# Patient Record
Sex: FEMALE | Race: WHITE | NOT HISPANIC OR LATINO | Employment: FULL TIME | URBAN - METROPOLITAN AREA
[De-identification: names, ages, dates, MRNs, and addresses within clinical notes are randomized per-mention and may not be internally consistent; named-entity substitution may affect disease eponyms.]

---

## 2017-01-23 DIAGNOSIS — R73.9 HYPERGLYCEMIA: Primary | ICD-10-CM

## 2017-01-28 ENCOUNTER — RESULTS ENCOUNTER (OUTPATIENT)
Dept: FAMILY MEDICINE CLINIC | Facility: CLINIC | Age: 47
End: 2017-01-28

## 2017-01-28 DIAGNOSIS — R73.9 HYPERGLYCEMIA: ICD-10-CM

## 2017-01-29 LAB
BUN SERPL-MCNC: 17 MG/DL (ref 6–24)
BUN/CREAT SERPL: 22 (ref 9–23)
CALCIUM SERPL-MCNC: 9.7 MG/DL (ref 8.7–10.2)
CHLORIDE SERPL-SCNC: 103 MMOL/L (ref 96–106)
CO2 SERPL-SCNC: 21 MMOL/L (ref 18–29)
CREAT SERPL-MCNC: 0.76 MG/DL (ref 0.57–1)
GLUCOSE SERPL-MCNC: 114 MG/DL (ref 65–99)
POTASSIUM SERPL-SCNC: 4.7 MMOL/L (ref 3.5–5.2)
SODIUM SERPL-SCNC: 143 MMOL/L (ref 134–144)

## 2017-02-06 ENCOUNTER — OFFICE VISIT (OUTPATIENT)
Dept: FAMILY MEDICINE CLINIC | Facility: CLINIC | Age: 47
End: 2017-02-06

## 2017-02-06 VITALS
SYSTOLIC BLOOD PRESSURE: 112 MMHG | HEART RATE: 74 BPM | HEIGHT: 63 IN | TEMPERATURE: 98.3 F | DIASTOLIC BLOOD PRESSURE: 78 MMHG | OXYGEN SATURATION: 98 % | BODY MASS INDEX: 28.08 KG/M2 | WEIGHT: 158.5 LBS

## 2017-02-06 DIAGNOSIS — J30.9 ATOPIC RHINITIS: ICD-10-CM

## 2017-02-06 DIAGNOSIS — E03.9 HYPOTHYROIDISM, UNSPECIFIED TYPE: ICD-10-CM

## 2017-02-06 DIAGNOSIS — F41.8 MIXED ANXIETY DEPRESSIVE DISORDER: Primary | ICD-10-CM

## 2017-02-06 DIAGNOSIS — R73.9 HYPERGLYCEMIA: ICD-10-CM

## 2017-02-06 PROCEDURE — 99214 OFFICE O/P EST MOD 30 MIN: CPT | Performed by: FAMILY MEDICINE

## 2017-02-06 RX ORDER — AZELASTINE HCL 205.5 UG/1
1 SPRAY NASAL 2 TIMES DAILY
Qty: 1 EACH | Refills: 5 | Status: SHIPPED | OUTPATIENT
Start: 2017-02-06 | End: 2017-10-17 | Stop reason: SDUPTHER

## 2017-02-06 RX ORDER — FLUTICASONE PROPIONATE 50 MCG
2 SPRAY, SUSPENSION (ML) NASAL DAILY
Qty: 1 EACH | Refills: 5 | Status: SHIPPED | OUTPATIENT
Start: 2017-02-06 | End: 2017-10-17 | Stop reason: SDUPTHER

## 2017-02-06 NOTE — PROGRESS NOTES
Subjective   Anu Adam is a 47 y.o. female.   Chief Complaint   Patient presents with   • Depression   • Sinus Problem   • Allergic Rhinitis       History of Present Illness     #1 depression with anxiety-patient is on Zoloft 50 mg a day. She takes it everyday. She reports no side effects. No suicidal ideations, no aggressive behaviors. Her mood is normal. She is not anxious.  PHQ 9 at 3, GAY 7 and 0.    #2 hyperglycemia-fasting blood sugar at 114.  Patient gained 6 pounds from last office visit.  She is starting a better diet today.  She is joining a weight loss program and she will have her foot prepared for her.  She also is planning to start regular exercise.    #3 allergic rhinitis-on Flonase and Astepro.  Patient has no side effects.  No dry nose, no nosebleeds.  She also takes Zyrtec as needed.  Over last week she has clear nasal drainage and congestion.  No other symptoms associated.  No fever, no chills, no sore throat.  She used Mucinex DM and it helped.  Overall she is getting better.    The following portions of the patient's history were reviewed and updated as appropriate: allergies, current medications, past family history, past medical history, past social history, past surgical history and problem list.    Review of Systems   Constitutional: Negative for chills and fatigue.   HENT: Positive for congestion, postnasal drip and rhinorrhea. Negative for sore throat.    Respiratory: Negative.  Negative for cough.    Cardiovascular: Negative.    Psychiatric/Behavioral: Negative for suicidal ideas. The patient is not nervous/anxious.          Objective   Wt Readings from Last 3 Encounters:   02/06/17 158 lb 8 oz (71.9 kg)   07/11/16 152 lb (68.9 kg)   02/03/16 153 lb (69.4 kg)      Vitals:    02/06/17 0800   BP: 112/78   Pulse: 74   Temp: 98.3 °F (36.8 °C)   SpO2: 98%     Temp Readings from Last 3 Encounters:   02/06/17 98.3 °F (36.8 °C)   07/11/16 98.4 °F (36.9 °C)   02/03/16 98 °F (36.7 °C)     BP  Readings from Last 3 Encounters:   02/06/17 112/78   07/11/16 100/60   02/03/16 112/70     Pulse Readings from Last 3 Encounters:   02/06/17 74   07/11/16 68   02/03/16 79       Physical Exam   Constitutional: She is oriented to person, place, and time. She appears well-developed and well-nourished.   HENT:   Head: Normocephalic and atraumatic.   Right Ear: Tympanic membrane, external ear and ear canal normal.   Left Ear: Tympanic membrane, external ear and ear canal normal.   Nose: Nose normal.   Mouth/Throat: Oropharynx is clear and moist and mucous membranes are normal.   Neck: Neck supple. Carotid bruit is not present. No thyromegaly present.   Cardiovascular: Normal rate, regular rhythm and normal heart sounds.    Pulmonary/Chest: Effort normal and breath sounds normal.   Neurological: She is alert and oriented to person, place, and time.   Skin: Skin is warm, dry and intact.   Psychiatric: She has a normal mood and affect. Her behavior is normal.       Assessment/Plan   Anu was seen today for depression, sinus problem, allergic rhinitis and hyperglycemia.    Diagnoses and all orders for this visit:    Mixed anxiety depressive disorder  -     CBC (No Diff); Future  -     Comprehensive Metabolic Panel; Future  -     Lipid Panel With LDL / HDL Ratio; Future  -     Vitamin D 25 Hydroxy; Future  -     Thyroid Cascade Profile; Future  -     Hemoglobin A1c; Future    Hyperglycemia  -     CBC (No Diff); Future  -     Comprehensive Metabolic Panel; Future  -     Lipid Panel With LDL / HDL Ratio; Future  -     Vitamin D 25 Hydroxy; Future  -     Thyroid Cascade Profile; Future  -     Hemoglobin A1c; Future    Atopic rhinitis  -     CBC (No Diff); Future  -     Comprehensive Metabolic Panel; Future  -     Lipid Panel With LDL / HDL Ratio; Future  -     Vitamin D 25 Hydroxy; Future  -     Thyroid Cascade Profile; Future  -     Hemoglobin A1c; Future    Hypothyroidism, unspecified type  -     CBC (No Diff); Future  -      Comprehensive Metabolic Panel; Future  -     Lipid Panel With LDL / HDL Ratio; Future  -     Vitamin D 25 Hydroxy; Future  -     Thyroid Cascade Profile; Future  -     Hemoglobin A1c; Future    Other orders  -     sertraline (ZOLOFT) 50 MG tablet; Take 1 tablet by mouth Daily.  -     fluticasone (FLONASE) 50 MCG/ACT nasal spray; 2 sprays into each nostril Daily.  -     azelastine (ASTEPRO) 0.15 % solution nasal spray; 1 spray into each nostril 2 (Two) Times a Day.        #1 depression with anxiety-controlled.  Continue current treatment.  Follow-up in 6 months.    #2 allergic rhinitis-continue current treatment.  Follow-up in 6 months.    #3 hyperglycemia-increased risk for developing diabetes.  Weight loss can decrease the risk.  Patient will work on improving her diet and she will start regular exercise.  We'll continue to monitor.

## 2017-04-14 ENCOUNTER — OFFICE VISIT (OUTPATIENT)
Dept: OBSTETRICS AND GYNECOLOGY | Age: 47
End: 2017-04-14

## 2017-04-14 VITALS
HEIGHT: 63 IN | SYSTOLIC BLOOD PRESSURE: 120 MMHG | BODY MASS INDEX: 26.05 KG/M2 | DIASTOLIC BLOOD PRESSURE: 66 MMHG | WEIGHT: 147 LBS

## 2017-04-14 DIAGNOSIS — Z01.419 ENCOUNTER FOR GYNECOLOGICAL EXAMINATION WITHOUT ABNORMAL FINDING: Primary | ICD-10-CM

## 2017-04-14 PROCEDURE — 99386 PREV VISIT NEW AGE 40-64: CPT | Performed by: OBSTETRICS & GYNECOLOGY

## 2017-04-14 NOTE — PROGRESS NOTES
Routine Annual Visit    2017    Patient: Anu Adam          MR#:[id number]      Chief Complaint   Patient presents with   • Annual Exam     PT HERE TO [facility], NEEDS ANNUAL EXAM. HAD MAMMOGRAM TODAY. HAS HX OF SOME ABNORMAL PAPS BUT WAS SO LONG AGO DOESNT REALLY REMEMBER IF SHE HAD A COLPO OR NOT. HAS ALWAYS CAME BACK AS NORMAL. HAD PAP LAST YEAR THAT WAS NORMAL WITH [facility].        History of Present Illness    47 y.o. female  who presents for annual exam.   New pt to me  mammo today  Pap last year normal, will call for results  Stopped minivelle because patch was annoying  Only taking progesterone  Some HF and NS but not bad  4 years menopausal  Mother  age 84 (had colon cancer when young but not cause of death)  CSC 3 years ago [facility]          No LMP recorded. Patient is postmenopausal.  Obstetric History:  OB History      Para Term  AB TAB SAB Ectopic Multiple Living    1 1 1       1         Menstrual History:     No LMP recorded. Patient is postmenopausal.       Sexual History:       ________________________________________  Patient Active Problem List   Diagnosis   • Atopic rhinitis   • Mixed anxiety depressive disorder   • Hypercholesterolemia   • Hyperglycemia   • Hypothyroidism       Past Medical History:   Diagnosis Date   • Depression    • Hormone replacement therapy    • Hypothyroidism        Past Surgical History:   Procedure Laterality Date   •  SECTION     • CHOLECYSTECTOMY     • TONSILLECTOMY         History   Smoking Status   • Never Smoker   Smokeless Tobacco   • Not on file       has a current medication list which includes the following prescription(s): levothyroxine, progesterone, sertraline, azelastine, cetirizine hcl, estradiol, and fluticasone.  ________________________________________    Current contraception: post menopausal status  History of abnormal Pap smear: yes - remote  Family history of Breast cancer: no  Family history of uterine  "or ovarian cancer: no  Family History of colon cancer/colon polyps: yes - mother colon when young  History of abnormal mammogram: no      The following portions of the patient's history were reviewed and updated as appropriate: allergies, current medications, past family history, past medical history, past social history, past surgical history and problem list.    Review of Systems    Pertinent items are noted in HPI.     Objective   Physical Exam    /66  Ht 63\" (160 cm)  Wt 147 lb (66.7 kg)  BMI 26.04 kg/m2   BP Readings from Last 3 Encounters:   04/14/17 120/66   02/06/17 112/78   07/11/16 100/60      Wt Readings from Last 3 Encounters:   04/14/17 147 lb (66.7 kg)   02/06/17 158 lb 8 oz (71.9 kg)   07/11/16 152 lb (68.9 kg)      BMI: Estimated body mass index is 26.04 kg/(m^2) as calculated from the following:    Height as of this encounter: 63\" (160 cm).    Weight as of this encounter: 147 lb (66.7 kg).      General:   alert, appears stated age and cooperative   Abdomen: soft, non-tender, without masses or organomegaly   Breast: inspection negative, no nipple discharge or bleeding, no masses or nodularity palpable   Vulva: normal   Vagina: normal mucosa   Cervix: no cervical motion tenderness and no lesions   Uterus: normal size, mobile or non-tender   Adnexa: normal adnexa and no mass, fullness, tenderness     Assessment:    1. Normal annual exam   Assessment     ICD-10-CM ICD-9-CM   1. Encounter for gynecological examination without abnormal finding Z01.419 V72.31     Plan:    Plan     [x]  Mammogram request made  []  PAP done  []  Labs:   []  GC/Chl/TV  []  DEXA scan   []  Referral for colonoscopy:       Anu was seen today for annual exam.    Diagnoses and all orders for this visit:    Encounter for gynecological examination without abnormal finding    [facility] pap [facility]  rec stop progesterone and see how she feels- if wants to restart can do so or consider estragel to add  "

## 2017-04-24 ENCOUNTER — TELEPHONE (OUTPATIENT)
Dept: OBSTETRICS AND GYNECOLOGY | Age: 47
End: 2017-04-24

## 2017-04-24 RX ORDER — ESTRADIOL AND NORETHINDRONE ACETATE 1; .5 MG/1; MG/1
1 TABLET ORAL DAILY
Qty: 30 TABLET | Refills: 1 | Status: SHIPPED | OUTPATIENT
Start: 2017-04-24 | End: 2017-10-17 | Stop reason: SDUPTHER

## 2017-04-24 RX ORDER — ESTRADIOL AND NORETHINDRONE ACETATE 1; .5 MG/1; MG/1
1 TABLET ORAL DAILY
Qty: 90 TABLET | Refills: 3 | Status: SHIPPED | OUTPATIENT
Start: 2017-04-24 | End: 2018-04-16 | Stop reason: SDUPTHER

## 2017-04-24 NOTE — TELEPHONE ENCOUNTER
Pt states stopped taking progesterone 100 mg and estradiol patch 0.05mg after last appt. Pt states is now having hot flashes, night sweats, and irritable moods. Wanting to try something to help w/ this. Use [facility] in chart, if pt likes meds will need to send to [facility].    Pt # [phone]

## 2017-04-24 NOTE — TELEPHONE ENCOUNTER
SPOKE TO PT, SHE DOES NOT WANT TO GO BACK ON PROGESTERONE OR MINIVELLE PATCH (DOESNT LIKE THE PATCH, NEVER STICKS). WOULD LIKE SOMETHING DIFFERENT. JUST LET ME KNOW WHAT RX AND ILL SEND IN. THANKS

## 2017-08-01 ENCOUNTER — OFFICE VISIT (OUTPATIENT)
Dept: FAMILY MEDICINE CLINIC | Facility: CLINIC | Age: 47
End: 2017-08-01

## 2017-08-01 VITALS
TEMPERATURE: 98 F | BODY MASS INDEX: 27.46 KG/M2 | HEART RATE: 71 BPM | WEIGHT: 155 LBS | OXYGEN SATURATION: 99 % | SYSTOLIC BLOOD PRESSURE: 120 MMHG | HEIGHT: 63 IN | DIASTOLIC BLOOD PRESSURE: 60 MMHG

## 2017-08-01 DIAGNOSIS — H93.8X3 CONGESTION OF BOTH EARS: Primary | ICD-10-CM

## 2017-08-01 DIAGNOSIS — T63.461A WASP STING, ACCIDENTAL OR UNINTENTIONAL, INITIAL ENCOUNTER: ICD-10-CM

## 2017-08-01 PROCEDURE — 99213 OFFICE O/P EST LOW 20 MIN: CPT | Performed by: FAMILY MEDICINE

## 2017-08-01 NOTE — PROGRESS NOTES
Subjective   Anu Adam is a 47 y.o. female.   Chief Complaint   Patient presents with   • ear pressure   • Insect Bite       History of Present Illness     #1 Ears congestion-bilateral, but left more than right.  Started a few days ago.  It is associated with pain which is achy, on and off initially and then constant.  There is postnasal drainage, but no runny nose, no fever, no chills.  Patient has mild cough which is dry.  She uses Zyrtec and nasal sprays.  She started using Mucinex D and it didn't help much.  No other symptoms associated.    #2 Wasp bite-3 days ago.  Localized on left upper inner thigh. She has redness and swelling, today it started itching.  She didn't have any problems with breathing    The following portions of the patient's history were reviewed and updated as appropriate: allergies, current medications, past medical history, past social history and problem list.    Review of Systems   Constitutional: Negative for chills and fever.   HENT: Positive for congestion, ear pain and postnasal drip. Negative for ear discharge and rhinorrhea.    Respiratory: Positive for cough. Negative for wheezing.    Cardiovascular: Negative.          Objective   Wt Readings from Last 3 Encounters:   08/01/17 155 lb (70.3 kg)   04/14/17 147 lb (66.7 kg)   02/06/17 158 lb 8 oz (71.9 kg)      Vitals:    08/01/17 1527   BP: 120/60   Pulse: 71   Temp: 98 °F (36.7 °C)   SpO2: 99%     Temp Readings from Last 3 Encounters:   08/01/17 98 °F (36.7 °C)   02/06/17 98.3 °F (36.8 °C)   07/11/16 98.4 °F (36.9 °C)     BP Readings from Last 3 Encounters:   08/01/17 120/60   04/14/17 120/66   02/06/17 112/78     Pulse Readings from Last 3 Encounters:   08/01/17 71   02/06/17 74   07/11/16 68       Physical Exam   Constitutional: She is oriented to person, place, and time. She appears well-developed and well-nourished.   HENT:   Head: Normocephalic and atraumatic.   Right Ear: External ear and ear canal normal. Tympanic membrane  is not injected, not perforated, not erythematous, not retracted and not bulging. A middle ear effusion is present.   Left Ear: External ear and ear canal normal. Tympanic membrane is not injected, not perforated, not erythematous, not retracted and not bulging. A middle ear effusion is present.   Nose: Mucosal edema present. No rhinorrhea.   Mouth/Throat: Oropharynx is clear and moist and mucous membranes are normal.   Neck: Neck supple.   Cardiovascular: Normal rate, regular rhythm and normal heart sounds.    Pulmonary/Chest: Effort normal and breath sounds normal.   Lymphadenopathy:     She has no cervical adenopathy.   Neurological: She is alert and oriented to person, place, and time.   Skin: Skin is warm, dry and intact.   Left, internal, upper thigh -redness and swelling ~5cm. No drainage.   Psychiatric: She has a normal mood and affect. Her behavior is normal.       Assessment/Plan   Anu was seen today for ear pressure and insect bite.    Diagnoses and all orders for this visit:    Congestion of both ears    Wasp sting, accidental or unintentional, initial encounter        #1 lateral view congestion-clear effusion.  No signs of infection.  Patient will use decongestants.  She will continue allergy medications.  Return to clinic if symptoms are not better in a few days, or sooner if worsening.      #2 wasp bite-healing.  Hydrocortisone cream for itching.

## 2017-08-06 ENCOUNTER — RESULTS ENCOUNTER (OUTPATIENT)
Dept: FAMILY MEDICINE CLINIC | Facility: CLINIC | Age: 47
End: 2017-08-06

## 2017-08-06 DIAGNOSIS — E03.9 HYPOTHYROIDISM, UNSPECIFIED TYPE: ICD-10-CM

## 2017-08-06 DIAGNOSIS — F41.8 MIXED ANXIETY DEPRESSIVE DISORDER: ICD-10-CM

## 2017-08-06 DIAGNOSIS — J30.9 ATOPIC RHINITIS: ICD-10-CM

## 2017-08-06 DIAGNOSIS — R73.9 HYPERGLYCEMIA: ICD-10-CM

## 2017-08-14 LAB
25(OH)D3+25(OH)D2 SERPL-MCNC: 32.9 NG/ML (ref 30–100)
ALBUMIN SERPL-MCNC: 4.4 G/DL (ref 3.5–5.5)
ALBUMIN/GLOB SERPL: 2.1 {RATIO} (ref 1.2–2.2)
ALP SERPL-CCNC: 86 IU/L (ref 39–117)
ALT SERPL-CCNC: 12 IU/L (ref 0–32)
AST SERPL-CCNC: 16 IU/L (ref 0–40)
BILIRUB SERPL-MCNC: 0.7 MG/DL (ref 0–1.2)
BUN SERPL-MCNC: 17 MG/DL (ref 6–24)
BUN/CREAT SERPL: 21 (ref 9–23)
CALCIUM SERPL-MCNC: 9.3 MG/DL (ref 8.7–10.2)
CHLORIDE SERPL-SCNC: 102 MMOL/L (ref 96–106)
CHOLEST SERPL-MCNC: 216 MG/DL (ref 100–199)
CO2 SERPL-SCNC: 24 MMOL/L (ref 18–29)
CREAT SERPL-MCNC: 0.8 MG/DL (ref 0.57–1)
ERYTHROCYTE [DISTWIDTH] IN BLOOD BY AUTOMATED COUNT: 12.8 % (ref 12.3–15.4)
GLOBULIN SER CALC-MCNC: 2.1 G/DL (ref 1.5–4.5)
GLUCOSE SERPL-MCNC: 102 MG/DL (ref 65–99)
HBA1C MFR BLD: 5.6 % (ref 4.8–5.6)
HCT VFR BLD AUTO: 42.5 % (ref 34–46.6)
HDLC SERPL-MCNC: 38 MG/DL
HGB BLD-MCNC: 14.2 G/DL (ref 11.1–15.9)
INTERPRETATION: ABNORMAL
LDLC SERPL CALC-MCNC: 145 MG/DL (ref 0–99)
LDLC/HDLC SERPL: 3.8 RATIO UNITS (ref 0–3.2)
MCH RBC QN AUTO: 30.9 PG (ref 26.6–33)
MCHC RBC AUTO-ENTMCNC: 33.4 G/DL (ref 31.5–35.7)
MCV RBC AUTO: 93 FL (ref 79–97)
PLATELET # BLD AUTO: 317 X10E3/UL (ref 150–379)
POTASSIUM SERPL-SCNC: 4.6 MMOL/L (ref 3.5–5.2)
PROT SERPL-MCNC: 6.5 G/DL (ref 6–8.5)
RBC # BLD AUTO: 4.59 X10E6/UL (ref 3.77–5.28)
SODIUM SERPL-SCNC: 141 MMOL/L (ref 134–144)
T4 FREE SERPL-MCNC: 1.36 NG/DL (ref 0.82–1.77)
THYROPEROXIDASE AB SERPL-ACNC: 222 IU/ML (ref 0–34)
TRIGL SERPL-MCNC: 163 MG/DL (ref 0–149)
TSH SERPL DL<=0.005 MIU/L-ACNC: 6.98 UIU/ML (ref 0.45–4.5)
VLDLC SERPL CALC-MCNC: 33 MG/DL (ref 5–40)
WBC # BLD AUTO: 6.7 X10E3/UL (ref 3.4–10.8)

## 2017-09-20 RX ORDER — LEVOTHYROXINE SODIUM 112 UG/1
TABLET ORAL
Qty: 30 TABLET | Refills: 0 | Status: SHIPPED | OUTPATIENT
Start: 2017-09-20 | End: 2017-10-17 | Stop reason: SDUPTHER

## 2017-10-17 ENCOUNTER — OFFICE VISIT (OUTPATIENT)
Dept: INTERNAL MEDICINE | Facility: CLINIC | Age: 47
End: 2017-10-17

## 2017-10-17 VITALS
SYSTOLIC BLOOD PRESSURE: 120 MMHG | BODY MASS INDEX: 27.46 KG/M2 | WEIGHT: 155 LBS | DIASTOLIC BLOOD PRESSURE: 80 MMHG | HEIGHT: 63 IN | OXYGEN SATURATION: 99 % | HEART RATE: 87 BPM | TEMPERATURE: 98.2 F

## 2017-10-17 DIAGNOSIS — F41.8 MIXED ANXIETY DEPRESSIVE DISORDER: Primary | ICD-10-CM

## 2017-10-17 DIAGNOSIS — J30.89 CHRONIC NON-SEASONAL ALLERGIC RHINITIS, UNSPECIFIED TRIGGER: ICD-10-CM

## 2017-10-17 DIAGNOSIS — R73.9 HYPERGLYCEMIA: ICD-10-CM

## 2017-10-17 DIAGNOSIS — E03.9 HYPOTHYROIDISM, UNSPECIFIED TYPE: ICD-10-CM

## 2017-10-17 PROCEDURE — 99214 OFFICE O/P EST MOD 30 MIN: CPT | Performed by: FAMILY MEDICINE

## 2017-10-17 RX ORDER — LEVOTHYROXINE SODIUM 112 UG/1
TABLET ORAL
Qty: 92 TABLET | Refills: 3 | Status: SHIPPED | OUTPATIENT
Start: 2017-10-17 | End: 2018-01-19 | Stop reason: DRUGHIGH

## 2017-10-17 RX ORDER — AZELASTINE HCL 205.5 UG/1
1 SPRAY NASAL 2 TIMES DAILY
Qty: 1 EACH | Refills: 5 | Status: SHIPPED | OUTPATIENT
Start: 2017-10-17 | End: 2019-02-25 | Stop reason: SDUPTHER

## 2017-10-17 RX ORDER — FLUTICASONE PROPIONATE 50 MCG
2 SPRAY, SUSPENSION (ML) NASAL DAILY
Qty: 1 BOTTLE | Refills: 5 | Status: SHIPPED | OUTPATIENT
Start: 2017-10-17 | End: 2019-02-25 | Stop reason: SDUPTHER

## 2017-10-17 NOTE — PROGRESS NOTES
Subjective   Anu Adam is a 47 y.o. female.   Chief Complaint   Patient presents with   • Hyperglycemia   • Allergic Rhinitis   • Depression   • Hypothyroidism       History of Present Illness     #1 depression with anxiety-patient is on Zoloft 50 mg a day. She takes it everyday. She reports no side effects. No suicidal ideations, no aggressive behaviors. Her mood is normal. She is not anxious, she worries from time to time.  Her son is 17.   PHQ 9 at 3, GAY 7 and 2.     #2 hyperglycemia-fasting blood sugar at 102.  A1c at 5.6.   Patient gained 8 pounds from last office visit.  She joined [employer] 5 weeks ago.  She loses weight as long as she stick to their plan.  She is trying to get better with stopping/decreasing amount of soda.  She walks 3-4 times a week for 30 minutes.     #3 allergic rhinitis-on Flonase and Astepro.  Patient has no side effects.  No dry nose, no nosebleeds.  She also uses Zyrtec as needed.  Symptoms are controlled.    #4 hypothyroidism- patient is on levothyroxine 112 µg a day. She takes it on empty stomach and does not eat for at least an hour after taking the medication. TSH 6.98. Free T4- 1.36.    The following portions of the patient's history were reviewed and updated as appropriate: allergies, current medications, past family history, past medical history, past social history, past surgical history and problem list.    Review of Systems   Constitutional: Negative.    HENT: Negative.    Respiratory: Negative.    Cardiovascular: Negative.    Psychiatric/Behavioral: Negative.          Objective   Wt Readings from Last 3 Encounters:   10/17/17 155 lb (70.3 kg)   08/01/17 155 lb (70.3 kg)   04/14/17 147 lb (66.7 kg)      Vitals:    10/17/17 0747   BP: 120/80   Pulse: 87   Temp: 98.2 °F (36.8 °C)   SpO2: 99%     Temp Readings from Last 3 Encounters:   10/17/17 98.2 °F (36.8 °C)   08/01/17 98 °F (36.7 °C)   02/06/17 98.3 °F (36.8 °C)     BP Readings from Last 3 Encounters:   10/17/17  120/80   08/01/17 120/60   04/14/17 120/66     Pulse Readings from Last 3 Encounters:   10/17/17 87   08/01/17 71   02/06/17 74       Physical Exam   Constitutional: She is oriented to person, place, and time. She appears well-developed and well-nourished.   HENT:   Head: Normocephalic and atraumatic.   Neck: Neck supple. Carotid bruit is not present. No thyromegaly present.   Cardiovascular: Normal rate, regular rhythm and normal heart sounds.    Pulmonary/Chest: Effort normal and breath sounds normal.   Neurological: She is alert and oriented to person, place, and time.   Skin: Skin is warm, dry and intact.   Psychiatric: She has a normal mood and affect. Her behavior is normal.       Assessment/Plan   Anu was seen today for hyperglycemia, allergic rhinitis, depression and hypothyroidism.    Diagnoses and all orders for this visit:    Mixed anxiety depressive disorder    Hyperglycemia    Hypothyroidism, unspecified type  -     Thyroid Cascade Profile; Future    Chronic non-seasonal allergic rhinitis, unspecified trigger    Other orders  -     sertraline (ZOLOFT) 50 MG tablet; Take 1 tablet by mouth Daily.  -     fluticasone (FLONASE) 50 MCG/ACT nasal spray; 2 sprays into each nostril Daily.  -     azelastine (ASTEPRO) 0.15 % solution nasal spray; 1 spray into each nostril 2 (Two) Times a Day.  -     levothyroxine (SYNTHROID, LEVOTHROID) 112 MCG tablet; 1 tb Mon-Sat, 1.5tb on Sun.        #1 depression with anxiety-controlled.  Continue current treatment.  Follow-up in 6 months.      #2 impaired fasting glucose-continue to work on weight loss.  Follow-up in 6 months.    #3 hypothyroidism-uncontrolled.  Increase levothyroxine dose.  Patient will take 1 tablet 6 days a week, 1.5 tablet on Sunday.  Recheck in 3 months.  Follow-up in 12 months.     #4 allergic rhinitis-controlled.  Continue same follow-up in 6-12 months.

## 2017-11-29 RX ORDER — LEVOTHYROXINE SODIUM 112 UG/1
TABLET ORAL
Qty: 30 TABLET | Refills: 0 | Status: SHIPPED | OUTPATIENT
Start: 2017-11-29 | End: 2018-01-19 | Stop reason: DRUGHIGH

## 2018-01-02 DIAGNOSIS — E03.9 HYPOTHYROIDISM, UNSPECIFIED TYPE: ICD-10-CM

## 2018-01-02 RX ORDER — LEVOTHYROXINE SODIUM 112 UG/1
TABLET ORAL
Qty: 30 TABLET | Refills: 0 | Status: SHIPPED | OUTPATIENT
Start: 2018-01-02 | End: 2018-01-19 | Stop reason: DRUGHIGH

## 2018-01-15 LAB
INTERPRETATION: ABNORMAL
T4 FREE SERPL-MCNC: 1.55 NG/DL (ref 0.82–1.77)
THYROPEROXIDASE AB SERPL-ACNC: 231 IU/ML (ref 0–34)
TSH SERPL DL<=0.005 MIU/L-ACNC: 5.49 UIU/ML (ref 0.45–4.5)

## 2018-01-17 DIAGNOSIS — E03.9 ACQUIRED HYPOTHYROIDISM: Primary | ICD-10-CM

## 2018-01-19 RX ORDER — LEVOTHYROXINE SODIUM 112 UG/1
TABLET ORAL
Qty: 112 TABLET | Refills: 0 | Status: SHIPPED | OUTPATIENT
Start: 2018-01-19 | End: 2018-05-09 | Stop reason: SDUPTHER

## 2018-01-19 RX ORDER — LEVOTHYROXINE SODIUM 112 UG/1
TABLET ORAL
Qty: 30 TABLET | Refills: 0 | Status: SHIPPED | OUTPATIENT
Start: 2018-01-19 | End: 2018-02-02 | Stop reason: SDUPTHER

## 2018-02-02 ENCOUNTER — OFFICE VISIT (OUTPATIENT)
Dept: RETAIL CLINIC | Facility: CLINIC | Age: 48
End: 2018-02-02

## 2018-02-02 VITALS
TEMPERATURE: 98.2 F | DIASTOLIC BLOOD PRESSURE: 87 MMHG | RESPIRATION RATE: 18 BRPM | SYSTOLIC BLOOD PRESSURE: 117 MMHG | HEART RATE: 99 BPM | OXYGEN SATURATION: 97 %

## 2018-02-02 DIAGNOSIS — J01.00 ACUTE NON-RECURRENT MAXILLARY SINUSITIS: Primary | ICD-10-CM

## 2018-02-02 PROCEDURE — 99213 OFFICE O/P EST LOW 20 MIN: CPT | Performed by: NURSE PRACTITIONER

## 2018-02-02 RX ORDER — AMOXICILLIN 875 MG/1
875 TABLET, COATED ORAL 2 TIMES DAILY
Qty: 20 TABLET | Refills: 0 | Status: SHIPPED | OUTPATIENT
Start: 2018-02-02 | End: 2018-02-12

## 2018-02-02 NOTE — PROGRESS NOTES
Subjective   Anu Adam is a 48 y.o. female.     HPI Comments: Patient received influenza vaccination this season.     Sinusitis   This is a new problem. Episode onset: 10 days ago. The problem has been gradually worsening since onset. There has been no fever. Associated symptoms include congestion, ear pain (pressure), headaches and sinus pressure. Pertinent negatives include no chills, coughing, diaphoresis, hoarse voice, neck pain, shortness of breath, sneezing, sore throat or swollen glands. Treatments tried: mucinex-d. The treatment provided mild relief.       The following portions of the patient's history were reviewed and updated as appropriate: allergies, current medications, past family history, past medical history, past social history, past surgical history and problem list.    Review of Systems   Constitutional: Negative for appetite change, chills, diaphoresis, fatigue and fever.   HENT: Positive for congestion, ear pain (pressure), postnasal drip, sinus pain and sinus pressure. Negative for ear discharge, facial swelling, hearing loss, hoarse voice, mouth sores, nosebleeds, rhinorrhea, sneezing, sore throat, trouble swallowing and voice change.    Eyes: Negative for pain, discharge, redness and itching.   Respiratory: Negative for cough, chest tightness, shortness of breath, wheezing and stridor.    Cardiovascular: Negative for chest pain and palpitations.   Gastrointestinal: Negative for abdominal pain, constipation, diarrhea, nausea and vomiting.   Genitourinary: Negative for decreased urine volume.   Musculoskeletal: Positive for myalgias. Negative for neck pain and neck stiffness.   Skin: Negative for color change.   Allergic/Immunologic: Positive for environmental allergies. Negative for immunocompromised state.   Neurological: Positive for headaches. Negative for dizziness, vertigo, syncope and weakness.       Objective   Physical Exam   Constitutional: She is oriented to person, place, and  time. She appears well-developed and well-nourished. She is cooperative.  Non-toxic appearance. She does not appear ill. No distress.   HENT:   Right Ear: Hearing, external ear and ear canal normal. Tympanic membrane is not perforated, not erythematous, not retracted and not bulging. A middle ear effusion is present.   Left Ear: Hearing, tympanic membrane, external ear and ear canal normal.   Nose: Mucosal edema and septal deviation present. No rhinorrhea. Right sinus exhibits maxillary sinus tenderness and frontal sinus tenderness. Left sinus exhibits maxillary sinus tenderness and frontal sinus tenderness.   Mouth/Throat: Uvula is midline and mucous membranes are normal. No oral lesions. No uvula swelling. Posterior oropharyngeal erythema present. No oropharyngeal exudate or posterior oropharyngeal edema. Tonsils are 0 on the right. Tonsils are 0 on the left.   Eyes: Conjunctivae and lids are normal.   Cardiovascular: Normal rate, regular rhythm, S1 normal and S2 normal.    Pulmonary/Chest: Effort normal and breath sounds normal.   Abdominal: Bowel sounds are normal. There is no tenderness.   Lymphadenopathy:     She has no cervical adenopathy.   Neurological: She is alert and oriented to person, place, and time.   Skin: Skin is warm and dry. She is not diaphoretic. No pallor.   Vitals reviewed.      Assessment/Plan   Anu was seen today for sinusitis.    Diagnoses and all orders for this visit:    Acute non-recurrent maxillary sinusitis  -     amoxicillin (AMOXIL) 875 MG tablet; Take 1 tablet by mouth 2 (Two) Times a Day for 10 days.          -     May continue to use mucinex-d OTC for congestion-increase H2O intake        -     Follow up with PCP or urgent treatment for persistent or worsening symptoms

## 2018-02-02 NOTE — PATIENT INSTRUCTIONS

## 2018-04-16 ENCOUNTER — OFFICE VISIT (OUTPATIENT)
Dept: OBSTETRICS AND GYNECOLOGY | Age: 48
End: 2018-04-16

## 2018-04-16 VITALS
DIASTOLIC BLOOD PRESSURE: 88 MMHG | SYSTOLIC BLOOD PRESSURE: 140 MMHG | BODY MASS INDEX: 27.11 KG/M2 | WEIGHT: 153 LBS | HEIGHT: 63 IN

## 2018-04-16 DIAGNOSIS — Z01.419 ENCOUNTER FOR GYNECOLOGICAL EXAMINATION WITHOUT ABNORMAL FINDING: Primary | ICD-10-CM

## 2018-04-16 DIAGNOSIS — R53.83 FATIGUE, UNSPECIFIED TYPE: ICD-10-CM

## 2018-04-16 PROCEDURE — 99396 PREV VISIT EST AGE 40-64: CPT | Performed by: OBSTETRICS & GYNECOLOGY

## 2018-04-16 RX ORDER — ESTRADIOL AND NORETHINDRONE ACETATE 1; .5 MG/1; MG/1
1 TABLET ORAL DAILY
Qty: 90 TABLET | Refills: 3 | Status: SHIPPED | OUTPATIENT
Start: 2018-04-16 | End: 2019-03-06 | Stop reason: SDUPTHER

## 2018-04-16 NOTE — PROGRESS NOTES
"Routine Annual Visit    2018    Patient: Anu Adam          MR#:[id number]      Chief Complaint   Patient presents with   • Annual Exam     PT HERE FOR ROUTINE AE AND MG. SHE IS ALWAYS \"TIRED\" JUST WANTS TO SLEEP. LAST PAP PER THE PT WAS 2016 (NEG) AT [facility].       History of Present Illness    48 y.o. female  who presents for annual exam.   Tired all the time, just recent, ?depressed- son is dawn at [employer]  Will go to [facility] where his dad works- flight school?  Needs thyroid cascade recheck   Vit D and CBC normal last year and no bleeding  CSC   5 years menopausal, on activella doing well  Non smoker  mammo today  UTD pap           Patient's last menstrual period was 2013.  Obstetric History:  OB History      Para Term  AB Living    1 1 1   1    SAB TAB Ectopic Molar Multiple Live Births         1         Menstrual History:     Patient's last menstrual period was 2013.       Sexual History:       ________________________________________  Patient Active Problem List   Diagnosis   • Mixed anxiety depressive disorder   • Hypercholesterolemia   • Hyperglycemia   • Hypothyroidism   • Chronic non-seasonal allergic rhinitis       Past Medical History:   Diagnosis Date   • Anxiety    • Depression    • Hormone replacement therapy    • Hypothyroidism        Past Surgical History:   Procedure Laterality Date   •  SECTION     • CHOLECYSTECTOMY     • TONSILLECTOMY     • TUBAL ABDOMINAL LIGATION         History   Smoking Status   • Never Smoker   Smokeless Tobacco   • Never Used       has a current medication list which includes the following prescription(s): azelastine, cetirizine hcl, estradiol-norethindrone, fluticasone, levothyroxine, and sertraline.  ________________________________________    Current contraception: none  History of abnormal Pap smear: no  Family history of Breast cancer: no  Family history of uterine or ovarian cancer: no  Family History " "of colon cancer/colon polyps: yes - mother, aunt and cousin, UTD [facility]  History of abnormal mammogram: no      The following portions of the patient's history were reviewed and updated as appropriate: allergies, current medications, past family history, past medical history, past social history, past surgical history and problem list.    Review of Systems    Pertinent items are noted in HPI.     Objective   Physical Exam    /88   Ht 160 cm (63\")   Wt 69.4 kg (153 lb)   LMP 11/16/2013   BMI 27.10 kg/m²    BP Readings from Last 3 Encounters:   04/16/18 140/88   02/02/18 117/87   10/17/17 120/80      Wt Readings from Last 3 Encounters:   04/16/18 69.4 kg (153 lb)   10/17/17 70.3 kg (155 lb)   08/01/17 70.3 kg (155 lb)      BMI: Estimated body mass index is 27.1 kg/m² as calculated from the following:    Height as of this encounter: 160 cm (63\").    Weight as of this encounter: 69.4 kg (153 lb).      General:   alert, appears stated age and cooperative   Abdomen: soft, non-tender, without masses or organomegaly   Breast: inspection negative, no nipple discharge or bleeding, no masses or nodularity palpable   Vulva: normal   Vagina: normal mucosa   Cervix: no cervical motion tenderness and no lesions   Uterus: normal size, mobile or non-tender   Adnexa: no mass, fullness, tenderness     Assessment:    1. Normal annual exam   Assessment     ICD-10-CM ICD-9-CM   1. Encounter for gynecological examination without abnormal finding Z01.419 V72.31   2. Fatigue, unspecified type R53.83 780.79     Plan:    Plan     [x]  Mammogram request made  []  PAP done  [x]  Labs: thyroid cascade  []  GC/Chl/TV  []  DEXA scan   []  Referral for colonoscopy:       Anu was seen today for annual exam.    Diagnoses and all orders for this visit:    Encounter for gynecological examination without abnormal finding  -     Thyroid Cascade Profile    Fatigue, unspecified type  -     Thyroid Cascade Profile    Other orders  -     " estradiol-norethindrone (ACTIVELLA) 1-0.5 MG per tablet; Take 1 tablet by mouth Daily.            Counseling:  --Nutrition: Stressed importance of moderation and caloric balance, stressed fresh fruit and vegetables  --Exercise: Stressed the importance of regular exercise. 3-5 times weekly   - Discussed screening mammogram recommendations.   --Discussed benefits of screening colonoscopy- age 50 unless FH  --Discussed pap smear screening recommendations

## 2018-04-17 ENCOUNTER — RESULTS ENCOUNTER (OUTPATIENT)
Dept: INTERNAL MEDICINE | Facility: CLINIC | Age: 48
End: 2018-04-17

## 2018-04-17 DIAGNOSIS — E03.9 ACQUIRED HYPOTHYROIDISM: ICD-10-CM

## 2018-04-17 LAB — TSH SERPL DL<=0.005 MIU/L-ACNC: 0.67 UIU/ML (ref 0.45–4.5)

## 2018-04-26 RX ORDER — LEVOTHYROXINE SODIUM 112 UG/1
TABLET ORAL
Qty: 112 TABLET | Refills: 0 | OUTPATIENT
Start: 2018-04-26

## 2018-05-03 RX ORDER — ESTRADIOL AND NORETHINDRONE ACETATE 1; .5 MG/1; MG/1
TABLET ORAL
Qty: 84 TABLET | Refills: 3 | Status: SHIPPED | OUTPATIENT
Start: 2018-05-03 | End: 2018-05-09 | Stop reason: SDUPTHER

## 2018-05-09 ENCOUNTER — OFFICE VISIT (OUTPATIENT)
Dept: INTERNAL MEDICINE | Facility: CLINIC | Age: 48
End: 2018-05-09

## 2018-05-09 VITALS
TEMPERATURE: 98.2 F | DIASTOLIC BLOOD PRESSURE: 88 MMHG | BODY MASS INDEX: 27.11 KG/M2 | WEIGHT: 153 LBS | OXYGEN SATURATION: 98 % | HEIGHT: 63 IN | HEART RATE: 98 BPM | SYSTOLIC BLOOD PRESSURE: 138 MMHG

## 2018-05-09 DIAGNOSIS — R73.9 HYPERGLYCEMIA: ICD-10-CM

## 2018-05-09 DIAGNOSIS — F41.8 MIXED ANXIETY DEPRESSIVE DISORDER: Primary | ICD-10-CM

## 2018-05-09 DIAGNOSIS — B02.9 HERPES ZOSTER WITHOUT COMPLICATION: ICD-10-CM

## 2018-05-09 DIAGNOSIS — E03.9 HYPOTHYROIDISM, UNSPECIFIED TYPE: ICD-10-CM

## 2018-05-09 PROCEDURE — 99214 OFFICE O/P EST MOD 30 MIN: CPT | Performed by: FAMILY MEDICINE

## 2018-05-09 RX ORDER — SERTRALINE HYDROCHLORIDE 100 MG/1
50 TABLET, FILM COATED ORAL DAILY
Qty: 90 TABLET | Refills: 1 | Status: SHIPPED | OUTPATIENT
Start: 2018-05-09 | End: 2018-07-06 | Stop reason: SDUPTHER

## 2018-05-09 RX ORDER — VALACYCLOVIR HYDROCHLORIDE 1 G/1
1000 TABLET, FILM COATED ORAL 3 TIMES DAILY
Qty: 21 TABLET | Refills: 0 | Status: SHIPPED | OUTPATIENT
Start: 2018-05-09 | End: 2019-02-25

## 2018-05-09 RX ORDER — LEVOTHYROXINE SODIUM 112 UG/1
TABLET ORAL
Qty: 112 TABLET | Refills: 3 | Status: SHIPPED | OUTPATIENT
Start: 2018-05-09 | End: 2019-02-25 | Stop reason: SDUPTHER

## 2018-05-09 NOTE — PROGRESS NOTES
Subjective   Anu Adam is a 48 y.o. female.   Chief Complaint   Patient presents with   • Rash   • Hypothyroidism   • Depression   • Hyperglycemia       History of Present Illness     #1 depression with anxiety-patient is on Zoloft 50 mg a day. She takes it everyday. She reports no side effects. No suicidal ideations, no aggressive behaviors. She has a lot of stress over last few months. Stress at work.  She worries about her 17 year old son. PHQ 9 at 7, GAY 7 and 12.     #2 hyperglycemia-she lost about 20 pounds with [employer], but gained it almost completely back.  She is planning to join them again.       #3 hypothyroidism- patient is on levothyroxine 112 µg a day.  She takes 1 tablet 5 days a week, 1.5 tablet twice a week.  She takes it on empty stomach and does not eat for at least an hour after taking the medication. TSH 0.647.    #4 Rash- on left buttock-patient felt tingling and itching about a week ago and then noticed rash with blisters.  No other symptoms associated.  Nothing makes it better or worse.    The following portions of the patient's history were reviewed and updated as appropriate: allergies, current medications, past family history, past medical history, past social history, past surgical history and problem list.    Review of Systems   Constitutional: Negative.    Respiratory: Negative.    Cardiovascular: Negative.    Skin: Positive for rash.   Psychiatric/Behavioral: Negative for suicidal ideas. The patient is nervous/anxious.          Objective   Wt Readings from Last 3 Encounters:   05/09/18 69.4 kg (153 lb)   04/16/18 69.4 kg (153 lb)   10/17/17 70.3 kg (155 lb)      Vitals:    05/09/18 1153   BP: 138/88   Pulse: 98   Temp: 98.2 °F (36.8 °C)   SpO2: 98%     Temp Readings from Last 3 Encounters:   05/09/18 98.2 °F (36.8 °C)   02/02/18 98.2 °F (36.8 °C)   10/17/17 98.2 °F (36.8 °C)     BP Readings from Last 3 Encounters:   05/09/18 138/88   04/16/18 140/88   02/02/18 117/87      Pulse Readings from Last 3 Encounters:   05/09/18 98   02/02/18 99   10/17/17 87       Physical Exam   Constitutional: She is oriented to person, place, and time. She appears well-developed and well-nourished.   HENT:   Head: Normocephalic and atraumatic.   Neck: Neck supple. Carotid bruit is not present. No thyromegaly present.   Cardiovascular: Normal rate, regular rhythm and normal heart sounds.    Pulmonary/Chest: Effort normal and breath sounds normal.   Neurological: She is alert and oriented to person, place, and time.   Skin: Skin is warm, dry and intact.   Group of small blisters on red base on rt buttock. No other skin lesions.   Psychiatric: She has a normal mood and affect. Her behavior is normal.       Assessment/Plan   Anu was seen today for rash, hypothyroidism, depression and hyperglycemia.    Diagnoses and all orders for this visit:    Mixed anxiety depressive disorder    Hypothyroidism, unspecified type    Hyperglycemia  -     Hemoglobin A1c    Herpes zoster without complication    Other orders  -     sertraline (ZOLOFT) 100 MG tablet; Take 0.5 tablets by mouth Daily.  -     levothyroxine (SYNTHROID) 112 MCG tablet; One tablet daily except Sunday and Wednesday 1.5  -     valACYclovir (VALTREX) 1000 MG tablet; Take 1 tablet by mouth 3 (Three) Times a Day.        #1 Depression with anxiety-uncontrolled.  We are increasing Zoloft to 100 mg a day.  Follow-up in 6 weeks, or sooner if problems.      #2 impaired fasting glucose-check A1c.  Weight loss can help with decreasing the risk of developing diabetes.  Patient will joined [employer] again.       #3 hypothyroidism-controlled.  Continue same.  Follow-up in 12 months.      #4 shingles-prevention discussed.  Valtrex 1 g 3 times a day for one week.  F/up as needed.

## 2018-05-10 LAB
HBA1C MFR BLD: 5.75 % (ref 4.8–5.6)
T4 FREE SERPL-MCNC: 2.05 NG/DL (ref 0.82–1.77)
TSH SERPL DL<=0.005 MIU/L-ACNC: 0.2 UIU/ML (ref 0.45–4.5)

## 2018-05-14 DIAGNOSIS — E03.9 ACQUIRED HYPOTHYROIDISM: Primary | ICD-10-CM

## 2018-07-06 ENCOUNTER — OFFICE VISIT (OUTPATIENT)
Dept: INTERNAL MEDICINE | Facility: CLINIC | Age: 48
End: 2018-07-06

## 2018-07-06 VITALS
WEIGHT: 157.13 LBS | TEMPERATURE: 98.3 F | OXYGEN SATURATION: 98 % | HEIGHT: 63 IN | SYSTOLIC BLOOD PRESSURE: 108 MMHG | HEART RATE: 62 BPM | DIASTOLIC BLOOD PRESSURE: 78 MMHG | BODY MASS INDEX: 27.84 KG/M2

## 2018-07-06 DIAGNOSIS — Z23 NEED FOR TDAP VACCINATION: ICD-10-CM

## 2018-07-06 DIAGNOSIS — F41.8 MIXED ANXIETY DEPRESSIVE DISORDER: Primary | ICD-10-CM

## 2018-07-06 PROCEDURE — 90471 IMMUNIZATION ADMIN: CPT | Performed by: FAMILY MEDICINE

## 2018-07-06 PROCEDURE — 90715 TDAP VACCINE 7 YRS/> IM: CPT | Performed by: FAMILY MEDICINE

## 2018-07-06 PROCEDURE — 99213 OFFICE O/P EST LOW 20 MIN: CPT | Performed by: FAMILY MEDICINE

## 2018-07-06 RX ORDER — SERTRALINE HYDROCHLORIDE 100 MG/1
100 TABLET, FILM COATED ORAL DAILY
Qty: 90 TABLET | Refills: 1 | Status: SHIPPED | OUTPATIENT
Start: 2018-07-06 | End: 2018-12-17 | Stop reason: SDUPTHER

## 2018-07-06 NOTE — PROGRESS NOTES
Subjective   Anu Adam is a 48 y.o. female.   Chief Complaint   Patient presents with   • Anxiety     Pt here to follow up on anxiety. Pts doing well on zoloft 100mg once daily.        History of Present Illness     #1 depression with anxiety-patient is on Zoloft 100 mg a day. We increased dose at last office visit.  She takes it everyday. She reports no side effects. No suicidal ideations, no aggressive behaviors.  She feels much better.  Anxiety is under control.  PHQ 9 at 3 from 7, GAY 7 at 0 from 12.    #2 AR- ears congestion, nasal congestion for a few days, patient takes Zyrtec every day.  It used to help, but is not effective at this point.    The following portions of the patient's history were reviewed and updated as appropriate: allergies, current medications, past medical history, past social history and problem list.    Review of Systems   Constitutional: Negative.    HENT: Positive for congestion.    Respiratory: Negative.    Cardiovascular: Negative.    Psychiatric/Behavioral: Negative.          Objective   Wt Readings from Last 3 Encounters:   07/06/18 71.3 kg (157 lb 2 oz)   05/09/18 69.4 kg (153 lb)   04/16/18 69.4 kg (153 lb)      Vitals:    07/06/18 1133   BP: 108/78   Pulse: 62   Temp: 98.3 °F (36.8 °C)   SpO2: 98%     Temp Readings from Last 3 Encounters:   07/06/18 98.3 °F (36.8 °C)   05/09/18 98.2 °F (36.8 °C)   02/02/18 98.2 °F (36.8 °C)     BP Readings from Last 3 Encounters:   07/06/18 108/78   05/09/18 138/88   04/16/18 140/88     Pulse Readings from Last 3 Encounters:   07/06/18 62   05/09/18 98   02/02/18 99       Physical Exam   Constitutional: She is oriented to person, place, and time. She appears well-developed and well-nourished.   HENT:   Head: Normocephalic and atraumatic.   Right Ear: Tympanic membrane, external ear and ear canal normal.   Left Ear: Tympanic membrane, external ear and ear canal normal.   Nose: Nose normal.   Neck: Neck supple. Carotid bruit is not present. No  thyromegaly present.   Cardiovascular: Normal rate, regular rhythm and normal heart sounds.    Pulmonary/Chest: Effort normal and breath sounds normal.   Neurological: She is alert and oriented to person, place, and time.   Skin: Skin is warm, dry and intact.   Psychiatric: She has a normal mood and affect. Her behavior is normal.       Assessment/Plan   Anu was seen today for anxiety.    Diagnoses and all orders for this visit:    Mixed anxiety depressive disorder    Other orders  -     sertraline (ZOLOFT) 100 MG tablet; Take 1 tablet by mouth Daily.        #1 depression /anxiety-controlled.  Continue current treatment.  Follow-up in 6 months.    #2 allergic rhinitis-uncontrolled.  Patient will continue Zyrtec daily.  She will add Flonase daily.    Patient is getting tetanus vaccine today.

## 2018-08-14 ENCOUNTER — RESULTS ENCOUNTER (OUTPATIENT)
Dept: INTERNAL MEDICINE | Facility: CLINIC | Age: 48
End: 2018-08-14

## 2018-08-14 DIAGNOSIS — E03.9 ACQUIRED HYPOTHYROIDISM: ICD-10-CM

## 2018-08-21 ENCOUNTER — OFFICE VISIT (OUTPATIENT)
Dept: INTERNAL MEDICINE | Facility: CLINIC | Age: 48
End: 2018-08-21

## 2018-08-21 VITALS
WEIGHT: 157.19 LBS | HEIGHT: 63 IN | SYSTOLIC BLOOD PRESSURE: 108 MMHG | TEMPERATURE: 98.2 F | HEART RATE: 75 BPM | BODY MASS INDEX: 27.85 KG/M2 | DIASTOLIC BLOOD PRESSURE: 76 MMHG | OXYGEN SATURATION: 98 %

## 2018-08-21 DIAGNOSIS — J01.00 ACUTE NON-RECURRENT MAXILLARY SINUSITIS: Primary | ICD-10-CM

## 2018-08-21 PROCEDURE — 99213 OFFICE O/P EST LOW 20 MIN: CPT | Performed by: NURSE PRACTITIONER

## 2018-08-21 RX ORDER — BENZONATATE 100 MG/1
100 CAPSULE ORAL 3 TIMES DAILY PRN
Qty: 30 CAPSULE | Refills: 0 | Status: SHIPPED | OUTPATIENT
Start: 2018-08-21 | End: 2019-01-14

## 2018-08-21 RX ORDER — AMOXICILLIN 875 MG/1
875 TABLET, COATED ORAL 2 TIMES DAILY
Qty: 20 TABLET | Refills: 0 | Status: SHIPPED | OUTPATIENT
Start: 2018-08-21 | End: 2019-01-14

## 2018-08-21 NOTE — PROGRESS NOTES
Subjective   Anu Adam is a 48 y.o. female. Patient is here today for   Chief Complaint   Patient presents with   • URI     Pt complains of having chest congestion, nasal congestion with bilateral ear pain x1 week. Pt has been taking mucinex & zyrtec.    .    History of Present Illness   C/o sinus pressure x 1 week associated with headache, sore throat, post-nasal drainage. She started with chest congestion and cough this weekend.   She has tried Zyrtec, mucinex, nose spray with minimal relief.   Denies fever, but has had chills.   Denies sick contacts.     The following portions of the patient's history were reviewed and updated as appropriate: allergies, current medications, past family history, past medical history, past social history, past surgical history and problem list.    Review of Systems   Constitutional: Positive for chills.   HENT: Positive for congestion, ear pain, postnasal drip, sinus pressure and sore throat.    Respiratory: Positive for cough. Negative for shortness of breath and wheezing.    Cardiovascular: Negative.        Objective   Vitals:    08/21/18 1503   BP: 108/76   Pulse: 75   Temp: 98.2 °F (36.8 °C)   SpO2: 98%     Physical Exam   Constitutional: Vital signs are normal. She appears well-developed and well-nourished.   HENT:   Right Ear: Ear canal normal. A middle ear effusion is present.   Left Ear: Ear canal normal. A middle ear effusion is present.   Nose: Right sinus exhibits maxillary sinus tenderness. Left sinus exhibits maxillary sinus tenderness.   Mouth/Throat: Mucous membranes are normal. Posterior oropharyngeal erythema present.   Cardiovascular: Normal rate, regular rhythm and normal heart sounds.    Pulmonary/Chest: Effort normal and breath sounds normal.   Lymphadenopathy:     She has no cervical adenopathy.   Skin: Skin is warm, dry and intact.   Psychiatric: She has a normal mood and affect. Her speech is normal and behavior is normal. Thought content normal.    Nursing note and vitals reviewed.      Assessment/Plan   Anu was seen today for uri.    Diagnoses and all orders for this visit:    Acute non-recurrent maxillary sinusitis  -     amoxicillin (AMOXIL) 875 MG tablet; Take 1 tablet by mouth 2 (Two) Times a Day.  -     benzonatate (TESSALON PERLES) 100 MG capsule; Take 1 capsule by mouth 3 (Three) Times a Day As Needed for Cough.      Patient will continue with Mucinex.

## 2018-12-17 RX ORDER — SERTRALINE HYDROCHLORIDE 100 MG/1
TABLET, FILM COATED ORAL
Qty: 90 TABLET | Refills: 0 | Status: SHIPPED | OUTPATIENT
Start: 2018-12-17 | End: 2019-02-25 | Stop reason: SDUPTHER

## 2019-01-14 ENCOUNTER — OFFICE VISIT (OUTPATIENT)
Dept: INTERNAL MEDICINE | Facility: CLINIC | Age: 49
End: 2019-01-14

## 2019-01-14 VITALS
OXYGEN SATURATION: 98 % | DIASTOLIC BLOOD PRESSURE: 68 MMHG | WEIGHT: 152.13 LBS | TEMPERATURE: 98.6 F | HEART RATE: 62 BPM | SYSTOLIC BLOOD PRESSURE: 92 MMHG | BODY MASS INDEX: 26.95 KG/M2 | HEIGHT: 63 IN

## 2019-01-14 DIAGNOSIS — J01.00 ACUTE NON-RECURRENT MAXILLARY SINUSITIS: Primary | ICD-10-CM

## 2019-01-14 DIAGNOSIS — M54.9 ACUTE BACK PAIN, UNSPECIFIED BACK LOCATION, UNSPECIFIED BACK PAIN LATERALITY: ICD-10-CM

## 2019-01-14 LAB
BILIRUB BLD-MCNC: NEGATIVE MG/DL
CLARITY, POC: CLEAR
COLOR UR: YELLOW
GLUCOSE UR STRIP-MCNC: NEGATIVE MG/DL
KETONES UR QL: NEGATIVE
LEUKOCYTE EST, POC: NEGATIVE
NITRITE UR-MCNC: NEGATIVE MG/ML
PH UR: 5.5 [PH] (ref 5–8)
PROT UR STRIP-MCNC: NEGATIVE MG/DL
RBC # UR STRIP: NEGATIVE /UL
SP GR UR: 1.03 (ref 1–1.03)
UROBILINOGEN UR QL: NORMAL

## 2019-01-14 PROCEDURE — 81003 URINALYSIS AUTO W/O SCOPE: CPT | Performed by: NURSE PRACTITIONER

## 2019-01-14 PROCEDURE — 99213 OFFICE O/P EST LOW 20 MIN: CPT | Performed by: NURSE PRACTITIONER

## 2019-01-14 RX ORDER — AMOXICILLIN 875 MG/1
875 TABLET, COATED ORAL 2 TIMES DAILY
Qty: 20 TABLET | Refills: 0 | Status: SHIPPED | OUTPATIENT
Start: 2019-01-14 | End: 2019-02-25

## 2019-01-14 NOTE — PROGRESS NOTES
Subjective   Anu Adam is a 48 y.o. female. Patient is here today for   Chief Complaint   Patient presents with   • URI     Pt complains of having bilateral ear pain, a dry cough, sinus congestion & bodyches x1 week.    • Flank Pain     Pt complains of having flank pain.    .    History of Present Illness   C/o nasal congestion x 1 week associated with sinus pressure, bilateral ear pain, dry cough, body aches. Denies fever. She has tried Advil Cold and Sinus and Mucinex.     C/o some lower back pain for a few days. She does have a history of kidney stones. Denies burning with urination, frequency.    The following portions of the patient's history were reviewed and updated as appropriate: allergies, current medications, past family history, past medical history, past social history, past surgical history and problem list.    Review of Systems   Constitutional: Positive for fatigue.   HENT: Positive for congestion, postnasal drip and sinus pressure. Negative for sore throat.    Respiratory: Positive for cough. Negative for shortness of breath and wheezing.    Cardiovascular: Negative.    Gastrointestinal: Positive for nausea. Negative for abdominal pain, constipation, diarrhea and vomiting.   Genitourinary: Positive for flank pain. Negative for dysuria and frequency.   Musculoskeletal: Positive for back pain.       Objective   Vitals:    01/14/19 1459   BP: 92/68   Pulse: 62   Temp: 98.6 °F (37 °C)   SpO2: 98%     Physical Exam   Constitutional: Vital signs are normal. She appears well-developed and well-nourished.   HENT:   Right Ear: Ear canal normal. Tympanic membrane is bulging. A middle ear effusion is present.   Left Ear: Ear canal normal. A middle ear effusion is present.   Nose: Right sinus exhibits maxillary sinus tenderness. Left sinus exhibits maxillary sinus tenderness.   Mouth/Throat: Oropharynx is clear and moist and mucous membranes are normal.   Cardiovascular: Normal rate, regular rhythm and  normal heart sounds.   Pulmonary/Chest: Effort normal and breath sounds normal.   Abdominal: There is no CVA tenderness.   Lymphadenopathy:     She has no cervical adenopathy.   Skin: Skin is warm, dry and intact.   Psychiatric: She has a normal mood and affect. Her speech is normal and behavior is normal. Thought content normal.   Nursing note and vitals reviewed.    Results for orders placed or performed in visit on 01/14/19   POCT urinalysis dipstick, automated   Result Value Ref Range    Color Yellow Yellow, Straw, Dark Yellow, Thelma    Clarity, UA Clear Clear    Specific Gravity  1.030 1.005 - 1.030    pH, Urine 5.5 5.0 - 8.0    Leukocytes Negative Negative    Nitrite, UA Negative Negative    Protein, POC Negative Negative mg/dL    Glucose, UA Negative Negative, 1000 mg/dL (3+) mg/dL    Ketones, UA Negative Negative    Urobilinogen, UA Normal Normal    Bilirubin Negative Negative    Blood, UA Negative Negative         Assessment/Plan   Anu was seen today for uri and flank pain.    Diagnoses and all orders for this visit:    Acute non-recurrent maxillary sinusitis  -     amoxicillin (AMOXIL) 875 MG tablet; Take 1 tablet by mouth 2 (Two) Times a Day.    Acute back pain, unspecified back location, unspecified back pain laterality  -     POCT urinalysis dipstick, automated      Instructed patient to continue with mucinex and to take her flonase daily.

## 2019-01-27 LAB — TSH SERPL DL<=0.005 MIU/L-ACNC: 0.53 UIU/ML (ref 0.45–4.5)

## 2019-02-25 ENCOUNTER — OFFICE VISIT (OUTPATIENT)
Dept: INTERNAL MEDICINE | Facility: CLINIC | Age: 49
End: 2019-02-25

## 2019-02-25 VITALS
DIASTOLIC BLOOD PRESSURE: 70 MMHG | SYSTOLIC BLOOD PRESSURE: 100 MMHG | OXYGEN SATURATION: 98 % | BODY MASS INDEX: 26.93 KG/M2 | TEMPERATURE: 98 F | WEIGHT: 152 LBS | HEIGHT: 63 IN | HEART RATE: 78 BPM

## 2019-02-25 DIAGNOSIS — E03.9 ACQUIRED HYPOTHYROIDISM: Primary | ICD-10-CM

## 2019-02-25 DIAGNOSIS — R73.9 HYPERGLYCEMIA: ICD-10-CM

## 2019-02-25 DIAGNOSIS — Z00.00 PHYSICAL EXAM, ANNUAL: ICD-10-CM

## 2019-02-25 DIAGNOSIS — J30.89 CHRONIC NON-SEASONAL ALLERGIC RHINITIS: ICD-10-CM

## 2019-02-25 DIAGNOSIS — F41.8 MIXED ANXIETY DEPRESSIVE DISORDER: ICD-10-CM

## 2019-02-25 DIAGNOSIS — J02.9 SORE THROAT: ICD-10-CM

## 2019-02-25 LAB
EXPIRATION DATE: NORMAL
INTERNAL CONTROL: NORMAL
Lab: NORMAL
S PYO AG THROAT QL: NEGATIVE

## 2019-02-25 PROCEDURE — 87880 STREP A ASSAY W/OPTIC: CPT | Performed by: FAMILY MEDICINE

## 2019-02-25 PROCEDURE — 99214 OFFICE O/P EST MOD 30 MIN: CPT | Performed by: FAMILY MEDICINE

## 2019-02-25 RX ORDER — FLUTICASONE PROPIONATE 50 MCG
2 SPRAY, SUSPENSION (ML) NASAL DAILY
Qty: 1 BOTTLE | Refills: 5 | Status: SHIPPED | OUTPATIENT
Start: 2019-02-25 | End: 2019-11-11 | Stop reason: SDUPTHER

## 2019-02-25 RX ORDER — LEVOTHYROXINE SODIUM 112 UG/1
TABLET ORAL
Qty: 112 TABLET | Refills: 3 | Status: SHIPPED | OUTPATIENT
Start: 2019-02-25 | End: 2019-11-11 | Stop reason: SDUPTHER

## 2019-02-25 RX ORDER — AZELASTINE HCL 205.5 UG/1
1 SPRAY NASAL 2 TIMES DAILY
Qty: 1 EACH | Refills: 5 | Status: SHIPPED | OUTPATIENT
Start: 2019-02-25 | End: 2019-11-11 | Stop reason: SDUPTHER

## 2019-02-25 RX ORDER — SERTRALINE HYDROCHLORIDE 100 MG/1
150 TABLET, FILM COATED ORAL DAILY
Qty: 45 TABLET | Refills: 1 | Status: SHIPPED | OUTPATIENT
Start: 2019-02-25 | End: 2019-03-17 | Stop reason: SDUPTHER

## 2019-02-25 NOTE — PROGRESS NOTES
Subjective   Anu Adam is a 49 y.o. female.   Chief Complaint   Patient presents with   • Hypothyroidism   • Depression   • Allergic Rhinitis   • Sore Throat       History of Present Illness     #1  depression with anxiety-patient is on Zoloft 100 mg a day.  She takes it everyday. She reports no side effects. No suicidal ideations, no aggressive behaviors.    She has a lot of stress recently.  Her brother moved in with her and his stay is getting longer than expected.  Her son will be going to college soon. PHQ 9 at 14, GAY 7 at 13.     #2 AR- Flonase taken daily and Astepro.  Patient has no side effects.  No nosebleeds.  Symptoms are usually controlled.  She had sore throat for 2 days.  Somebody at her work was just diagnosed with strep.  No other symptoms associated.  No fever, no chills.  No cough.    #3 hypothyroidism- patient is on levothyroxine 112 µg a day.  She takes 1 tablet 6 days a week, 1.5 tablet once a week. She takes it on empty stomach and does not eat for at least an hour after taking it. TSH 0.526.      The following portions of the patient's history were reviewed and updated as appropriate: allergies, current medications, past medical history, past social history and problem list.    Review of Systems   Constitutional: Negative for chills and fever.   HENT: Positive for congestion and sore throat.    Respiratory: Negative for cough and shortness of breath.    Cardiovascular: Negative.    Psychiatric/Behavioral: Negative for suicidal ideas. The patient is nervous/anxious.          Objective   Wt Readings from Last 3 Encounters:   02/25/19 68.9 kg (152 lb)   01/14/19 69 kg (152 lb 2 oz)   08/21/18 71.3 kg (157 lb 3 oz)      Vitals:    02/25/19 0721   BP: 100/70   Pulse: 78   Temp: 98 °F (36.7 °C)   SpO2: 98%     Temp Readings from Last 3 Encounters:   02/25/19 98 °F (36.7 °C)   01/14/19 98.6 °F (37 °C)   08/21/18 98.2 °F (36.8 °C)     BP Readings from Last 3 Encounters:   02/25/19 100/70    01/14/19 92/68   08/21/18 108/76     Pulse Readings from Last 3 Encounters:   02/25/19 78   01/14/19 62   08/21/18 75       Physical Exam   Constitutional: She is oriented to person, place, and time. She appears well-developed and well-nourished.   HENT:   Head: Normocephalic and atraumatic.   Neck: Neck supple. Carotid bruit is not present. No thyromegaly present.   Cardiovascular: Normal rate, regular rhythm and normal heart sounds.   Pulmonary/Chest: Effort normal and breath sounds normal.   Lymphadenopathy:     She has no cervical adenopathy.   Neurological: She is alert and oriented to person, place, and time.   Skin: Skin is warm, dry and intact.   Psychiatric: She has a normal mood and affect. Her behavior is normal.       Assessment/Plan   Anu was seen today for hypothyroidism, depression, allergic rhinitis and sore throat.    Diagnoses and all orders for this visit:    Acquired hypothyroidism    Mixed anxiety depressive disorder    Chronic non-seasonal allergic rhinitis    Hyperglycemia  -     Hemoglobin A1c; Future    Physical exam, annual  -     CBC (No Diff); Future  -     Vitamin D 25 Hydroxy; Future  -     Lipid Panel With LDL / HDL Ratio; Future  -     Comprehensive Metabolic Panel; Future    Sore throat  -     POCT rapid strep A    Other orders  -     sertraline (ZOLOFT) 100 MG tablet; Take 1.5 tablets by mouth Daily.  -     levothyroxine (SYNTHROID) 112 MCG tablet; One tablet daily except Sunday when you take 1.5tb  -     azelastine (ASTEPRO) 0.15 % solution nasal spray; 1 spray into the nostril(s) as directed by provider 2 (Two) Times a Day.  -     fluticasone (FLONASE) 50 MCG/ACT nasal spray; 2 sprays into the nostril(s) as directed by provider Daily.        #1 depression with anxiety-uncontrolled.  We are increasing the Zoloft to 150 mg a day.  Follow-up in 6 weeks.    2.  Hypothyroidism-continue same.  Follow-up in 12 months.    3.  Allergic rhinitis-continue Flonase and Astepro.  Follow-up  in 6-12 months.    4.  Sore throat-strep test negative.  Symptomatic treatment.  Follow-up as needed

## 2019-03-06 RX ORDER — ESTRADIOL AND NORETHINDRONE ACETATE 1; .5 MG/1; MG/1
TABLET ORAL
Qty: 84 TABLET | Refills: 0 | Status: SHIPPED | OUTPATIENT
Start: 2019-03-06 | End: 2019-04-22 | Stop reason: SDUPTHER

## 2019-03-18 RX ORDER — SERTRALINE HYDROCHLORIDE 100 MG/1
TABLET, FILM COATED ORAL
Qty: 90 TABLET | Refills: 0 | Status: SHIPPED | OUTPATIENT
Start: 2019-03-18 | End: 2019-04-09 | Stop reason: SDUPTHER

## 2019-03-25 ENCOUNTER — OFFICE VISIT (OUTPATIENT)
Dept: INTERNAL MEDICINE | Facility: CLINIC | Age: 49
End: 2019-03-25

## 2019-03-25 VITALS
SYSTOLIC BLOOD PRESSURE: 110 MMHG | TEMPERATURE: 98.6 F | HEIGHT: 63 IN | HEART RATE: 75 BPM | BODY MASS INDEX: 26.93 KG/M2 | WEIGHT: 152 LBS | OXYGEN SATURATION: 98 % | DIASTOLIC BLOOD PRESSURE: 70 MMHG

## 2019-03-25 DIAGNOSIS — R52 GENERALIZED BODY ACHES: Primary | ICD-10-CM

## 2019-03-25 DIAGNOSIS — Z00.00 PHYSICAL EXAM, ANNUAL: ICD-10-CM

## 2019-03-25 DIAGNOSIS — R73.9 HYPERGLYCEMIA: ICD-10-CM

## 2019-03-25 LAB
EXPIRATION DATE: NORMAL
FLUAV AG NPH QL: NEGATIVE
FLUBV AG NPH QL: NEGATIVE
INTERNAL CONTROL: NORMAL
Lab: NORMAL

## 2019-03-25 PROCEDURE — 99213 OFFICE O/P EST LOW 20 MIN: CPT | Performed by: NURSE PRACTITIONER

## 2019-03-25 PROCEDURE — 87804 INFLUENZA ASSAY W/OPTIC: CPT | Performed by: NURSE PRACTITIONER

## 2019-03-25 NOTE — PROGRESS NOTES
Subjective   Anu Adam is a 49 y.o. female. Patient is here today for   Chief Complaint   Patient presents with   • Generalized Body Aches   • Fatigue   • Diarrhea   .    History of Present Illness   C/o chills x 3 days associated with generalized body aches, diarrhea, headache. She did take Advil cold and sinus with some relief. She has had have some left ear pain for about a week.   She has had her flu vaccine. Denies cough, wheezing, fever. Denies sick contacts     The following portions of the patient's history were reviewed and updated as appropriate: allergies, current medications, past family history, past medical history, past social history, past surgical history and problem list.    Review of Systems   Constitutional: Positive for chills and fatigue.   HENT: Positive for congestion and ear pain.    Respiratory: Negative.    Cardiovascular: Negative.    Gastrointestinal: Positive for nausea.       Objective   Vitals:    03/25/19 1552   BP: 110/70   Pulse: 75   Temp: 98.6 °F (37 °C)   SpO2: 98%     Physical Exam   Constitutional: Vital signs are normal. She appears well-developed and well-nourished.   HENT:   Right Ear: Tympanic membrane and ear canal normal.   Left Ear: Tympanic membrane and ear canal normal.   Mouth/Throat: Oropharynx is clear and moist and mucous membranes are normal.   Cardiovascular: Normal rate, regular rhythm and normal heart sounds.   Pulmonary/Chest: Effort normal and breath sounds normal.   Lymphadenopathy:     She has no cervical adenopathy.   Skin: Skin is warm, dry and intact.   Psychiatric: She has a normal mood and affect. Her speech is normal and behavior is normal. Thought content normal.   Nursing note and vitals reviewed.    Results for orders placed or performed in visit on 03/25/19   POCT Influenza A/B   Result Value Ref Range    Rapid Influenza A Ag Negative Negative    Rapid Influenza B Ag Negative Negative    Internal Control Passed Passed    Lot Number [id number]      Expiration Date 03/21/21        Assessment/Plan   Anu was seen today for generalized body aches, fatigue and diarrhea.    Diagnoses and all orders for this visit:    Generalized body aches  -     POCT Influenza A/B        Patient likely has a virus. Continue OTC treatment as needed. Tylenol or ibuprofen for body aches.

## 2019-04-01 LAB
25(OH)D3+25(OH)D2 SERPL-MCNC: 31.4 NG/ML (ref 30–100)
ALBUMIN SERPL-MCNC: 4.9 G/DL (ref 3.5–5.5)
ALBUMIN/GLOB SERPL: 2.1 {RATIO} (ref 1.2–2.2)
ALP SERPL-CCNC: 93 IU/L (ref 39–117)
ALT SERPL-CCNC: 18 IU/L (ref 0–32)
AST SERPL-CCNC: 19 IU/L (ref 0–40)
BILIRUB SERPL-MCNC: 0.5 MG/DL (ref 0–1.2)
BUN SERPL-MCNC: 16 MG/DL (ref 6–24)
BUN/CREAT SERPL: 19 (ref 9–23)
CALCIUM SERPL-MCNC: 9.7 MG/DL (ref 8.7–10.2)
CHLORIDE SERPL-SCNC: 103 MMOL/L (ref 96–106)
CHOLEST SERPL-MCNC: 236 MG/DL (ref 100–199)
CO2 SERPL-SCNC: 24 MMOL/L (ref 20–29)
CREAT SERPL-MCNC: 0.83 MG/DL (ref 0.57–1)
ERYTHROCYTE [DISTWIDTH] IN BLOOD BY AUTOMATED COUNT: 12.5 % (ref 12.3–15.4)
GLOBULIN SER CALC-MCNC: 2.3 G/DL (ref 1.5–4.5)
GLUCOSE SERPL-MCNC: 103 MG/DL (ref 65–99)
HBA1C MFR BLD: 5.4 % (ref 4.8–5.6)
HCT VFR BLD AUTO: 45.5 % (ref 34–46.6)
HDLC SERPL-MCNC: 39 MG/DL
HGB BLD-MCNC: 15 G/DL (ref 11.1–15.9)
LDLC SERPL CALC-MCNC: 155 MG/DL (ref 0–99)
LDLC/HDLC SERPL: 4 RATIO (ref 0–3.2)
MCH RBC QN AUTO: 30.9 PG (ref 26.6–33)
MCHC RBC AUTO-ENTMCNC: 33 G/DL (ref 31.5–35.7)
MCV RBC AUTO: 94 FL (ref 79–97)
PLATELET # BLD AUTO: 336 X10E3/UL (ref 150–379)
POTASSIUM SERPL-SCNC: 4.4 MMOL/L (ref 3.5–5.2)
PROT SERPL-MCNC: 7.2 G/DL (ref 6–8.5)
RBC # BLD AUTO: 4.86 X10E6/UL (ref 3.77–5.28)
SODIUM SERPL-SCNC: 142 MMOL/L (ref 134–144)
TRIGL SERPL-MCNC: 208 MG/DL (ref 0–149)
VLDLC SERPL CALC-MCNC: 42 MG/DL (ref 5–40)
WBC # BLD AUTO: 6.9 X10E3/UL (ref 3.4–10.8)

## 2019-04-09 ENCOUNTER — OFFICE VISIT (OUTPATIENT)
Dept: INTERNAL MEDICINE | Facility: CLINIC | Age: 49
End: 2019-04-09

## 2019-04-09 VITALS
DIASTOLIC BLOOD PRESSURE: 80 MMHG | SYSTOLIC BLOOD PRESSURE: 110 MMHG | BODY MASS INDEX: 28.12 KG/M2 | TEMPERATURE: 98 F | HEART RATE: 83 BPM | OXYGEN SATURATION: 95 % | WEIGHT: 152.8 LBS | HEIGHT: 62 IN

## 2019-04-09 DIAGNOSIS — Z00.00 PHYSICAL EXAM, ANNUAL: Primary | ICD-10-CM

## 2019-04-09 DIAGNOSIS — F41.8 MIXED ANXIETY DEPRESSIVE DISORDER: ICD-10-CM

## 2019-04-09 PROCEDURE — 99396 PREV VISIT EST AGE 40-64: CPT | Performed by: FAMILY MEDICINE

## 2019-04-09 RX ORDER — SERTRALINE HYDROCHLORIDE 100 MG/1
150 TABLET, FILM COATED ORAL DAILY
Qty: 135 TABLET | Refills: 1 | Status: SHIPPED | OUTPATIENT
Start: 2019-04-09 | End: 2019-06-15 | Stop reason: SDUPTHER

## 2019-04-09 NOTE — PATIENT INSTRUCTIONS

## 2019-04-09 NOTE — PROGRESS NOTES
Subjective   Anu Adam is a 49 y.o. female.   Chief Complaint   Patient presents with   • Annual Exam   • Anxiety       History of Present Illness     Patient is here for complete physical exam without GYN evaluation and to follow-up on depression and anxiety.    1.CPE-patient has no complaints.  There is no change in medical, surgical or family history within the last year.  No change in medications except of the change of dose of sertraline and will discuss it later.  No change in over-the-counter medications.  Patient takes multivitamin and Vitamin C once a day.  She is not allergic to medications.  She does not smoke cigarettes.  She drinks alcohol only occasionally on average once a month.  She does not use drugs.  She started to walk at work when the weather is good, she is planning to get to the gym 3-4 times a week.  She started making changes in her diet.  Plan is to decrease amount of carbs and to decrease portion size.  She has dental appointments every 6 months.  Her last eye exam was a year ago.  She wears glasses.  She had GYN evaluation in April 2018 and is scheduled in 2 weeks.  She gets Pap smear, pelvic and breast exam by her GYN.  She gets mammograms done in her GYN office.  She sees Dr. Rox Arnold.  She had colonoscopy in April 2015 by Dr. Acosta.  Started screening for colon cancer at age of 35.  She gets colonoscopy every 5 years.  Her mom was diagnosed with colon cancer in her early 50s.  She had tetanus vaccine in 2018.  She had hepatitis A/B vaccine #1 in September 2018, #2 in March 2019.  She had flu vaccine this season.    2.  Depression with anxiety-at last office visit we increased Zoloft to 150 mg.  Patient noticed significant improvement.  She is more active, she is more interested in doing things.  She is not edgy.  Anxiety is much better.  She has no side effects.  No suicidal ideations, no aggressive behaviors.  PHQ 9 at 3, GAY 7 at 2.    The following portions of the  patient's history were reviewed and updated as appropriate: allergies, current medications, past family history, past medical history, past social history, past surgical history and problem list.    Review of Systems   Constitutional: Negative.    HENT: Negative.    Respiratory: Negative.    Cardiovascular: Negative.    Gastrointestinal: Negative for blood in stool.   Genitourinary: Negative.    Psychiatric/Behavioral: Negative.          Objective   Wt Readings from Last 3 Encounters:   04/09/19 69.3 kg (152 lb 12.8 oz)   03/25/19 68.9 kg (152 lb)   02/25/19 68.9 kg (152 lb)      Vitals:    04/09/19 0723   BP: 110/80   Pulse: 83   Temp: 98 °F (36.7 °C)   SpO2: 95%     Temp Readings from Last 3 Encounters:   04/09/19 98 °F (36.7 °C)   03/25/19 98.6 °F (37 °C)   02/25/19 98 °F (36.7 °C)     BP Readings from Last 3 Encounters:   04/09/19 110/80   03/25/19 110/70   02/25/19 100/70     Pulse Readings from Last 3 Encounters:   04/09/19 83   03/25/19 75   02/25/19 78       Physical Exam   Constitutional: She is oriented to person, place, and time. She appears well-developed and well-nourished. No distress.   HENT:   Head: Normocephalic and atraumatic. Hair is normal.   Right Ear: Hearing, tympanic membrane, external ear and ear canal normal. No drainage. No decreased hearing is noted.   Left Ear: Hearing, tympanic membrane, external ear and ear canal normal. No decreased hearing is noted.   Nose: No nasal deformity.   Mouth/Throat: Oropharynx is clear and moist.   Eyes: Conjunctivae, EOM and lids are normal. Pupils are equal, round, and reactive to light. Right eye exhibits no discharge. Left eye exhibits no discharge.   Neck: Normal range of motion. Neck supple. No JVD present. No tracheal deviation present. No thyromegaly present.   Cardiovascular: Normal rate, regular rhythm, normal heart sounds, intact distal pulses and normal pulses. Exam reveals no gallop and no friction rub.   No murmur heard.  Pulmonary/Chest:  Effort normal and breath sounds normal. No respiratory distress. She has no wheezes. She has no rales. She exhibits no tenderness.   Abdominal: Soft. She exhibits no distension and no mass. There is no tenderness. There is no rebound and no guarding. No hernia.   Musculoskeletal: Normal range of motion. She exhibits no edema, tenderness or deformity.   Lymphadenopathy:     She has no cervical adenopathy.   Neurological: She is alert and oriented to person, place, and time. She has normal reflexes. She displays normal reflexes. No cranial nerve deficit. She exhibits normal muscle tone. Coordination normal.   Skin: Skin is warm and dry. No rash noted. She is not diaphoretic. No erythema.   Psychiatric: She has a normal mood and affect. Her behavior is normal. Judgment and thought content normal.   Vitals reviewed.      Assessment/Plan   Anu was seen today for annual exam and anxiety.    Diagnoses and all orders for this visit:    Physical exam, annual    Mixed anxiety depressive disorder    Other orders  -     sertraline (ZOLOFT) 100 MG tablet; Take 1.5 tablets by mouth Daily.        #1 CPE-lab work results were reviewed with patient.  She will focus on her diet and exercise to improve cholesterol.  10 years ASCVD risk is calculated at 2%, so there is no indication for statin at this time.  Patient will try to get at least 30 minutes a day, 5 days a week of exercise.  She will decrease carbs and portion size.  Information of calorie count is provided to patient.  She is up-to-date with vaccinations.  She is up-to-date with cancer screening.    2.  Depression with anxiety-controlled.  Continue same.  Follow-up in 6 months.

## 2019-04-22 ENCOUNTER — APPOINTMENT (OUTPATIENT)
Dept: WOMENS IMAGING | Facility: HOSPITAL | Age: 49
End: 2019-04-22

## 2019-04-22 ENCOUNTER — OFFICE VISIT (OUTPATIENT)
Dept: OBSTETRICS AND GYNECOLOGY | Age: 49
End: 2019-04-22

## 2019-04-22 ENCOUNTER — PROCEDURE VISIT (OUTPATIENT)
Dept: OBSTETRICS AND GYNECOLOGY | Age: 49
End: 2019-04-22

## 2019-04-22 VITALS
BODY MASS INDEX: 28.89 KG/M2 | HEIGHT: 62 IN | SYSTOLIC BLOOD PRESSURE: 120 MMHG | DIASTOLIC BLOOD PRESSURE: 72 MMHG | WEIGHT: 157 LBS

## 2019-04-22 DIAGNOSIS — Z01.419 ENCOUNTER FOR GYNECOLOGICAL EXAMINATION WITHOUT ABNORMAL FINDING: Primary | ICD-10-CM

## 2019-04-22 DIAGNOSIS — Z12.4 SCREENING FOR CERVICAL CANCER: ICD-10-CM

## 2019-04-22 DIAGNOSIS — N95.1 MENOPAUSAL SYMPTOMS: ICD-10-CM

## 2019-04-22 DIAGNOSIS — Z12.31 VISIT FOR SCREENING MAMMOGRAM: Primary | ICD-10-CM

## 2019-04-22 DIAGNOSIS — Z11.51 SCREENING FOR HPV (HUMAN PAPILLOMAVIRUS): ICD-10-CM

## 2019-04-22 PROCEDURE — 99396 PREV VISIT EST AGE 40-64: CPT | Performed by: OBSTETRICS & GYNECOLOGY

## 2019-04-22 PROCEDURE — 77067 SCR MAMMO BI INCL CAD: CPT | Performed by: OBSTETRICS & GYNECOLOGY

## 2019-04-22 PROCEDURE — 77067 SCR MAMMO BI INCL CAD: CPT | Performed by: RADIOLOGY

## 2019-04-22 RX ORDER — ESTRADIOL AND NORETHINDRONE ACETATE 1; .5 MG/1; MG/1
1 TABLET ORAL DAILY
Qty: 84 TABLET | Refills: 3 | Status: SHIPPED | OUTPATIENT
Start: 2019-04-22 | End: 2019-05-20

## 2019-04-22 NOTE — PROGRESS NOTES
Routine Annual Visit    2019    Patient: Anu Adam          MR#:[id number]      Chief Complaint   Patient presents with   • Annual Exam     PT HERE FOR ROUTINE AE AND MG. SHE IS WELL WITH NO COMPLAINTS. LAST PAP PER THE PT  AT [facility].        History of Present Illness    49 y.o. female  who presents for annual exam.   Due for pap  No complaints  Worried about weight  Lost weight last year with keto type diet  Discussed exercise  Son will graduate and go to [employer], [employer] May 11  [employer]   Likes activella and wants to continue  mammo today          Patient's last menstrual period was 2013.  Obstetric History:  OB History      Para Term  AB Living    1 1 1     1    SAB TAB Ectopic Molar Multiple Live Births              1         Menstrual History:     Patient's last menstrual period was 2013.       Sexual History:       ________________________________________  Patient Active Problem List   Diagnosis   • Mixed anxiety depressive disorder   • Hypercholesterolemia   • Hyperglycemia   • Hypothyroidism   • Chronic non-seasonal allergic rhinitis       Past Medical History:   Diagnosis Date   • Anxiety    • Depression    • Hormone replacement therapy    • Hypothyroidism        Past Surgical History:   Procedure Laterality Date   •  SECTION     • CHOLECYSTECTOMY     • TONSILLECTOMY     • TUBAL ABDOMINAL LIGATION         Social History     Tobacco Use   Smoking Status Never Smoker   Smokeless Tobacco Never Used       has a current medication list which includes the following prescription(s): azelastine, estradiol-norethindrone, fluticasone, levothyroxine, and sertraline.  ________________________________________    Current contraception: post menopausal status  History of abnormal Pap smear: no  Family history of Breast cancer: no  Family history of uterine or ovarian cancer: no  Family History of colon cancer/colon polyps: yes - mother  History of abnormal  "mammogram: no      The following portions of the patient's history were reviewed and updated as appropriate: allergies, current medications, past family history, past medical history, past social history, past surgical history and problem list.    Review of Systems    Pertinent items are noted in HPI.     Objective   Physical Exam    /72   Ht 157.5 cm (62\")   Wt 71.2 kg (157 lb)   LMP 11/16/2013   BMI 28.72 kg/m²    BP Readings from Last 3 Encounters:   04/22/19 120/72   04/09/19 110/80   03/25/19 110/70      Wt Readings from Last 3 Encounters:   04/22/19 71.2 kg (157 lb)   04/09/19 69.3 kg (152 lb 12.8 oz)   03/25/19 68.9 kg (152 lb)      BMI: Estimated body mass index is 28.72 kg/m² as calculated from the following:    Height as of this encounter: 157.5 cm (62\").    Weight as of this encounter: 71.2 kg (157 lb).      General:   alert, appears stated age and cooperative   Abdomen: soft, non-tender, without masses or organomegaly   Breast: inspection negative, no nipple discharge or bleeding, no masses or nodularity palpable   Vulva: normal   Vagina: normal mucosa   Cervix: no cervical motion tenderness and no lesions   Uterus: normal size, mobile or non-tender   Adnexa: no mass, fullness, tenderness     Assessment:    1. Normal annual exam   Assessment     ICD-10-CM ICD-9-CM   1. Encounter for gynecological examination without abnormal finding Z01.419 V72.31   2. Screening for cervical cancer Z12.4 V76.2   3. Screening for HPV (human papillomavirus) Z11.51 V73.81     Plan:    Plan     [x]  Mammogram request made  [x]  PAP done  []  Labs:   []  GC/Chl/TV  []  DEXA scan   []  Referral for colonoscopy:       Anu was seen today for annual exam.    Diagnoses and all orders for this visit:    Encounter for gynecological examination without abnormal finding  -     IGP, Aptima HPV, Rfx 16 / 18,45    Screening for cervical cancer  -     IGP, Aptima HPV, Rfx 16 / 18,45    Screening for HPV (human " papillomavirus)  -     IGP, Aptima HPV, Rfx 16 / 18,45            Counseling:  --Nutrition: Stressed importance of moderation and caloric balance, stressed fresh fruit and vegetables  --Exercise: Stressed the importance of regular exercise. 3-5 times weekly   - Discussed screening mammogram recommendations.   --Discussed benefits of screening colonoscopy- age 45 unless FH  --Discussed pap smear screening recommendations

## 2019-04-25 LAB
CYTOLOGIST CVX/VAG CYTO: NORMAL
CYTOLOGY CVX/VAG DOC THIN PREP: NORMAL
DX ICD CODE: NORMAL
HIV 1 & 2 AB SER-IMP: NORMAL
HPV I/H RISK 4 DNA CVX QL PROBE+SIG AMP: NEGATIVE
OTHER STN SPEC: NORMAL
PATH REPORT.FINAL DX SPEC: NORMAL
STAT OF ADQ CVX/VAG CYTO-IMP: NORMAL

## 2019-05-17 ENCOUNTER — TELEPHONE (OUTPATIENT)
Dept: OBSTETRICS AND GYNECOLOGY | Age: 49
End: 2019-05-17

## 2019-05-17 DIAGNOSIS — R92.8 ABNORMAL MAMMOGRAM: Primary | ICD-10-CM

## 2019-05-17 NOTE — TELEPHONE ENCOUNTER
PT LEFT VOICEMAIL, IS NEEDING A GENERIC OR LESS EXPENSIVE HRT MED SENT IN. CURRENTLY ON [employer] AND HAS NEW INS AND RX IS SUPER EXPENSIVE. THANKS LAST SEEN 4/22/19.

## 2019-05-20 ENCOUNTER — TELEPHONE (OUTPATIENT)
Dept: OBSTETRICS AND GYNECOLOGY | Age: 49
End: 2019-05-20

## 2019-05-20 RX ORDER — ESTRADIOL 1 MG/1
1 TABLET ORAL DAILY
Qty: 90 TABLET | Refills: 3 | Status: SHIPPED | OUTPATIENT
Start: 2019-05-20 | End: 2020-05-27 | Stop reason: SDUPTHER

## 2019-05-20 RX ORDER — ESTRADIOL 1 MG/1
1 TABLET ORAL DAILY
Qty: 90 TABLET | Refills: 3 | Status: SHIPPED | OUTPATIENT
Start: 2019-05-20 | End: 2019-05-20 | Stop reason: SDUPTHER

## 2019-05-20 RX ORDER — MEDROXYPROGESTERONE ACETATE 2.5 MG/1
2.5 TABLET ORAL DAILY
Qty: 90 TABLET | Refills: 3 | Status: SHIPPED | OUTPATIENT
Start: 2019-05-20 | End: 2019-11-11

## 2019-05-20 NOTE — TELEPHONE ENCOUNTER
Dr Monzon pt is requesting her bc refill be sent to [facility] not the [facility] that it was sent to on 4/22/19. Please advise.

## 2019-06-17 RX ORDER — SERTRALINE HYDROCHLORIDE 100 MG/1
TABLET, FILM COATED ORAL
Qty: 90 TABLET | Refills: 0 | Status: SHIPPED | OUTPATIENT
Start: 2019-06-17 | End: 2019-09-14 | Stop reason: SDUPTHER

## 2019-09-16 RX ORDER — SERTRALINE HYDROCHLORIDE 100 MG/1
TABLET, FILM COATED ORAL
Qty: 90 TABLET | Refills: 0 | Status: SHIPPED | OUTPATIENT
Start: 2019-09-16 | End: 2019-11-11 | Stop reason: SDUPTHER

## 2019-10-04 DIAGNOSIS — E03.9 ACQUIRED HYPOTHYROIDISM: ICD-10-CM

## 2019-10-04 DIAGNOSIS — E78.00 HYPERCHOLESTEROLEMIA: Primary | ICD-10-CM

## 2019-10-04 DIAGNOSIS — R73.9 HYPERGLYCEMIA: ICD-10-CM

## 2019-10-14 LAB
BUN SERPL-MCNC: 17 MG/DL (ref 6–24)
BUN/CREAT SERPL: 23 (ref 9–23)
CALCIUM SERPL-MCNC: 9.7 MG/DL (ref 8.7–10.2)
CHLORIDE SERPL-SCNC: 102 MMOL/L (ref 96–106)
CHOLEST SERPL-MCNC: 271 MG/DL (ref 100–199)
CO2 SERPL-SCNC: 23 MMOL/L (ref 20–29)
CREAT SERPL-MCNC: 0.75 MG/DL (ref 0.57–1)
GLUCOSE SERPL-MCNC: 99 MG/DL (ref 65–99)
HBA1C MFR BLD: 5.9 % (ref 4.8–5.6)
HDLC SERPL-MCNC: 43 MG/DL
INTERPRETATION: ABNORMAL
LDLC SERPL CALC-MCNC: 177 MG/DL (ref 0–99)
LDLC/HDLC SERPL: 4.1 RATIO (ref 0–3.2)
POTASSIUM SERPL-SCNC: 4.8 MMOL/L (ref 3.5–5.2)
SODIUM SERPL-SCNC: 143 MMOL/L (ref 134–144)
T4 FREE SERPL-MCNC: 1.48 NG/DL (ref 0.82–1.77)
THYROPEROXIDASE AB SERPL-ACNC: 162 IU/ML (ref 0–34)
TRIGL SERPL-MCNC: 257 MG/DL (ref 0–149)
TSH SERPL DL<=0.005 MIU/L-ACNC: 7.26 UIU/ML (ref 0.45–4.5)
VLDLC SERPL CALC-MCNC: 51 MG/DL (ref 5–40)

## 2019-11-11 ENCOUNTER — OFFICE VISIT (OUTPATIENT)
Dept: INTERNAL MEDICINE | Facility: CLINIC | Age: 49
End: 2019-11-11

## 2019-11-11 VITALS
DIASTOLIC BLOOD PRESSURE: 80 MMHG | HEIGHT: 62 IN | BODY MASS INDEX: 30.36 KG/M2 | OXYGEN SATURATION: 99 % | HEART RATE: 95 BPM | TEMPERATURE: 98.2 F | SYSTOLIC BLOOD PRESSURE: 110 MMHG | WEIGHT: 165 LBS

## 2019-11-11 DIAGNOSIS — E78.00 HYPERCHOLESTEROLEMIA: ICD-10-CM

## 2019-11-11 DIAGNOSIS — R73.9 HYPERGLYCEMIA: ICD-10-CM

## 2019-11-11 DIAGNOSIS — E03.9 ACQUIRED HYPOTHYROIDISM: Primary | ICD-10-CM

## 2019-11-11 DIAGNOSIS — F41.8 MIXED ANXIETY DEPRESSIVE DISORDER: ICD-10-CM

## 2019-11-11 DIAGNOSIS — J30.89 CHRONIC NON-SEASONAL ALLERGIC RHINITIS: ICD-10-CM

## 2019-11-11 PROCEDURE — 99214 OFFICE O/P EST MOD 30 MIN: CPT | Performed by: FAMILY MEDICINE

## 2019-11-11 RX ORDER — LEVOTHYROXINE SODIUM 112 UG/1
TABLET ORAL
Qty: 114 TABLET | Refills: 3 | Status: SHIPPED | OUTPATIENT
Start: 2019-11-11 | End: 2020-05-12 | Stop reason: SDUPTHER

## 2019-11-11 RX ORDER — SERTRALINE HYDROCHLORIDE 100 MG/1
150 TABLET, FILM COATED ORAL DAILY
Qty: 135 TABLET | Refills: 1 | Status: SHIPPED | OUTPATIENT
Start: 2019-11-11 | End: 2020-04-30

## 2019-11-11 RX ORDER — AZELASTINE HCL 205.5 UG/1
1 SPRAY NASAL DAILY
Qty: 3 EACH | Refills: 1 | Status: SHIPPED | OUTPATIENT
Start: 2019-11-11 | End: 2020-05-12 | Stop reason: SDUPTHER

## 2019-11-11 RX ORDER — FLUTICASONE PROPIONATE 50 MCG
2 SPRAY, SUSPENSION (ML) NASAL DAILY
Qty: 3 BOTTLE | Refills: 1 | Status: SHIPPED | OUTPATIENT
Start: 2019-11-11 | End: 2020-05-12 | Stop reason: SDUPTHER

## 2019-11-11 NOTE — PROGRESS NOTES
Subjective   Anu Adam is a 49 y.o. female.   Chief Complaint   Patient presents with   • Allergic Rhinitis   • Hypothyroidism   • Depression   • Hyperglycemia       History of Present Illness     #1  depression with anxiety-patient is on Zoloft 150 mg a day.  She takes it everyday. She reports no side effects. No suicidal ideations, no aggressive behaviors.  PHQ 9 at 7, GAY 7 at 2.  Her son is a freshman in college.  She worries about him.  Other than that she is doing good.  No excessive worries.  Mood is normal most of the time.     #2 AR- on Flonase and Astepro.  Symptoms are controlled most of the time.  No side effects, but occasionally she gets dry nose.     #3 hypothyroidism- patient is on levothyroxine 112 µg a day.  She takes 1 tablet 6 days a week, 1.5 tablet once a week. She takes it on empty stomach and does not eat for at least an hour after taking it. TSH 7.2, TPA at 162, free T4 normal.    #4  Hyperglycemia-patient gained 8 pounds from last office visit.  Fasting blood sugar is 99, but A1c is up at 5.9.  BMI at 30.2.  No regular exercise.    The following portions of the patient's history were reviewed and updated as appropriate: allergies, current medications, past family history, past medical history, past social history and problem list.    Review of Systems   Constitutional: Negative.    HENT: Positive for congestion.    Respiratory: Negative.    Cardiovascular: Negative.    Psychiatric/Behavioral: Negative.  Negative for suicidal ideas.         Objective   Wt Readings from Last 3 Encounters:   11/11/19 74.8 kg (165 lb)   04/22/19 71.2 kg (157 lb)   04/09/19 69.3 kg (152 lb 12.8 oz)      Vitals:    11/11/19 0715   BP: 110/80   Pulse: 95   Temp: 98.2 °F (36.8 °C)   SpO2: 99%     Temp Readings from Last 3 Encounters:   11/11/19 98.2 °F (36.8 °C)   04/09/19 98 °F (36.7 °C)   03/25/19 98.6 °F (37 °C)     BP Readings from Last 3 Encounters:   11/11/19 110/80   04/22/19 120/72   04/09/19 110/80      Pulse Readings from Last 3 Encounters:   11/11/19 95   04/09/19 83   03/25/19 75       Physical Exam   Constitutional: She is oriented to person, place, and time. She appears well-developed and well-nourished.   HENT:   Head: Normocephalic and atraumatic.   Nose: No mucosal edema or rhinorrhea.   Neck: Neck supple. Carotid bruit is not present. No thyromegaly present.   Cardiovascular: Normal rate, regular rhythm and normal heart sounds.   Pulmonary/Chest: Effort normal and breath sounds normal.   Neurological: She is alert and oriented to person, place, and time.   Skin: Skin is warm, dry and intact.   Psychiatric: She has a normal mood and affect. Her behavior is normal.       Assessment/Plan   Anu was seen today for allergic rhinitis, hypothyroidism, depression and hyperglycemia.    Diagnoses and all orders for this visit:    Acquired hypothyroidism  -     Thyroid Cascade Profile; Future    Hyperglycemia  -     Hemoglobin A1c; Future  -     Lipid Panel With LDL / HDL Ratio; Future  -     Comprehensive Metabolic Panel; Future    Mixed anxiety depressive disorder    Chronic non-seasonal allergic rhinitis    Hypercholesterolemia  -     Hemoglobin A1c; Future  -     Lipid Panel With LDL / HDL Ratio; Future  -     Comprehensive Metabolic Panel; Future    Other orders  -     sertraline (ZOLOFT) 100 MG tablet; Take 1.5 tablets by mouth Daily.  -     levothyroxine (SYNTHROID) 112 MCG tablet; One tablet daily except Wednesday and Sunday when you take 1.5tb  -     fluticasone (FLONASE) 50 MCG/ACT nasal spray; 2 sprays into the nostril(s) as directed by provider Daily.  -     azelastine (ASTEPRO) 0.15 % solution nasal spray; 1 spray into the nostril(s) as directed by provider Daily.        #1 hypothyroidism-uncontrolled.  We are increasing dose of levothyroxine 112 mcg to 1.5  tablet on Wednesday and Sunday, other days 1 tablet a day.  Labs in 3 months before office visit.  Follow-up in 6 to 12 months.    2.   Depression anxiety-continue same.  Follow-up in 6 months.    3.  Hyperglycemia-increased risk of developing diabetes.  Weight loss can decrease it.  Information on calorie count and exercise to lose weight provided.  Follow-up in 6 months.    4.  Allergic rhinitis-continue same.  Follow-up in 6 to 12 months.  Normal saline to help with dry nose.

## 2019-11-11 NOTE — PATIENT INSTRUCTIONS
Exercising to Lose Weight  Exercise is structured, repetitive physical activity to improve fitness and health. Getting regular exercise is important for everyone. It is especially important if you are overweight. Being overweight increases your risk of heart disease, stroke, diabetes, high blood pressure, and several types of cancer. Reducing your calorie intake and exercising can help you lose weight.  Exercise is usually categorized as moderate or vigorous intensity. To lose weight, most people need to do a certain amount of moderate-intensity or vigorous-intensity exercise each week.  Moderate-intensity exercise    Moderate-intensity exercise is any activity that gets you moving enough to burn at least three times more energy (calories) than if you were sitting.  Examples of moderate exercise include:  · Walking a mile in 15 minutes.  · Doing light yard work.  · Biking at an easy pace.  Most people should get at least 150 minutes (2 hours and 30 minutes) a week of moderate-intensity exercise to maintain their body weight.  Vigorous-intensity exercise  Vigorous-intensity exercise is any activity that gets you moving enough to burn at least six times more calories than if you were sitting. When you exercise at this intensity, you should be working hard enough that you are not able to carry on a conversation.  Examples of vigorous exercise include:  · Running.  · Playing a team sport, such as football, basketball, and soccer.  · Jumping rope.  Most people should get at least 75 minutes (1 hour and 15 minutes) a week of vigorous-intensity exercise to maintain their body weight.  How can exercise affect me?  When you exercise enough to burn more calories than you eat, you lose weight. Exercise also reduces body fat and builds muscle. The more muscle you have, the more calories you burn. Exercise also:  · Improves mood.  · Reduces stress and tension.  · Improves your overall fitness, flexibility, and  endurance.  · Increases bone strength.  The amount of exercise you need to lose weight depends on:  · Your age.  · The type of exercise.  · Any health conditions you have.  · Your overall physical ability.  Talk to your health care provider about how much exercise you need and what types of activities are safe for you.  What actions can I take to lose weight?  Nutrition    · Make changes to your diet as told by your health care provider or diet and nutrition specialist (dietitian). This may include:  ? Eating fewer calories.  ? Eating more protein.  ? Eating less unhealthy fats.  ? Eating a diet that includes fresh fruits and vegetables, whole grains, low-fat dairy products, and lean protein.  ? Avoiding foods with added fat, salt, and sugar.  · Drink plenty of water while you exercise to prevent dehydration or heat stroke.  Activity  · Choose an activity that you enjoy and set realistic goals. Your health care provider can help you make an exercise plan that works for you.  · Exercise at a moderate or vigorous intensity most days of the week.  ? The intensity of exercise may vary from person to person. You can tell how intense a workout is for you by paying attention to your breathing and heartbeat. Most people will notice their breathing and heartbeat get faster with more intense exercise.  · Do resistance training twice each week, such as:  ? Push-ups.  ? Sit-ups.  ? Lifting weights.  ? Using resistance bands.  · Getting short amounts of exercise can be just as helpful as long structured periods of exercise. If you have trouble finding time to exercise, try to include exercise in your daily routine.  ? Get up, stretch, and walk around every 30 minutes throughout the day.  ? Go for a walk during your lunch break.  ? Park your car farther away from your destination.  ? If you take public transportation, get off one stop early and walk the rest of the way.  ? Make phone calls while standing up and walking  around.  ? Take the stairs instead of elevators or escalators.  · Wear comfortable clothes and shoes with good support.  · Do not exercise so much that you hurt yourself, feel dizzy, or get very short of breath.  Where to find more information  · [place] Department of Health and Human Services: www.hhs.gov  · Centers for Disease Control and Prevention (CDC): www.cdc.gov  Contact a health care provider:  · Before starting a new exercise program.  · If you have questions or concerns about your weight.  · If you have a medical problem that keeps you from exercising.  Get help right away if you have any of the following while exercising:  · Injury.  · Dizziness.  · Difficulty breathing or shortness of breath that does not go away when you stop exercising.  · Chest pain.  · Rapid heartbeat.  Summary  · Being overweight increases your risk of heart disease, stroke, diabetes, high blood pressure, and several types of cancer.  · Losing weight happens when you burn more calories than you eat.  · Reducing the amount of calories you eat in addition to getting regular moderate or vigorous exercise each week helps you lose weight.  This information is not intended to replace advice given to you by your health care provider. Make sure you discuss any questions you have with your health care provider.  Document Released: 01/20/2012 Document Revised: 12/31/2018 Document Reviewed: 12/31/2018  [email or web address] Interactive Patient Education © 2019 [email or web address]  Calorie Counting for Weight Loss  Calories are units of energy. Your body needs a certain amount of calories from food to keep you going throughout the day. When you eat more calories than your body needs, your body stores the extra calories as fat. When you eat fewer calories than your body needs, your body burns fat to get the energy it needs.  Calorie counting means keeping track of how many calories you eat and drink each day. Calorie counting can be helpful if you need to lose  weight. If you make sure to eat fewer calories than your body needs, you should lose weight. Ask your health care provider what a healthy weight is for you.  For calorie counting to work, you will need to eat the right number of calories in a day in order to lose a healthy amount of weight per week. A dietitian can help you determine how many calories you need in a day and will give you suggestions on how to reach your calorie goal.  · A healthy amount of weight to lose per week is usually 1-2 lb (0.5-0.9 kg). This usually means that your daily calorie intake should be reduced by 500-750 calories.  · Eating 1,200 - 1,500 calories per day can help most women lose weight.  · Eating 1,500 - 1,800 calories per day can help most men lose weight.  What is my plan?  My goal is to have __________ calories per day.  If I have this many calories per day, I should lose around __________ pounds per week.  What do I need to know about calorie counting?  In order to meet your daily calorie goal, you will need to:  · Find out how many calories are in each food you would like to eat. Try to do this before you eat.  · Decide how much of the food you plan to eat.  · Write down what you ate and how many calories it had. Doing this is called keeping a food log.  To successfully lose weight, it is important to balance calorie counting with a healthy lifestyle that includes regular activity. Aim for 150 minutes of moderate exercise (such as walking) or 75 minutes of vigorous exercise (such as running) each week.  Where do I find calorie information?    The number of calories in a food can be found on a Nutrition Facts label. If a food does not have a Nutrition Facts label, try to look up the calories online or ask your dietitian for help.  Remember that calories are listed per serving. If you choose to have more than one serving of a food, you will have to multiply the calories per serving by the amount of servings you plan to eat. For  "example, the label on a package of bread might say that a serving size is 1 slice and that there are 90 calories in a serving. If you eat 1 slice, you will have eaten 90 calories. If you eat 2 slices, you will have eaten 180 calories.  How do I keep a food log?  Immediately after each meal, record the following information in your food log:  · What you ate. Don't forget to include toppings, sauces, and other extras on the food.  · How much you ate. This can be measured in cups, ounces, or number of items.  · How many calories each food and drink had.  · The total number of calories in the meal.  Keep your food log near you, such as in a small notebook in your pocket, or use a mobile alison or website. Some programs will calculate calories for you and show you how many calories you have left for the day to meet your goal.  What are some calorie counting tips?    · Use your calories on foods and drinks that will fill you up and not leave you hungry:  ? Some examples of foods that fill you up are nuts and nut butters, vegetables, lean proteins, and high-fiber foods like whole grains. High-fiber foods are foods with more than 5 g fiber per serving.  ? Drinks such as sodas, specialty coffee drinks, alcohol, and juices have a lot of calories, yet do not fill you up.  · Eat nutritious foods and avoid empty calories. Empty calories are calories you get from foods or beverages that do not have many vitamins or protein, such as candy, sweets, and soda. It is better to have a nutritious high-calorie food (such as an avocado) than a food with few nutrients (such as a bag of chips).  · Know how many calories are in the foods you eat most often. This will help you calculate calorie counts faster.  · Pay attention to calories in drinks. Low-calorie drinks include water and unsweetened drinks.  · Pay attention to nutrition labels for \"low fat\" or \"fat free\" foods. These foods sometimes have the same amount of calories or more calories " than the full fat versions. They also often have added sugar, starch, or salt, to make up for flavor that was removed with the fat.  · Find a way of tracking calories that works for you. Get creative. Try different apps or programs if writing down calories does not work for you.  What are some portion control tips?  · Know how many calories are in a serving. This will help you know how many servings of a certain food you can have.  · Use a measuring cup to measure serving sizes. You could also try weighing out portions on a kitchen scale. With time, you will be able to estimate serving sizes for some foods.  · Take some time to put servings of different foods on your favorite plates, bowls, and cups so you know what a serving looks like.  · Try not to eat straight from a bag or box. Doing this can lead to overeating. Put the amount you would like to eat in a cup or on a plate to make sure you are eating the right portion.  · Use smaller plates, glasses, and bowls to prevent overeating.  · Try not to multitask (for example, watch TV or use your computer) while eating. If it is time to eat, sit down at a table and enjoy your food. This will help you to know when you are full. It will also help you to be aware of what you are eating and how much you are eating.  What are tips for following this plan?  Reading food labels  · Check the calorie count compared to the serving size. The serving size may be smaller than what you are used to eating.  · Check the source of the calories. Make sure the food you are eating is high in vitamins and protein and low in saturated and trans fats.  Shopping  · Read nutrition labels while you shop. This will help you make healthy decisions before you decide to purchase your food.  · Make a grocery list and stick to it.  Cooking  · Try to cook your favorite foods in a healthier way. For example, try baking instead of frying.  · Use low-fat dairy products.  Meal planning  · Use more fruits  "and vegetables. Half of your plate should be fruits and vegetables.  · Include lean proteins like poultry and fish.  How do I count calories when eating out?  · Ask for smaller portion sizes.  · Consider sharing an entree and sides instead of getting your own entree.  · If you get your own entree, eat only half. Ask for a box at the beginning of your meal and put the rest of your entree in it so you are not tempted to eat it.  · If calories are listed on the menu, choose the lower calorie options.  · Choose dishes that include vegetables, fruits, whole grains, low-fat dairy products, and lean protein.  · Choose items that are boiled, broiled, grilled, or steamed. Stay away from items that are buttered, battered, fried, or served with cream sauce. Items labeled \"crispy\" are usually fried, unless stated otherwise.  · Choose water, low-fat milk, unsweetened iced tea, or other drinks without added sugar. If you want an alcoholic beverage, choose a lower calorie option such as a glass of wine or light beer.  · Ask for dressings, sauces, and syrups on the side. These are usually high in calories, so you should limit the amount you eat.  · If you want a salad, choose a garden salad and ask for grilled meats. Avoid extra toppings like gray, cheese, or fried items. Ask for the dressing on the side, or ask for olive oil and vinegar or lemon to use as dressing.  · Estimate how many servings of a food you are given. For example, a serving of cooked rice is ½ cup or about the size of half a baseball. Knowing serving sizes will help you be aware of how much food you are eating at restaurants. The list below tells you how big or small some common portion sizes are based on everyday objects:  ? 1 oz--4 stacked dice.  ? 3 oz--1 deck of cards.  ? 1 tsp--1 die.  ? 1 Tbsp--½ a ping-pong ball.  ? 2 Tbsp--1 ping-pong ball.  ? ½ cup--½ baseball.  ? 1 cup--1 baseball.  Summary  · Calorie counting means keeping track of how many calories " you eat and drink each day. If you eat fewer calories than your body needs, you should lose weight.  · A healthy amount of weight to lose per week is usually 1-2 lb (0.5-0.9 kg). This usually means reducing your daily calorie intake by 500-750 calories.  · The number of calories in a food can be found on a Nutrition Facts label. If a food does not have a Nutrition Facts label, try to look up the calories online or ask your dietitian for help.  · Use your calories on foods and drinks that will fill you up, and not on foods and drinks that will leave you hungry.  · Use smaller plates, glasses, and bowls to prevent overeating.  This information is not intended to replace advice given to you by your health care provider. Make sure you discuss any questions you have with your health care provider.  Document Released: 12/18/2006 Document Revised: 09/06/2019 Document Reviewed: 11/17/2017  [employer] Patient Education © 2019 [employer]

## 2020-01-24 ENCOUNTER — TELEPHONE (OUTPATIENT)
Dept: GASTROENTEROLOGY | Facility: CLINIC | Age: 50
End: 2020-01-24

## 2020-01-24 NOTE — TELEPHONE ENCOUNTER
Returned call did [employer] health history. Colonoscopy to be done 4-. She will arrive 7 am.  Paper work mailed .

## 2020-01-24 NOTE — TELEPHONE ENCOUNTER
----- Message from Gloria Medrano RN sent at 2020  8:20 AM EST -----  Regardin yr recall cscope  Contact: [phone]  Pt is due for 5 yr recall cscope due to family hx. (last done by Dr Acosta).

## 2020-01-26 ENCOUNTER — PREP FOR SURGERY (OUTPATIENT)
Dept: OTHER | Facility: HOSPITAL | Age: 50
End: 2020-01-26

## 2020-01-26 DIAGNOSIS — Z80.0 FAMILY HISTORY OF GI MALIGNANCY: Primary | ICD-10-CM

## 2020-01-26 RX ORDER — SODIUM CHLORIDE, SODIUM LACTATE, POTASSIUM CHLORIDE, CALCIUM CHLORIDE 600; 310; 30; 20 MG/100ML; MG/100ML; MG/100ML; MG/100ML
30 INJECTION, SOLUTION INTRAVENOUS CONTINUOUS
Status: CANCELLED | OUTPATIENT
Start: 2020-04-17

## 2020-01-27 PROBLEM — Z80.0 FAMILY HISTORY OF GI MALIGNANCY: Status: ACTIVE | Noted: 2020-01-27

## 2020-01-31 DIAGNOSIS — E78.00 HYPERCHOLESTEROLEMIA: ICD-10-CM

## 2020-01-31 DIAGNOSIS — E03.9 ACQUIRED HYPOTHYROIDISM: ICD-10-CM

## 2020-01-31 DIAGNOSIS — R73.9 HYPERGLYCEMIA: ICD-10-CM

## 2020-02-12 ENCOUNTER — HOSPITAL ENCOUNTER (EMERGENCY)
Facility: HOSPITAL | Age: 50
Discharge: HOME | End: 2020-02-12
Attending: EMERGENCY MEDICINE | Admitting: EMERGENCY MEDICINE

## 2020-02-12 ENCOUNTER — APPOINTMENT (OUTPATIENT)
Dept: CT IMAGING | Facility: HOSPITAL | Age: 50
End: 2020-02-12

## 2020-02-12 VITALS
SYSTOLIC BLOOD PRESSURE: 128 MMHG | OXYGEN SATURATION: 100 % | BODY MASS INDEX: 29.44 KG/M2 | HEIGHT: 62 IN | HEART RATE: 88 BPM | WEIGHT: 160 LBS | TEMPERATURE: 97.7 F | RESPIRATION RATE: 18 BRPM | DIASTOLIC BLOOD PRESSURE: 84 MMHG

## 2020-02-12 DIAGNOSIS — R10.9 RIGHT FLANK PAIN: Primary | ICD-10-CM

## 2020-02-12 LAB
ALBUMIN SERPL-MCNC: 4.4 G/DL (ref 3.5–5.2)
ALBUMIN/GLOB SERPL: 1.8 G/DL
ALP SERPL-CCNC: 82 U/L (ref 39–117)
ALT SERPL W P-5'-P-CCNC: 39 U/L (ref 1–33)
ANION GAP SERPL CALCULATED.3IONS-SCNC: 15.1 MMOL/L (ref 5–15)
AST SERPL-CCNC: 29 U/L (ref 1–32)
BASOPHILS # BLD AUTO: 0.05 10*3/MM3 (ref 0–0.2)
BASOPHILS NFR BLD AUTO: 0.7 % (ref 0–1.5)
BILIRUB SERPL-MCNC: 0.6 MG/DL (ref 0.2–1.2)
BILIRUB UR QL STRIP: NEGATIVE
BUN BLD-MCNC: 16 MG/DL (ref 6–20)
BUN/CREAT SERPL: 20.3 (ref 7–25)
CALCIUM SPEC-SCNC: 9.4 MG/DL (ref 8.6–10.5)
CHLORIDE SERPL-SCNC: 102 MMOL/L (ref 98–107)
CLARITY UR: CLEAR
CO2 SERPL-SCNC: 21.9 MMOL/L (ref 22–29)
COLOR UR: YELLOW
CREAT BLD-MCNC: 0.79 MG/DL (ref 0.57–1)
DEPRECATED RDW RBC AUTO: 39.8 FL (ref 37–54)
EOSINOPHIL # BLD AUTO: 0.35 10*3/MM3 (ref 0–0.4)
EOSINOPHIL NFR BLD AUTO: 4.7 % (ref 0.3–6.2)
ERYTHROCYTE [DISTWIDTH] IN BLOOD BY AUTOMATED COUNT: 12.4 % (ref 12.3–15.4)
GFR SERPL CREATININE-BSD FRML MDRD: 77 ML/MIN/1.73
GLOBULIN UR ELPH-MCNC: 2.4 GM/DL
GLUCOSE BLD-MCNC: 119 MG/DL (ref 65–99)
GLUCOSE UR STRIP-MCNC: NEGATIVE MG/DL
HCT VFR BLD AUTO: 42.5 % (ref 34–46.6)
HGB BLD-MCNC: 14.3 G/DL (ref 12–15.9)
HGB UR QL STRIP.AUTO: NEGATIVE
IMM GRANULOCYTES # BLD AUTO: 0.04 10*3/MM3 (ref 0–0.05)
IMM GRANULOCYTES NFR BLD AUTO: 0.5 % (ref 0–0.5)
KETONES UR QL STRIP: NEGATIVE
LEUKOCYTE ESTERASE UR QL STRIP.AUTO: NEGATIVE
LIPASE SERPL-CCNC: 27 U/L (ref 13–60)
LYMPHOCYTES # BLD AUTO: 1.88 10*3/MM3 (ref 0.7–3.1)
LYMPHOCYTES NFR BLD AUTO: 25 % (ref 19.6–45.3)
MCH RBC QN AUTO: 30.2 PG (ref 26.6–33)
MCHC RBC AUTO-ENTMCNC: 33.6 G/DL (ref 31.5–35.7)
MCV RBC AUTO: 89.9 FL (ref 79–97)
MONOCYTES # BLD AUTO: 0.72 10*3/MM3 (ref 0.1–0.9)
MONOCYTES NFR BLD AUTO: 9.6 % (ref 5–12)
NEUTROPHILS # BLD AUTO: 4.47 10*3/MM3 (ref 1.7–7)
NEUTROPHILS NFR BLD AUTO: 59.5 % (ref 42.7–76)
NITRITE UR QL STRIP: NEGATIVE
NRBC BLD AUTO-RTO: 0 /100 WBC (ref 0–0.2)
PH UR STRIP.AUTO: 5.5 [PH] (ref 5–8)
PLATELET # BLD AUTO: 292 10*3/MM3 (ref 140–450)
PMV BLD AUTO: 9.5 FL (ref 6–12)
POTASSIUM BLD-SCNC: 3.9 MMOL/L (ref 3.5–5.2)
PROT SERPL-MCNC: 6.8 G/DL (ref 6–8.5)
PROT UR QL STRIP: NEGATIVE
RBC # BLD AUTO: 4.73 10*6/MM3 (ref 3.77–5.28)
SODIUM BLD-SCNC: 139 MMOL/L (ref 136–145)
SP GR UR STRIP: 1.02 (ref 1–1.03)
UROBILINOGEN UR QL STRIP: NORMAL
WBC NRBC COR # BLD: 7.51 10*3/MM3 (ref 3.4–10.8)

## 2020-02-12 PROCEDURE — 25010000002 KETOROLAC TROMETHAMINE PER 15 MG: Performed by: EMERGENCY MEDICINE

## 2020-02-12 PROCEDURE — 74176 CT ABD & PELVIS W/O CONTRAST: CPT

## 2020-02-12 PROCEDURE — 99283 EMERGENCY DEPT VISIT LOW MDM: CPT

## 2020-02-12 PROCEDURE — 25010000002 ONDANSETRON PER 1 MG: Performed by: EMERGENCY MEDICINE

## 2020-02-12 PROCEDURE — 25010000002 HYDROMORPHONE PER 4 MG: Performed by: EMERGENCY MEDICINE

## 2020-02-12 PROCEDURE — 85025 COMPLETE CBC W/AUTO DIFF WBC: CPT | Performed by: EMERGENCY MEDICINE

## 2020-02-12 PROCEDURE — 83690 ASSAY OF LIPASE: CPT | Performed by: EMERGENCY MEDICINE

## 2020-02-12 PROCEDURE — 81003 URINALYSIS AUTO W/O SCOPE: CPT | Performed by: EMERGENCY MEDICINE

## 2020-02-12 PROCEDURE — 96375 TX/PRO/DX INJ NEW DRUG ADDON: CPT

## 2020-02-12 PROCEDURE — 96374 THER/PROPH/DIAG INJ IV PUSH: CPT

## 2020-02-12 PROCEDURE — 80053 COMPREHEN METABOLIC PANEL: CPT | Performed by: EMERGENCY MEDICINE

## 2020-02-12 PROCEDURE — 96376 TX/PRO/DX INJ SAME DRUG ADON: CPT

## 2020-02-12 RX ORDER — SODIUM CHLORIDE 0.9 % (FLUSH) 0.9 %
10 SYRINGE (ML) INJECTION AS NEEDED
Status: DISCONTINUED | OUTPATIENT
Start: 2020-02-12 | End: 2020-02-12 | Stop reason: HOSPADM

## 2020-02-12 RX ORDER — ONDANSETRON 2 MG/ML
4 INJECTION INTRAMUSCULAR; INTRAVENOUS ONCE
Status: COMPLETED | OUTPATIENT
Start: 2020-02-12 | End: 2020-02-12

## 2020-02-12 RX ORDER — CYCLOBENZAPRINE HCL 10 MG
10 TABLET ORAL 3 TIMES DAILY PRN
Qty: 15 TABLET | Refills: 0 | Status: SHIPPED | OUTPATIENT
Start: 2020-02-12 | End: 2020-04-14

## 2020-02-12 RX ORDER — IBUPROFEN 600 MG/1
600 TABLET ORAL EVERY 6 HOURS PRN
Qty: 20 TABLET | Refills: 0 | Status: SHIPPED | OUTPATIENT
Start: 2020-02-12 | End: 2020-04-14

## 2020-02-12 RX ORDER — KETOROLAC TROMETHAMINE 15 MG/ML
15 INJECTION, SOLUTION INTRAMUSCULAR; INTRAVENOUS ONCE
Status: COMPLETED | OUTPATIENT
Start: 2020-02-12 | End: 2020-02-12

## 2020-02-12 RX ORDER — HYDROMORPHONE HYDROCHLORIDE 1 MG/ML
0.5 INJECTION, SOLUTION INTRAMUSCULAR; INTRAVENOUS; SUBCUTANEOUS ONCE
Status: COMPLETED | OUTPATIENT
Start: 2020-02-12 | End: 2020-02-12

## 2020-02-12 RX ADMIN — HYDROMORPHONE HYDROCHLORIDE 0.5 MG: 1 INJECTION, SOLUTION INTRAMUSCULAR; INTRAVENOUS; SUBCUTANEOUS at 08:20

## 2020-02-12 RX ADMIN — ONDANSETRON 4 MG: 2 INJECTION INTRAMUSCULAR; INTRAVENOUS at 08:20

## 2020-02-12 RX ADMIN — HYDROMORPHONE HYDROCHLORIDE 0.5 MG: 1 INJECTION, SOLUTION INTRAMUSCULAR; INTRAVENOUS; SUBCUTANEOUS at 09:23

## 2020-02-12 RX ADMIN — KETOROLAC TROMETHAMINE 15 MG: 15 INJECTION, SOLUTION INTRAMUSCULAR; INTRAVENOUS at 08:20

## 2020-02-12 RX ADMIN — SODIUM CHLORIDE 1000 ML: 9 INJECTION, SOLUTION INTRAVENOUS at 08:20

## 2020-02-12 NOTE — ED PROVIDER NOTES
EMERGENCY DEPARTMENT ENCOUNTER    CHIEF COMPLAINT  Chief Complaint: back pain/flank pain  History given by: pt  History limited by: none  Room Number: 10/10  PMD: Geovanna Kinney MD      HPI:  Pt is a 50 y.o. female who presents complaining of right sided back pain/flank pain that began two weeks ago, progressively worsening. Pt states the pain does not radiate into her legs. It is unchanged with movement. She denies hematuria or dysuria. Pt reports Hx of kidney stones. She states her pain feels somewhat similar but she normally has urinary Sx with them. She denies any new rash. Pt does not smoke. Hx of cholecystectomy.     PAST MEDICAL HISTORY  Active Ambulatory Problems     Diagnosis Date Noted   • Mixed anxiety depressive disorder 2016   • Hypercholesterolemia 2016   • Hyperglycemia 2016   • Hypothyroidism 2016   • Chronic non-seasonal allergic rhinitis 10/17/2017   • Family history of GI malignancy 2020     Resolved Ambulatory Problems     Diagnosis Date Noted   • Atopic rhinitis 2016     Past Medical History:   Diagnosis Date   • Anxiety    • Depression    • Hormone replacement therapy        PAST SURGICAL HISTORY  Past Surgical History:   Procedure Laterality Date   •  SECTION     • CHOLECYSTECTOMY     • TONSILLECTOMY     • TUBAL ABDOMINAL LIGATION         FAMILY HISTORY  Family History   Problem Relation Age of Onset   • Cancer Mother    • Heart disease Mother    • Heart disease Father    • Diabetes Father    • Heart disease Brother    • No Known Problems Sister    • No Known Problems Daughter    • No Known Problems Son    • No Known Problems Maternal Grandmother    • No Known Problems Paternal Grandmother    • No Known Problems Maternal Aunt    • No Known Problems Paternal Aunt    • BRCA 1/2 Neg Hx    • Breast cancer Neg Hx    • Colon cancer Neg Hx    • Endometrial cancer Neg Hx    • Ovarian cancer Neg Hx        SOCIAL HISTORY  Social History      Socioeconomic History   • Marital status: Single     Spouse name: Not on file   • Number of children: Not on file   • Years of education: Not on file   • Highest education level: Not on file   Tobacco Use   • Smoking status: Never Smoker   • Smokeless tobacco: Never Used   Substance and Sexual Activity   • Alcohol use: Yes   • Drug use: No       ALLERGIES  Patient has no known allergies.    REVIEW OF SYSTEMS  Review of Systems   Constitutional: Negative for fever.   HENT: Negative for sore throat.    Eyes: Negative.    Respiratory: Negative for cough and shortness of breath.    Cardiovascular: Negative for chest pain.   Gastrointestinal: Negative for abdominal pain, diarrhea and vomiting.   Genitourinary: Positive for flank pain (right). Negative for dysuria and hematuria.   Musculoskeletal: Positive for back pain (right sided). Negative for neck pain.   Skin: Negative for rash.   Allergic/Immunologic: Negative.    Neurological: Negative for weakness, numbness and headaches.   Hematological: Negative.    Psychiatric/Behavioral: Negative.    All other systems reviewed and are negative.      PHYSICAL EXAM  ED Triage Vitals [02/12/20 0759]   Temp Heart Rate Resp BP SpO2   97.7 °F (36.5 °C) 85 16 -- 99 %      Temp src Heart Rate Source Patient Position BP Location FiO2 (%)   Tympanic -- -- -- --       Physical Exam   Constitutional: She is oriented to person, place, and time. No distress.   HENT:   Head: Normocephalic and atraumatic.   Eyes: Pupils are equal, round, and reactive to light. EOM are normal.   Neck: Normal range of motion. Neck supple.   Cardiovascular: Normal rate, regular rhythm and normal heart sounds.   Pulmonary/Chest: Effort normal and breath sounds normal. No respiratory distress.   Abdominal: Soft. There is tenderness (mild RUQ). There is no rebound and no guarding.   Mild right flank tenderness.    Musculoskeletal: Normal range of motion. She exhibits no edema.   Neurological: She is alert and  oriented to person, place, and time. She has normal sensation and normal strength.   Skin: Skin is warm and dry. No rash noted.   Psychiatric: Mood and affect normal.   Nursing note and vitals reviewed.      LAB RESULTS  Lab Results (last 24 hours)     Procedure Component Value Units Date/Time    CBC & Differential [[id number]] Collected:  02/12/20 0816    Specimen:  Blood Updated:  02/12/20 0831    Narrative:       The following orders were created for panel order CBC & Differential.  Procedure                               Abnormality         Status                     ---------                               -----------         ------                     CBC Auto Differential[[id number]]        Normal              Final result                 Please view results for these tests on the individual orders.    CBC Auto Differential [[id number]]  (Normal) Collected:  02/12/20 0816    Specimen:  Blood Updated:  02/12/20 0831     WBC 7.51 10*3/mm3      RBC 4.73 10*6/mm3      Hemoglobin 14.3 g/dL      Hematocrit 42.5 %      MCV 89.9 fL      MCH 30.2 pg      MCHC 33.6 g/dL      RDW 12.4 %      RDW-SD 39.8 fl      MPV 9.5 fL      Platelets 292 10*3/mm3      Neutrophil % 59.5 %      Lymphocyte % 25.0 %      Monocyte % 9.6 %      Eosinophil % 4.7 %      Basophil % 0.7 %      Immature Grans % 0.5 %      Neutrophils, Absolute 4.47 10*3/mm3      Lymphocytes, Absolute 1.88 10*3/mm3      Monocytes, Absolute 0.72 10*3/mm3      Eosinophils, Absolute 0.35 10*3/mm3      Basophils, Absolute 0.05 10*3/mm3      Immature Grans, Absolute 0.04 10*3/mm3      nRBC 0.0 /100 WBC     Comprehensive Metabolic Panel [[id number]]  (Abnormal) Collected:  02/12/20 0817    Specimen:  Blood Updated:  02/12/20 0856     Glucose 119 mg/dL      BUN 16 mg/dL      Creatinine 0.79 mg/dL      Sodium 139 mmol/L      Potassium 3.9 mmol/L      Chloride 102 mmol/L      CO2 21.9 mmol/L      Calcium 9.4 mg/dL      Total Protein 6.8 g/dL      Albumin 4.40 g/dL      ALT  (SGPT) 39 U/L      AST (SGOT) 29 U/L      Alkaline Phosphatase 82 U/L      Total Bilirubin 0.6 mg/dL      eGFR Non African Amer 77 mL/min/1.73      Globulin 2.4 gm/dL      A/G Ratio 1.8 g/dL      BUN/Creatinine Ratio 20.3     Anion Gap 15.1 mmol/L     Narrative:       GFR Normal >60  Chronic Kidney Disease <60  Kidney Failure <15      Lipase [[id number]]  (Normal) Collected:  02/12/20 0817    Specimen:  Blood Updated:  02/12/20 0856     Lipase 27 U/L     Urinalysis With Microscopic If Indicated (No Culture) - Urine, Clean Catch [[id number]]  (Normal) Collected:  02/12/20 0917    Specimen:  Urine, Clean Catch Updated:  02/12/20 0930     Color, UA Yellow     Appearance, UA Clear     pH, UA 5.5     Specific Gravity, UA 1.018     Glucose, UA Negative     Ketones, UA Negative     Bilirubin, UA Negative     Blood, UA Negative     Protein, UA Negative     Leuk Esterase, UA Negative     Nitrite, UA Negative     Urobilinogen, UA 0.2 E.U./dL    Narrative:       Urine microscopic not indicated.          I ordered the above labs and reviewed the results    RADIOLOGY  CT Abdomen Pelvis Without Contrast   Preliminary Result   1.  Hepatic steatosis.   2.  Bilateral nephrolithiasis without hydronephrosis.   3.  Small hiatal hernia.   4.  Other findings as above.       The above findings were discussed with Dr. Sewell by telephone by   Milton Ramos at 9:25 AM on 02/11/2020.                       I ordered the above noted radiological studies. Interpreted by radiologist. Discussed with radiologist (). Reviewed by me in PACS.       PROCEDURES  Procedures      PROGRESS AND CONSULTS  ED Course as of Feb 12 1013 Wed Feb 12, 2020   1007 10:07 AM  Patient here for right flank pain.  Urine is clear and CT shows no evidence of stone, AAA.  Patient feeling better.  Will discharge home.  NSAIDS, muscle relaxer.  Has appointment with PMD.    [SL]      ED Course User Index  [SL] Geoffrey Sewell MD       6163 CT a/p shows fatty liver. No  ureteral stones.     1006 Rechecked pt. Pt is resting comfortably in NAD. Notified pt of unremarkable blood work, negative UA, and CT a/p results that shows a fatty liver but no ureterolithiasis. Discussed the plan to discharge the pt home. I instructed the pt to f/u with her PCP. Pt understands and agrees with the plan, all questions answered.      MEDICAL DECISION MAKING  Results were reviewed/discussed with the patient and they were also made aware of online access. Pt also made aware that some labs, such as cultures, will not be resulted during ER visit and follow up with PMD is necessary.     MDM       DIAGNOSIS  Final diagnoses:   Right flank pain       DISPOSITION  DISCHARGE    Patient discharged in stable condition.    Reviewed implications of results, diagnosis, meds, responsibility to follow up, warning signs and symptoms of possible worsening, potential complications and reasons to return to ER.    Patient/Family voiced understanding of above instructions.    Discussed plan for discharge, as there is no emergent indication for admission. Patient referred to primary care provider for BP management due to today's BP. Pt/family is agreeable and understands need for follow up and repeat testing.  Pt is aware that discharge does not mean that nothing is wrong but it indicates no emergency is present that requires admission and they must continue care with follow-up as given below or physician of their choice.     FOLLOW-UP  Geovanna Kinney MD  [address]  [address] [place] [id number] [phone]    Schedule an appointment as soon as possible for a visit            Medication List      New Prescriptions    cyclobenzaprine 10 MG tablet  Commonly known as:  FLEXERIL  Take 1 tablet by mouth 3 (Three) Times a Day As Needed for Muscle Spasms.     ibuprofen 600 MG tablet  Commonly known as:  ADVIL,MOTRIN  Take 1 tablet by mouth Every 6 (Six) Hours As Needed for Moderate Pain .              Latest  Documented Vital Signs:  As of 10:13 AM  BP- 128/84 HR- 88 Temp- 97.7 °F (36.5 °C) (Tympanic) O2 sat- 100%    --  Documentation assistance provided by aissatou Arzate for Dr. Sewell.  Information recorded by the scribe was done at my direction and has been verified and validated by me.                 Himanshu Arzate  02/12/20 1037       Geoffrey Sewell MD  02/12/20 1134

## 2020-02-20 ENCOUNTER — APPOINTMENT (OUTPATIENT)
Dept: GENERAL RADIOLOGY | Facility: HOSPITAL | Age: 50
End: 2020-02-20

## 2020-02-20 ENCOUNTER — HOSPITAL ENCOUNTER (OUTPATIENT)
Facility: HOSPITAL | Age: 50
Discharge: HOME | End: 2020-02-24
Attending: EMERGENCY MEDICINE | Admitting: ANESTHESIOLOGY

## 2020-02-20 ENCOUNTER — APPOINTMENT (OUTPATIENT)
Dept: MRI IMAGING | Facility: HOSPITAL | Age: 50
End: 2020-02-20

## 2020-02-20 DIAGNOSIS — M54.41 ACUTE RIGHT-SIDED LOW BACK PAIN WITH RIGHT-SIDED SCIATICA: Primary | ICD-10-CM

## 2020-02-20 DIAGNOSIS — M62.3 IMMOBILITY SYNDROME: ICD-10-CM

## 2020-02-20 DIAGNOSIS — R52 PAIN: ICD-10-CM

## 2020-02-20 LAB
ALBUMIN SERPL-MCNC: 4.8 G/DL (ref 3.5–5.2)
ALBUMIN/GLOB SERPL: 2.3 G/DL
ALP SERPL-CCNC: 85 U/L (ref 39–117)
ALT SERPL W P-5'-P-CCNC: 65 U/L (ref 1–33)
ANION GAP SERPL CALCULATED.3IONS-SCNC: 18.6 MMOL/L (ref 5–15)
AST SERPL-CCNC: 62 U/L (ref 1–32)
BILIRUB SERPL-MCNC: 0.7 MG/DL (ref 0.2–1.2)
BUN BLD-MCNC: 21 MG/DL (ref 6–20)
BUN/CREAT SERPL: 26.3 (ref 7–25)
CALCIUM SPEC-SCNC: 9.6 MG/DL (ref 8.6–10.5)
CHLORIDE SERPL-SCNC: 96 MMOL/L (ref 98–107)
CO2 SERPL-SCNC: 21.4 MMOL/L (ref 22–29)
CREAT BLD-MCNC: 0.8 MG/DL (ref 0.57–1)
DEPRECATED RDW RBC AUTO: 42.2 FL (ref 37–54)
ERYTHROCYTE [DISTWIDTH] IN BLOOD BY AUTOMATED COUNT: 12.4 % (ref 12.3–15.4)
GFR SERPL CREATININE-BSD FRML MDRD: 76 ML/MIN/1.73
GLOBULIN UR ELPH-MCNC: 2.1 GM/DL
GLUCOSE BLD-MCNC: 232 MG/DL (ref 65–99)
HCT VFR BLD AUTO: 42.9 % (ref 34–46.6)
HGB BLD-MCNC: 14.3 G/DL (ref 12–15.9)
MCH RBC QN AUTO: 30.8 PG (ref 26.6–33)
MCHC RBC AUTO-ENTMCNC: 33.3 G/DL (ref 31.5–35.7)
MCV RBC AUTO: 92.3 FL (ref 79–97)
PLATELET # BLD AUTO: 244 10*3/MM3 (ref 140–450)
PMV BLD AUTO: 9.6 FL (ref 6–12)
POTASSIUM BLD-SCNC: 4.1 MMOL/L (ref 3.5–5.2)
PROT SERPL-MCNC: 6.9 G/DL (ref 6–8.5)
RBC # BLD AUTO: 4.65 10*6/MM3 (ref 3.77–5.28)
SODIUM BLD-SCNC: 136 MMOL/L (ref 136–145)
WBC NRBC COR # BLD: 7.02 10*3/MM3 (ref 3.4–10.8)

## 2020-02-20 PROCEDURE — 25010000002 KETOROLAC TROMETHAMINE PER 15 MG: Performed by: EMERGENCY MEDICINE

## 2020-02-20 PROCEDURE — 25010000002 HYDROMORPHONE 1 MG/ML SOLUTION: Performed by: EMERGENCY MEDICINE

## 2020-02-20 PROCEDURE — 96374 THER/PROPH/DIAG INJ IV PUSH: CPT

## 2020-02-20 PROCEDURE — G0378 HOSPITAL OBSERVATION PER HR: HCPCS

## 2020-02-20 PROCEDURE — 25010000002 ONDANSETRON PER 1 MG: Performed by: EMERGENCY MEDICINE

## 2020-02-20 PROCEDURE — 25010000002 PROMETHAZINE PER 50 MG: Performed by: EMERGENCY MEDICINE

## 2020-02-20 PROCEDURE — 85027 COMPLETE CBC AUTOMATED: CPT | Performed by: INTERNAL MEDICINE

## 2020-02-20 PROCEDURE — 99284 EMERGENCY DEPT VISIT MOD MDM: CPT

## 2020-02-20 PROCEDURE — 25010000002 DEXAMETHASONE PER 1 MG: Performed by: INTERNAL MEDICINE

## 2020-02-20 PROCEDURE — 96375 TX/PRO/DX INJ NEW DRUG ADDON: CPT

## 2020-02-20 PROCEDURE — 96372 THER/PROPH/DIAG INJ SC/IM: CPT

## 2020-02-20 PROCEDURE — 25010000002 MORPHINE PER 10 MG: Performed by: EMERGENCY MEDICINE

## 2020-02-20 PROCEDURE — 72148 MRI LUMBAR SPINE W/O DYE: CPT

## 2020-02-20 PROCEDURE — 72110 X-RAY EXAM L-2 SPINE 4/>VWS: CPT

## 2020-02-20 PROCEDURE — 80053 COMPREHEN METABOLIC PANEL: CPT | Performed by: INTERNAL MEDICINE

## 2020-02-20 PROCEDURE — 96376 TX/PRO/DX INJ SAME DRUG ADON: CPT

## 2020-02-20 PROCEDURE — 25010000002 DEXAMETHASONE PER 1 MG: Performed by: EMERGENCY MEDICINE

## 2020-02-20 RX ORDER — PROMETHAZINE HYDROCHLORIDE 25 MG/ML
25 INJECTION, SOLUTION INTRAMUSCULAR; INTRAVENOUS ONCE
Status: COMPLETED | OUTPATIENT
Start: 2020-02-20 | End: 2020-02-20

## 2020-02-20 RX ORDER — ONDANSETRON 2 MG/ML
4 INJECTION INTRAMUSCULAR; INTRAVENOUS ONCE
Status: COMPLETED | OUTPATIENT
Start: 2020-02-20 | End: 2020-02-20

## 2020-02-20 RX ORDER — MORPHINE SULFATE 2 MG/ML
4 INJECTION, SOLUTION INTRAMUSCULAR; INTRAVENOUS ONCE
Status: COMPLETED | OUTPATIENT
Start: 2020-02-20 | End: 2020-02-20

## 2020-02-20 RX ORDER — AZELASTINE 1 MG/ML
2 SPRAY, METERED NASAL DAILY
Status: DISCONTINUED | OUTPATIENT
Start: 2020-02-20 | End: 2020-02-24 | Stop reason: HOSPADM

## 2020-02-20 RX ORDER — HYDROCODONE BITARTRATE AND ACETAMINOPHEN 5; 325 MG/1; MG/1
1 TABLET ORAL EVERY 6 HOURS PRN
Status: DISCONTINUED | OUTPATIENT
Start: 2020-02-20 | End: 2020-02-23

## 2020-02-20 RX ORDER — NAPROXEN 250 MG/1
250 TABLET ORAL 2 TIMES DAILY WITH MEALS
Status: DISCONTINUED | OUTPATIENT
Start: 2020-02-20 | End: 2020-02-22

## 2020-02-20 RX ORDER — FLUTICASONE PROPIONATE 50 MCG
2 SPRAY, SUSPENSION (ML) NASAL DAILY
Status: DISCONTINUED | OUTPATIENT
Start: 2020-02-20 | End: 2020-02-24 | Stop reason: HOSPADM

## 2020-02-20 RX ORDER — DEXAMETHASONE SODIUM PHOSPHATE 4 MG/ML
8 INJECTION, SOLUTION INTRA-ARTICULAR; INTRALESIONAL; INTRAMUSCULAR; INTRAVENOUS; SOFT TISSUE ONCE
Status: COMPLETED | OUTPATIENT
Start: 2020-02-20 | End: 2020-02-20

## 2020-02-20 RX ORDER — LEVOTHYROXINE SODIUM 112 UG/1
112 TABLET ORAL
Status: DISCONTINUED | OUTPATIENT
Start: 2020-02-21 | End: 2020-02-24 | Stop reason: HOSPADM

## 2020-02-20 RX ORDER — KETOROLAC TROMETHAMINE 30 MG/ML
60 INJECTION, SOLUTION INTRAMUSCULAR; INTRAVENOUS ONCE
Status: COMPLETED | OUTPATIENT
Start: 2020-02-20 | End: 2020-02-20

## 2020-02-20 RX ORDER — BACLOFEN 10 MG/1
10 TABLET ORAL ONCE
Status: COMPLETED | OUTPATIENT
Start: 2020-02-20 | End: 2020-02-20

## 2020-02-20 RX ORDER — PANTOPRAZOLE SODIUM 20 MG/1
20 TABLET, DELAYED RELEASE ORAL
Status: DISCONTINUED | OUTPATIENT
Start: 2020-02-21 | End: 2020-02-20

## 2020-02-20 RX ORDER — PANTOPRAZOLE SODIUM 40 MG/1
40 TABLET, DELAYED RELEASE ORAL
Status: DISCONTINUED | OUTPATIENT
Start: 2020-02-21 | End: 2020-02-24 | Stop reason: HOSPADM

## 2020-02-20 RX ORDER — LORAZEPAM 1 MG/1
1 TABLET ORAL ONCE
Status: COMPLETED | OUTPATIENT
Start: 2020-02-20 | End: 2020-02-20

## 2020-02-20 RX ORDER — ESTRADIOL 1 MG/1
1 TABLET ORAL DAILY
Status: DISCONTINUED | OUTPATIENT
Start: 2020-02-20 | End: 2020-02-24 | Stop reason: HOSPADM

## 2020-02-20 RX ORDER — DEXAMETHASONE SODIUM PHOSPHATE 4 MG/ML
4 INJECTION, SOLUTION INTRA-ARTICULAR; INTRALESIONAL; INTRAMUSCULAR; INTRAVENOUS; SOFT TISSUE EVERY 6 HOURS
Status: DISCONTINUED | OUTPATIENT
Start: 2020-02-20 | End: 2020-02-23

## 2020-02-20 RX ORDER — CYCLOBENZAPRINE HCL 10 MG
10 TABLET ORAL 3 TIMES DAILY PRN
Status: DISCONTINUED | OUTPATIENT
Start: 2020-02-20 | End: 2020-02-24 | Stop reason: HOSPADM

## 2020-02-20 RX ADMIN — NAPROXEN 250 MG: 250 TABLET ORAL at 17:59

## 2020-02-20 RX ADMIN — BACLOFEN 10 MG: 10 TABLET ORAL at 11:01

## 2020-02-20 RX ADMIN — ESTRADIOL 1 MG: 1 TABLET ORAL at 20:53

## 2020-02-20 RX ADMIN — DEXAMETHASONE SODIUM PHOSPHATE 4 MG: 4 INJECTION, SOLUTION INTRA-ARTICULAR; INTRALESIONAL; INTRAMUSCULAR; INTRAVENOUS; SOFT TISSUE at 17:54

## 2020-02-20 RX ADMIN — HYDROCODONE BITARTRATE AND ACETAMINOPHEN 1 TABLET: 5; 325 TABLET ORAL at 20:53

## 2020-02-20 RX ADMIN — LORAZEPAM 1 MG: 1 TABLET ORAL at 19:49

## 2020-02-20 RX ADMIN — DEXAMETHASONE SODIUM PHOSPHATE 4 MG: 4 INJECTION, SOLUTION INTRA-ARTICULAR; INTRALESIONAL; INTRAMUSCULAR; INTRAVENOUS; SOFT TISSUE at 23:18

## 2020-02-20 RX ADMIN — KETOROLAC TROMETHAMINE 60 MG: 30 INJECTION, SOLUTION INTRAMUSCULAR at 11:01

## 2020-02-20 RX ADMIN — CYCLOBENZAPRINE 10 MG: 10 TABLET, FILM COATED ORAL at 16:24

## 2020-02-20 RX ADMIN — HYDROMORPHONE HYDROCHLORIDE 2 MG: 1 INJECTION, SOLUTION INTRAMUSCULAR; INTRAVENOUS; SUBCUTANEOUS at 09:48

## 2020-02-20 RX ADMIN — DEXAMETHASONE SODIUM PHOSPHATE 8 MG: 4 INJECTION, SOLUTION INTRA-ARTICULAR; INTRALESIONAL; INTRAMUSCULAR; INTRAVENOUS; SOFT TISSUE at 12:43

## 2020-02-20 RX ADMIN — SERTRALINE 150 MG: 100 TABLET, FILM COATED ORAL at 20:52

## 2020-02-20 RX ADMIN — PROMETHAZINE HYDROCHLORIDE 25 MG: 25 INJECTION INTRAMUSCULAR; INTRAVENOUS at 09:48

## 2020-02-20 RX ADMIN — MORPHINE SULFATE 4 MG: 2 INJECTION, SOLUTION INTRAMUSCULAR; INTRAVENOUS at 13:27

## 2020-02-20 RX ADMIN — ONDANSETRON 4 MG: 2 INJECTION INTRAMUSCULAR; INTRAVENOUS at 13:27

## 2020-02-20 RX ADMIN — HYDROCODONE BITARTRATE AND ACETAMINOPHEN 1 TABLET: 5; 325 TABLET ORAL at 16:24

## 2020-02-21 ENCOUNTER — APPOINTMENT (OUTPATIENT)
Dept: GENERAL RADIOLOGY | Facility: HOSPITAL | Age: 50
End: 2020-02-21

## 2020-02-21 ENCOUNTER — ANESTHESIA EVENT (OUTPATIENT)
Dept: PAIN MEDICINE | Facility: HOSPITAL | Age: 50
End: 2020-02-21

## 2020-02-21 ENCOUNTER — APPOINTMENT (OUTPATIENT)
Dept: PAIN MEDICINE | Facility: HOSPITAL | Age: 50
End: 2020-02-21

## 2020-02-21 ENCOUNTER — ANESTHESIA (OUTPATIENT)
Dept: PAIN MEDICINE | Facility: HOSPITAL | Age: 50
End: 2020-02-21

## 2020-02-21 LAB
ANION GAP SERPL CALCULATED.3IONS-SCNC: 14.1 MMOL/L (ref 5–15)
BASOPHILS # BLD AUTO: 0.01 10*3/MM3 (ref 0–0.2)
BASOPHILS NFR BLD AUTO: 0.1 % (ref 0–1.5)
BUN BLD-MCNC: 23 MG/DL (ref 6–20)
BUN/CREAT SERPL: 27.4 (ref 7–25)
CALCIUM SPEC-SCNC: 9.7 MG/DL (ref 8.6–10.5)
CHLORIDE SERPL-SCNC: 99 MMOL/L (ref 98–107)
CO2 SERPL-SCNC: 22.9 MMOL/L (ref 22–29)
CREAT BLD-MCNC: 0.84 MG/DL (ref 0.57–1)
DEPRECATED RDW RBC AUTO: 43.2 FL (ref 37–54)
EOSINOPHIL # BLD AUTO: 0 10*3/MM3 (ref 0–0.4)
EOSINOPHIL NFR BLD AUTO: 0 % (ref 0.3–6.2)
ERYTHROCYTE [DISTWIDTH] IN BLOOD BY AUTOMATED COUNT: 12.3 % (ref 12.3–15.4)
GFR SERPL CREATININE-BSD FRML MDRD: 72 ML/MIN/1.73
GLUCOSE BLD-MCNC: 221 MG/DL (ref 65–99)
GLUCOSE BLDC GLUCOMTR-MCNC: 132 MG/DL (ref 70–130)
GLUCOSE BLDC GLUCOMTR-MCNC: 168 MG/DL (ref 70–130)
GLUCOSE BLDC GLUCOMTR-MCNC: 184 MG/DL (ref 70–130)
HCT VFR BLD AUTO: 41.4 % (ref 34–46.6)
HGB BLD-MCNC: 13.8 G/DL (ref 12–15.9)
IMM GRANULOCYTES # BLD AUTO: 0.08 10*3/MM3 (ref 0–0.05)
IMM GRANULOCYTES NFR BLD AUTO: 0.8 % (ref 0–0.5)
LYMPHOCYTES # BLD AUTO: 0.91 10*3/MM3 (ref 0.7–3.1)
LYMPHOCYTES NFR BLD AUTO: 8.5 % (ref 19.6–45.3)
MCH RBC QN AUTO: 31.1 PG (ref 26.6–33)
MCHC RBC AUTO-ENTMCNC: 33.3 G/DL (ref 31.5–35.7)
MCV RBC AUTO: 93.2 FL (ref 79–97)
MONOCYTES # BLD AUTO: 0.24 10*3/MM3 (ref 0.1–0.9)
MONOCYTES NFR BLD AUTO: 2.3 % (ref 5–12)
NEUTROPHILS # BLD AUTO: 9.42 10*3/MM3 (ref 1.7–7)
NEUTROPHILS NFR BLD AUTO: 88.3 % (ref 42.7–76)
NRBC BLD AUTO-RTO: 0 /100 WBC (ref 0–0.2)
PLATELET # BLD AUTO: 275 10*3/MM3 (ref 140–450)
PMV BLD AUTO: 9.5 FL (ref 6–12)
POTASSIUM BLD-SCNC: 4.3 MMOL/L (ref 3.5–5.2)
RBC # BLD AUTO: 4.44 10*6/MM3 (ref 3.77–5.28)
SODIUM BLD-SCNC: 136 MMOL/L (ref 136–145)
WBC NRBC COR # BLD: 10.66 10*3/MM3 (ref 3.4–10.8)

## 2020-02-21 PROCEDURE — 25010000002 FENTANYL CITRATE (PF) 100 MCG/2ML SOLUTION: Performed by: ANESTHESIOLOGY

## 2020-02-21 PROCEDURE — 25010000002 METHYLPREDNISOLONE PER 80 MG: Performed by: ANESTHESIOLOGY

## 2020-02-21 PROCEDURE — 63710000001 INSULIN LISPRO (HUMAN) PER 5 UNITS: Performed by: INTERNAL MEDICINE

## 2020-02-21 PROCEDURE — 25010000002 MIDAZOLAM PER 1 MG: Performed by: ANESTHESIOLOGY

## 2020-02-21 PROCEDURE — 99214 OFFICE O/P EST MOD 30 MIN: CPT | Performed by: PHYSICIAN ASSISTANT

## 2020-02-21 PROCEDURE — 25010000002 DEXAMETHASONE PER 1 MG: Performed by: INTERNAL MEDICINE

## 2020-02-21 PROCEDURE — 96376 TX/PRO/DX INJ SAME DRUG ADON: CPT

## 2020-02-21 PROCEDURE — 82962 GLUCOSE BLOOD TEST: CPT

## 2020-02-21 PROCEDURE — 80048 BASIC METABOLIC PNL TOTAL CA: CPT | Performed by: INTERNAL MEDICINE

## 2020-02-21 PROCEDURE — 77003 FLUOROGUIDE FOR SPINE INJECT: CPT

## 2020-02-21 PROCEDURE — 85025 COMPLETE CBC W/AUTO DIFF WBC: CPT | Performed by: INTERNAL MEDICINE

## 2020-02-21 PROCEDURE — G0378 HOSPITAL OBSERVATION PER HR: HCPCS

## 2020-02-21 PROCEDURE — C1755 CATHETER, INTRASPINAL: HCPCS

## 2020-02-21 RX ORDER — NICOTINE POLACRILEX 4 MG
15 LOZENGE BUCCAL
Status: DISCONTINUED | OUTPATIENT
Start: 2020-02-21 | End: 2020-02-24 | Stop reason: HOSPADM

## 2020-02-21 RX ORDER — FENTANYL CITRATE 50 UG/ML
50 INJECTION, SOLUTION INTRAMUSCULAR; INTRAVENOUS AS NEEDED
Status: DISCONTINUED | OUTPATIENT
Start: 2020-02-21 | End: 2020-02-22

## 2020-02-21 RX ORDER — MIDAZOLAM HYDROCHLORIDE 1 MG/ML
1 INJECTION INTRAMUSCULAR; INTRAVENOUS AS NEEDED
Status: DISCONTINUED | OUTPATIENT
Start: 2020-02-21 | End: 2020-02-22

## 2020-02-21 RX ORDER — METHYLPREDNISOLONE ACETATE 80 MG/ML
80 INJECTION, SUSPENSION INTRA-ARTICULAR; INTRALESIONAL; INTRAMUSCULAR; SOFT TISSUE ONCE
Status: DISCONTINUED | OUTPATIENT
Start: 2020-02-21 | End: 2020-02-22

## 2020-02-21 RX ORDER — LIDOCAINE HYDROCHLORIDE 10 MG/ML
1 INJECTION, SOLUTION INFILTRATION; PERINEURAL ONCE AS NEEDED
Status: DISCONTINUED | OUTPATIENT
Start: 2020-02-21 | End: 2020-02-22

## 2020-02-21 RX ORDER — LIDOCAINE 50 MG/G
1 PATCH TOPICAL
Status: DISCONTINUED | OUTPATIENT
Start: 2020-02-21 | End: 2020-02-23

## 2020-02-21 RX ORDER — SODIUM CHLORIDE 0.9 % (FLUSH) 0.9 %
1-10 SYRINGE (ML) INJECTION AS NEEDED
Status: DISCONTINUED | OUTPATIENT
Start: 2020-02-21 | End: 2020-02-24 | Stop reason: HOSPADM

## 2020-02-21 RX ORDER — METHYLPREDNISOLONE ACETATE 80 MG/ML
80 INJECTION, SUSPENSION INTRA-ARTICULAR; INTRALESIONAL; INTRAMUSCULAR; SOFT TISSUE ONCE
Status: COMPLETED | OUTPATIENT
Start: 2020-02-21 | End: 2020-02-21

## 2020-02-21 RX ORDER — DEXTROSE MONOHYDRATE 25 G/50ML
25 INJECTION, SOLUTION INTRAVENOUS
Status: DISCONTINUED | OUTPATIENT
Start: 2020-02-21 | End: 2020-02-24 | Stop reason: HOSPADM

## 2020-02-21 RX ADMIN — LIDOCAINE 1 PATCH: 50 PATCH CUTANEOUS at 08:47

## 2020-02-21 RX ADMIN — INSULIN LISPRO 2 UNITS: 100 INJECTION, SOLUTION INTRAVENOUS; SUBCUTANEOUS at 08:48

## 2020-02-21 RX ADMIN — CYCLOBENZAPRINE 10 MG: 10 TABLET, FILM COATED ORAL at 21:17

## 2020-02-21 RX ADMIN — SERTRALINE 150 MG: 100 TABLET, FILM COATED ORAL at 23:05

## 2020-02-21 RX ADMIN — METHYLPREDNISOLONE ACETATE 80 MG: 80 INJECTION, SUSPENSION INTRA-ARTICULAR; INTRALESIONAL; INTRAMUSCULAR; SOFT TISSUE at 12:40

## 2020-02-21 RX ADMIN — HYDROCODONE BITARTRATE AND ACETAMINOPHEN 1 TABLET: 5; 325 TABLET ORAL at 23:10

## 2020-02-21 RX ADMIN — DEXAMETHASONE SODIUM PHOSPHATE 4 MG: 4 INJECTION, SOLUTION INTRA-ARTICULAR; INTRALESIONAL; INTRAMUSCULAR; INTRAVENOUS; SOFT TISSUE at 06:58

## 2020-02-21 RX ADMIN — NAPROXEN 250 MG: 250 TABLET ORAL at 17:44

## 2020-02-21 RX ADMIN — HYDROCODONE BITARTRATE AND ACETAMINOPHEN 1 TABLET: 5; 325 TABLET ORAL at 17:44

## 2020-02-21 RX ADMIN — DEXAMETHASONE SODIUM PHOSPHATE 4 MG: 4 INJECTION, SOLUTION INTRA-ARTICULAR; INTRALESIONAL; INTRAMUSCULAR; INTRAVENOUS; SOFT TISSUE at 17:44

## 2020-02-21 RX ADMIN — PANTOPRAZOLE SODIUM 40 MG: 40 TABLET, DELAYED RELEASE ORAL at 06:58

## 2020-02-21 RX ADMIN — INSULIN LISPRO 2 UNITS: 100 INJECTION, SOLUTION INTRAVENOUS; SUBCUTANEOUS at 21:17

## 2020-02-21 RX ADMIN — ESTRADIOL 1 MG: 1 TABLET ORAL at 23:05

## 2020-02-21 RX ADMIN — HYDROCODONE BITARTRATE AND ACETAMINOPHEN 1 TABLET: 5; 325 TABLET ORAL at 08:47

## 2020-02-21 RX ADMIN — DEXAMETHASONE SODIUM PHOSPHATE 4 MG: 4 INJECTION, SOLUTION INTRA-ARTICULAR; INTRALESIONAL; INTRAMUSCULAR; INTRAVENOUS; SOFT TISSUE at 11:07

## 2020-02-21 RX ADMIN — INSULIN LISPRO 2 UNITS: 100 INJECTION, SOLUTION INTRAVENOUS; SUBCUTANEOUS at 17:44

## 2020-02-21 RX ADMIN — NAPROXEN 250 MG: 250 TABLET ORAL at 08:47

## 2020-02-21 RX ADMIN — DEXAMETHASONE SODIUM PHOSPHATE 4 MG: 4 INJECTION, SOLUTION INTRA-ARTICULAR; INTRALESIONAL; INTRAMUSCULAR; INTRAVENOUS; SOFT TISSUE at 23:05

## 2020-02-21 RX ADMIN — LEVOTHYROXINE SODIUM 112 MCG: 0.11 TABLET ORAL at 06:57

## 2020-02-21 RX ADMIN — FENTANYL CITRATE 50 MCG: 50 INJECTION, SOLUTION INTRAMUSCULAR; INTRAVENOUS at 12:37

## 2020-02-21 RX ADMIN — MIDAZOLAM 1 MG: 1 INJECTION INTRAMUSCULAR; INTRAVENOUS at 12:37

## 2020-02-21 NOTE — ANESTHESIA PROCEDURE NOTES
PAIN Epidural block    Pre-sedation assessment completed: 2/21/2020 12:23 PM    Patient reassessed immediately prior to procedure    Patient location during procedure: pain clinic  Start Time: 2/21/2020 12:32 PM  Stop Time: 2/21/2020 12:41 PM  Indication:procedure for pain  Performed By  Anesthesiologist: Deanna Ruiz MD  Preanesthetic Checklist  Completed: patient identified, site marked, surgical consent, pre-op evaluation, timeout performed, IV checked, risks and benefits discussed and monitors and equipment checked  Additional Notes  Fluoro used.    Lumbar disc herniation w/ radiculopathy.    Prep:  Pt Position:prone  Sterile Tech:cap, gloves and sterile barrier  Prep:chlorhexidine gluconate and isopropyl alcohol  Monitoring:EKG, continuous pulse oximetry and blood pressure monitoring  Procedure:Sedation: yes     Approach:right paramedian  Guidance: fluoroscopy  Location:lumbar  Level:3-4  Needle Type:Tuohy  Needle Gauge:20  Aspiration:negative  Medications:  Depomedrol:80  Preservative Free Saline:3mL    Post Assessment:  Dressing:occlusive dressing applied  Pt Tolerance:patient tolerated the procedure well with no apparent complications  Complications:no

## 2020-02-22 LAB
GLUCOSE BLDC GLUCOMTR-MCNC: 119 MG/DL (ref 70–130)
GLUCOSE BLDC GLUCOMTR-MCNC: 134 MG/DL (ref 70–130)
GLUCOSE BLDC GLUCOMTR-MCNC: 145 MG/DL (ref 70–130)
GLUCOSE BLDC GLUCOMTR-MCNC: 195 MG/DL (ref 70–130)
INR PPP: 1.01 (ref 0.9–1.1)
PROTHROMBIN TIME: 13 SECONDS (ref 11.7–14.2)

## 2020-02-22 PROCEDURE — 99213 OFFICE O/P EST LOW 20 MIN: CPT | Performed by: PHYSICIAN ASSISTANT

## 2020-02-22 PROCEDURE — 96376 TX/PRO/DX INJ SAME DRUG ADON: CPT

## 2020-02-22 PROCEDURE — G0378 HOSPITAL OBSERVATION PER HR: HCPCS

## 2020-02-22 PROCEDURE — 85610 PROTHROMBIN TIME: CPT | Performed by: PHYSICIAN ASSISTANT

## 2020-02-22 PROCEDURE — 82962 GLUCOSE BLOOD TEST: CPT

## 2020-02-22 PROCEDURE — 25010000002 DEXAMETHASONE PER 1 MG: Performed by: INTERNAL MEDICINE

## 2020-02-22 PROCEDURE — 63710000001 INSULIN LISPRO (HUMAN) PER 5 UNITS: Performed by: INTERNAL MEDICINE

## 2020-02-22 RX ADMIN — DEXAMETHASONE SODIUM PHOSPHATE 4 MG: 4 INJECTION, SOLUTION INTRA-ARTICULAR; INTRALESIONAL; INTRAMUSCULAR; INTRAVENOUS; SOFT TISSUE at 11:34

## 2020-02-22 RX ADMIN — LEVOTHYROXINE SODIUM 112 MCG: 0.11 TABLET ORAL at 06:51

## 2020-02-22 RX ADMIN — INSULIN LISPRO 2 UNITS: 100 INJECTION, SOLUTION INTRAVENOUS; SUBCUTANEOUS at 21:08

## 2020-02-22 RX ADMIN — DEXAMETHASONE SODIUM PHOSPHATE 4 MG: 4 INJECTION, SOLUTION INTRA-ARTICULAR; INTRALESIONAL; INTRAMUSCULAR; INTRAVENOUS; SOFT TISSUE at 22:48

## 2020-02-22 RX ADMIN — SERTRALINE 150 MG: 100 TABLET, FILM COATED ORAL at 21:02

## 2020-02-22 RX ADMIN — HYDROCODONE BITARTRATE AND ACETAMINOPHEN 1 TABLET: 5; 325 TABLET ORAL at 22:52

## 2020-02-22 RX ADMIN — PANTOPRAZOLE SODIUM 40 MG: 40 TABLET, DELAYED RELEASE ORAL at 06:51

## 2020-02-22 RX ADMIN — LIDOCAINE 1 PATCH: 50 PATCH CUTANEOUS at 08:26

## 2020-02-22 RX ADMIN — NAPROXEN 250 MG: 250 TABLET ORAL at 08:27

## 2020-02-22 RX ADMIN — ESTRADIOL 1 MG: 1 TABLET ORAL at 21:02

## 2020-02-22 RX ADMIN — HYDROCODONE BITARTRATE AND ACETAMINOPHEN 1 TABLET: 5; 325 TABLET ORAL at 17:05

## 2020-02-22 RX ADMIN — DEXAMETHASONE SODIUM PHOSPHATE 4 MG: 4 INJECTION, SOLUTION INTRA-ARTICULAR; INTRALESIONAL; INTRAMUSCULAR; INTRAVENOUS; SOFT TISSUE at 06:51

## 2020-02-22 RX ADMIN — DEXAMETHASONE SODIUM PHOSPHATE 4 MG: 4 INJECTION, SOLUTION INTRA-ARTICULAR; INTRALESIONAL; INTRAMUSCULAR; INTRAVENOUS; SOFT TISSUE at 17:05

## 2020-02-22 RX ADMIN — HYDROCODONE BITARTRATE AND ACETAMINOPHEN 1 TABLET: 5; 325 TABLET ORAL at 08:27

## 2020-02-23 ENCOUNTER — ANESTHESIA EVENT (OUTPATIENT)
Dept: PERIOP | Facility: HOSPITAL | Age: 50
End: 2020-02-23

## 2020-02-23 ENCOUNTER — ANESTHESIA (OUTPATIENT)
Dept: PERIOP | Facility: HOSPITAL | Age: 50
End: 2020-02-23

## 2020-02-23 ENCOUNTER — APPOINTMENT (OUTPATIENT)
Dept: GENERAL RADIOLOGY | Facility: HOSPITAL | Age: 50
End: 2020-02-23

## 2020-02-23 LAB
GLUCOSE BLDC GLUCOMTR-MCNC: 122 MG/DL (ref 70–130)
GLUCOSE BLDC GLUCOMTR-MCNC: 124 MG/DL (ref 70–130)
GLUCOSE BLDC GLUCOMTR-MCNC: 129 MG/DL (ref 70–130)
GLUCOSE BLDC GLUCOMTR-MCNC: 163 MG/DL (ref 70–130)

## 2020-02-23 PROCEDURE — G0378 HOSPITAL OBSERVATION PER HR: HCPCS

## 2020-02-23 PROCEDURE — 25010000002 SUCCINYLCHOLINE PER 20 MG: Performed by: NURSE ANESTHETIST, CERTIFIED REGISTERED

## 2020-02-23 PROCEDURE — 25010000002 ONDANSETRON PER 1 MG: Performed by: NURSE ANESTHETIST, CERTIFIED REGISTERED

## 2020-02-23 PROCEDURE — 25010000002 HYDROMORPHONE PER 4 MG: Performed by: NURSE ANESTHETIST, CERTIFIED REGISTERED

## 2020-02-23 PROCEDURE — 25010000003 CEFAZOLIN IN DEXTROSE 2-4 GM/100ML-% SOLUTION: Performed by: NEUROLOGICAL SURGERY

## 2020-02-23 PROCEDURE — 25010000002 FENTANYL CITRATE (PF) 100 MCG/2ML SOLUTION: Performed by: NURSE ANESTHETIST, CERTIFIED REGISTERED

## 2020-02-23 PROCEDURE — 25010000002 DEXAMETHASONE PER 1 MG: Performed by: INTERNAL MEDICINE

## 2020-02-23 PROCEDURE — 25010000002 MIDAZOLAM PER 1 MG: Performed by: ANESTHESIOLOGY

## 2020-02-23 PROCEDURE — 25010000002 DEXAMETHASONE PER 1 MG: Performed by: NURSE ANESTHETIST, CERTIFIED REGISTERED

## 2020-02-23 PROCEDURE — 76000 FLUOROSCOPY <1 HR PHYS/QHP: CPT

## 2020-02-23 PROCEDURE — 96376 TX/PRO/DX INJ SAME DRUG ADON: CPT

## 2020-02-23 PROCEDURE — 25010000002 NEOSTIGMINE PER 0.5 MG: Performed by: NURSE ANESTHETIST, CERTIFIED REGISTERED

## 2020-02-23 PROCEDURE — 25010000002 KETOROLAC TROMETHAMINE PER 15 MG: Performed by: NURSE ANESTHETIST, CERTIFIED REGISTERED

## 2020-02-23 PROCEDURE — 25010000002 PROPOFOL 10 MG/ML EMULSION: Performed by: NURSE ANESTHETIST, CERTIFIED REGISTERED

## 2020-02-23 PROCEDURE — 25010000002 METHYLPREDNISOLONE PER 80 MG: Performed by: NEUROLOGICAL SURGERY

## 2020-02-23 PROCEDURE — 63710000001 INSULIN LISPRO (HUMAN) PER 5 UNITS: Performed by: NEUROLOGICAL SURGERY

## 2020-02-23 PROCEDURE — 82962 GLUCOSE BLOOD TEST: CPT

## 2020-02-23 PROCEDURE — 25010000002 METHOCARBAMOL 1000 MG/10ML SOLUTION: Performed by: NEUROLOGICAL SURGERY

## 2020-02-23 PROCEDURE — 63030 LAMOT DCMPRN NRV RT 1 LMBR: CPT | Performed by: NEUROLOGICAL SURGERY

## 2020-02-23 PROCEDURE — 72100 X-RAY EXAM L-S SPINE 2/3 VWS: CPT

## 2020-02-23 PROCEDURE — 25010000002 FENTANYL CITRATE (PF) 100 MCG/2ML SOLUTION: Performed by: ANESTHESIOLOGY

## 2020-02-23 DEVICE — SSC BONE WAX
Type: IMPLANTABLE DEVICE | Site: SPINE LUMBAR | Status: FUNCTIONAL
Brand: SSC BONE WAX

## 2020-02-23 RX ORDER — DEXAMETHASONE SODIUM PHOSPHATE 10 MG/ML
INJECTION INTRAMUSCULAR; INTRAVENOUS AS NEEDED
Status: DISCONTINUED | OUTPATIENT
Start: 2020-02-23 | End: 2020-02-23 | Stop reason: SURG

## 2020-02-23 RX ORDER — HYDROMORPHONE HYDROCHLORIDE 1 MG/ML
0.5 INJECTION, SOLUTION INTRAMUSCULAR; INTRAVENOUS; SUBCUTANEOUS
Status: DISCONTINUED | OUTPATIENT
Start: 2020-02-23 | End: 2020-02-24 | Stop reason: HOSPADM

## 2020-02-23 RX ORDER — LIDOCAINE HYDROCHLORIDE 20 MG/ML
INJECTION, SOLUTION INFILTRATION; PERINEURAL AS NEEDED
Status: DISCONTINUED | OUTPATIENT
Start: 2020-02-23 | End: 2020-02-23 | Stop reason: SURG

## 2020-02-23 RX ORDER — SODIUM CHLORIDE, SODIUM LACTATE, POTASSIUM CHLORIDE, CALCIUM CHLORIDE 600; 310; 30; 20 MG/100ML; MG/100ML; MG/100ML; MG/100ML
80 INJECTION, SOLUTION INTRAVENOUS CONTINUOUS
Status: DISCONTINUED | OUTPATIENT
Start: 2020-02-23 | End: 2020-02-24 | Stop reason: HOSPADM

## 2020-02-23 RX ORDER — FENTANYL CITRATE 50 UG/ML
50 INJECTION, SOLUTION INTRAMUSCULAR; INTRAVENOUS
Status: DISCONTINUED | OUTPATIENT
Start: 2020-02-23 | End: 2020-02-23 | Stop reason: HOSPADM

## 2020-02-23 RX ORDER — ONDANSETRON 2 MG/ML
4 INJECTION INTRAMUSCULAR; INTRAVENOUS EVERY 6 HOURS PRN
Status: DISCONTINUED | OUTPATIENT
Start: 2020-02-23 | End: 2020-02-24 | Stop reason: HOSPADM

## 2020-02-23 RX ORDER — SODIUM CHLORIDE 0.9 % (FLUSH) 0.9 %
3 SYRINGE (ML) INJECTION EVERY 12 HOURS SCHEDULED
Status: DISCONTINUED | OUTPATIENT
Start: 2020-02-23 | End: 2020-02-23 | Stop reason: HOSPADM

## 2020-02-23 RX ORDER — LIDOCAINE HYDROCHLORIDE 10 MG/ML
0.5 INJECTION, SOLUTION EPIDURAL; INFILTRATION; INTRACAUDAL; PERINEURAL ONCE AS NEEDED
Status: DISCONTINUED | OUTPATIENT
Start: 2020-02-23 | End: 2020-02-23 | Stop reason: HOSPADM

## 2020-02-23 RX ORDER — PROMETHAZINE HYDROCHLORIDE 25 MG/1
25 TABLET ORAL ONCE AS NEEDED
Status: DISCONTINUED | OUTPATIENT
Start: 2020-02-23 | End: 2020-02-23 | Stop reason: HOSPADM

## 2020-02-23 RX ORDER — SODIUM CHLORIDE, SODIUM LACTATE, POTASSIUM CHLORIDE, CALCIUM CHLORIDE 600; 310; 30; 20 MG/100ML; MG/100ML; MG/100ML; MG/100ML
9 INJECTION, SOLUTION INTRAVENOUS CONTINUOUS
Status: DISCONTINUED | OUTPATIENT
Start: 2020-02-23 | End: 2020-02-23

## 2020-02-23 RX ORDER — FLUMAZENIL 0.1 MG/ML
0.2 INJECTION INTRAVENOUS AS NEEDED
Status: DISCONTINUED | OUTPATIENT
Start: 2020-02-23 | End: 2020-02-23 | Stop reason: HOSPADM

## 2020-02-23 RX ORDER — DIPHENHYDRAMINE HYDROCHLORIDE 50 MG/ML
12.5 INJECTION INTRAMUSCULAR; INTRAVENOUS
Status: DISCONTINUED | OUTPATIENT
Start: 2020-02-23 | End: 2020-02-23 | Stop reason: HOSPADM

## 2020-02-23 RX ORDER — HYDROMORPHONE HYDROCHLORIDE 1 MG/ML
0.5 INJECTION, SOLUTION INTRAMUSCULAR; INTRAVENOUS; SUBCUTANEOUS
Status: DISCONTINUED | OUTPATIENT
Start: 2020-02-23 | End: 2020-02-23 | Stop reason: HOSPADM

## 2020-02-23 RX ORDER — METHYLPREDNISOLONE ACETATE 80 MG/ML
INJECTION, SUSPENSION INTRA-ARTICULAR; INTRALESIONAL; INTRAMUSCULAR; SOFT TISSUE
Status: DISPENSED
Start: 2020-02-23 | End: 2020-02-24

## 2020-02-23 RX ORDER — HYDROCODONE BITARTRATE AND ACETAMINOPHEN 7.5; 325 MG/1; MG/1
1 TABLET ORAL ONCE AS NEEDED
Status: DISCONTINUED | OUTPATIENT
Start: 2020-02-23 | End: 2020-02-23 | Stop reason: HOSPADM

## 2020-02-23 RX ORDER — NALOXONE HCL 0.4 MG/ML
0.2 VIAL (ML) INJECTION AS NEEDED
Status: DISCONTINUED | OUTPATIENT
Start: 2020-02-23 | End: 2020-02-23 | Stop reason: HOSPADM

## 2020-02-23 RX ORDER — ONDANSETRON 4 MG/1
4 TABLET, FILM COATED ORAL EVERY 6 HOURS PRN
Status: DISCONTINUED | OUTPATIENT
Start: 2020-02-23 | End: 2020-02-24 | Stop reason: HOSPADM

## 2020-02-23 RX ORDER — CEFAZOLIN SODIUM 2 G/100ML
2 INJECTION, SOLUTION INTRAVENOUS EVERY 8 HOURS
Status: COMPLETED | OUTPATIENT
Start: 2020-02-23 | End: 2020-02-24

## 2020-02-23 RX ORDER — ACETAMINOPHEN 325 MG/1
650 TABLET ORAL EVERY 4 HOURS PRN
Status: DISCONTINUED | OUTPATIENT
Start: 2020-02-23 | End: 2020-02-24 | Stop reason: HOSPADM

## 2020-02-23 RX ORDER — DOCUSATE SODIUM 100 MG/1
100 CAPSULE, LIQUID FILLED ORAL 2 TIMES DAILY PRN
Status: DISCONTINUED | OUTPATIENT
Start: 2020-02-23 | End: 2020-02-24 | Stop reason: HOSPADM

## 2020-02-23 RX ORDER — ONDANSETRON 2 MG/ML
INJECTION INTRAMUSCULAR; INTRAVENOUS AS NEEDED
Status: DISCONTINUED | OUTPATIENT
Start: 2020-02-23 | End: 2020-02-23 | Stop reason: SURG

## 2020-02-23 RX ORDER — SENNA AND DOCUSATE SODIUM 50; 8.6 MG/1; MG/1
1 TABLET, FILM COATED ORAL NIGHTLY PRN
Status: DISCONTINUED | OUTPATIENT
Start: 2020-02-23 | End: 2020-02-24 | Stop reason: HOSPADM

## 2020-02-23 RX ORDER — CYCLOBENZAPRINE HCL 10 MG
10 TABLET ORAL 3 TIMES DAILY PRN
Status: DISCONTINUED | OUTPATIENT
Start: 2020-02-23 | End: 2020-02-23 | Stop reason: SDUPTHER

## 2020-02-23 RX ORDER — CEFAZOLIN SODIUM 2 G/100ML
2 INJECTION, SOLUTION INTRAVENOUS ONCE
Status: COMPLETED | OUTPATIENT
Start: 2020-02-23 | End: 2020-02-23

## 2020-02-23 RX ORDER — ROCURONIUM BROMIDE 10 MG/ML
INJECTION, SOLUTION INTRAVENOUS AS NEEDED
Status: DISCONTINUED | OUTPATIENT
Start: 2020-02-23 | End: 2020-02-23 | Stop reason: SURG

## 2020-02-23 RX ORDER — METHYLPREDNISOLONE ACETATE 80 MG/ML
INJECTION, SUSPENSION INTRA-ARTICULAR; INTRALESIONAL; INTRAMUSCULAR; SOFT TISSUE AS NEEDED
Status: DISCONTINUED | OUTPATIENT
Start: 2020-02-23 | End: 2020-02-23 | Stop reason: HOSPADM

## 2020-02-23 RX ORDER — ONDANSETRON 2 MG/ML
4 INJECTION INTRAMUSCULAR; INTRAVENOUS ONCE AS NEEDED
Status: COMPLETED | OUTPATIENT
Start: 2020-02-23 | End: 2020-02-23

## 2020-02-23 RX ORDER — SUCCINYLCHOLINE CHLORIDE 20 MG/ML
INJECTION INTRAMUSCULAR; INTRAVENOUS AS NEEDED
Status: DISCONTINUED | OUTPATIENT
Start: 2020-02-23 | End: 2020-02-23 | Stop reason: SURG

## 2020-02-23 RX ORDER — PROMETHAZINE HYDROCHLORIDE 25 MG/ML
6.25 INJECTION, SOLUTION INTRAMUSCULAR; INTRAVENOUS
Status: DISCONTINUED | OUTPATIENT
Start: 2020-02-23 | End: 2020-02-23 | Stop reason: HOSPADM

## 2020-02-23 RX ORDER — BUPIVACAINE HYDROCHLORIDE AND EPINEPHRINE 2.5; 5 MG/ML; UG/ML
INJECTION, SOLUTION INFILTRATION; PERINEURAL AS NEEDED
Status: DISCONTINUED | OUTPATIENT
Start: 2020-02-23 | End: 2020-02-23 | Stop reason: HOSPADM

## 2020-02-23 RX ORDER — HYDROCODONE BITARTRATE AND ACETAMINOPHEN 5; 325 MG/1; MG/1
1 TABLET ORAL EVERY 4 HOURS PRN
Status: DISCONTINUED | OUTPATIENT
Start: 2020-02-23 | End: 2020-02-24 | Stop reason: HOSPADM

## 2020-02-23 RX ORDER — HYDRALAZINE HYDROCHLORIDE 20 MG/ML
5 INJECTION INTRAMUSCULAR; INTRAVENOUS
Status: DISCONTINUED | OUTPATIENT
Start: 2020-02-23 | End: 2020-02-23 | Stop reason: HOSPADM

## 2020-02-23 RX ORDER — DIPHENHYDRAMINE HCL 25 MG
25 CAPSULE ORAL
Status: DISCONTINUED | OUTPATIENT
Start: 2020-02-23 | End: 2020-02-23 | Stop reason: HOSPADM

## 2020-02-23 RX ORDER — FAMOTIDINE 10 MG/ML
20 INJECTION, SOLUTION INTRAVENOUS ONCE
Status: COMPLETED | OUTPATIENT
Start: 2020-02-23 | End: 2020-02-23

## 2020-02-23 RX ORDER — SODIUM CHLORIDE 0.9 % (FLUSH) 0.9 %
3 SYRINGE (ML) INJECTION EVERY 12 HOURS SCHEDULED
Status: DISCONTINUED | OUTPATIENT
Start: 2020-02-23 | End: 2020-02-24 | Stop reason: HOSPADM

## 2020-02-23 RX ORDER — HYDROMORPHONE HCL 110MG/55ML
PATIENT CONTROLLED ANALGESIA SYRINGE INTRAVENOUS AS NEEDED
Status: DISCONTINUED | OUTPATIENT
Start: 2020-02-23 | End: 2020-02-23 | Stop reason: SURG

## 2020-02-23 RX ORDER — PROPOFOL 10 MG/ML
VIAL (ML) INTRAVENOUS AS NEEDED
Status: DISCONTINUED | OUTPATIENT
Start: 2020-02-23 | End: 2020-02-23 | Stop reason: SURG

## 2020-02-23 RX ORDER — SODIUM CHLORIDE 0.9 % (FLUSH) 0.9 %
3-10 SYRINGE (ML) INJECTION AS NEEDED
Status: DISCONTINUED | OUTPATIENT
Start: 2020-02-23 | End: 2020-02-23 | Stop reason: HOSPADM

## 2020-02-23 RX ORDER — OXYCODONE AND ACETAMINOPHEN 7.5; 325 MG/1; MG/1
1 TABLET ORAL ONCE AS NEEDED
Status: DISCONTINUED | OUTPATIENT
Start: 2020-02-23 | End: 2020-02-23 | Stop reason: HOSPADM

## 2020-02-23 RX ORDER — MIDAZOLAM HYDROCHLORIDE 1 MG/ML
2 INJECTION INTRAMUSCULAR; INTRAVENOUS
Status: DISCONTINUED | OUTPATIENT
Start: 2020-02-23 | End: 2020-02-23 | Stop reason: HOSPADM

## 2020-02-23 RX ORDER — MIDAZOLAM HYDROCHLORIDE 1 MG/ML
1 INJECTION INTRAMUSCULAR; INTRAVENOUS
Status: DISCONTINUED | OUTPATIENT
Start: 2020-02-23 | End: 2020-02-23 | Stop reason: HOSPADM

## 2020-02-23 RX ORDER — PROMETHAZINE HYDROCHLORIDE 25 MG/1
25 SUPPOSITORY RECTAL ONCE AS NEEDED
Status: DISCONTINUED | OUTPATIENT
Start: 2020-02-23 | End: 2020-02-23 | Stop reason: HOSPADM

## 2020-02-23 RX ORDER — PROMETHAZINE HYDROCHLORIDE 25 MG/ML
12.5 INJECTION, SOLUTION INTRAMUSCULAR; INTRAVENOUS ONCE AS NEEDED
Status: DISCONTINUED | OUTPATIENT
Start: 2020-02-23 | End: 2020-02-23 | Stop reason: HOSPADM

## 2020-02-23 RX ORDER — SODIUM CHLORIDE 0.9 % (FLUSH) 0.9 %
10 SYRINGE (ML) INJECTION AS NEEDED
Status: DISCONTINUED | OUTPATIENT
Start: 2020-02-23 | End: 2020-02-24 | Stop reason: HOSPADM

## 2020-02-23 RX ORDER — METHOCARBAMOL 100 MG/ML
1000 INJECTION, SOLUTION INTRAMUSCULAR; INTRAVENOUS ONCE
Status: COMPLETED | OUTPATIENT
Start: 2020-02-23 | End: 2020-02-23

## 2020-02-23 RX ORDER — LABETALOL HYDROCHLORIDE 5 MG/ML
5 INJECTION, SOLUTION INTRAVENOUS
Status: DISCONTINUED | OUTPATIENT
Start: 2020-02-23 | End: 2020-02-23 | Stop reason: HOSPADM

## 2020-02-23 RX ORDER — ACETAMINOPHEN 325 MG/1
650 TABLET ORAL ONCE AS NEEDED
Status: DISCONTINUED | OUTPATIENT
Start: 2020-02-23 | End: 2020-02-23 | Stop reason: HOSPADM

## 2020-02-23 RX ORDER — KETOROLAC TROMETHAMINE 30 MG/ML
INJECTION, SOLUTION INTRAMUSCULAR; INTRAVENOUS AS NEEDED
Status: DISCONTINUED | OUTPATIENT
Start: 2020-02-23 | End: 2020-02-23 | Stop reason: SURG

## 2020-02-23 RX ORDER — GLYCOPYRROLATE 0.2 MG/ML
INJECTION INTRAMUSCULAR; INTRAVENOUS AS NEEDED
Status: DISCONTINUED | OUTPATIENT
Start: 2020-02-23 | End: 2020-02-23 | Stop reason: SURG

## 2020-02-23 RX ORDER — SCOLOPAMINE TRANSDERMAL SYSTEM 1 MG/1
1 PATCH, EXTENDED RELEASE TRANSDERMAL ONCE
Status: DISCONTINUED | OUTPATIENT
Start: 2020-02-23 | End: 2020-02-23

## 2020-02-23 RX ORDER — NALOXONE HCL 0.4 MG/ML
0.4 VIAL (ML) INJECTION
Status: DISCONTINUED | OUTPATIENT
Start: 2020-02-23 | End: 2020-02-24 | Stop reason: HOSPADM

## 2020-02-23 RX ORDER — EPHEDRINE SULFATE 50 MG/ML
5 INJECTION, SOLUTION INTRAVENOUS ONCE AS NEEDED
Status: DISCONTINUED | OUTPATIENT
Start: 2020-02-23 | End: 2020-02-23 | Stop reason: HOSPADM

## 2020-02-23 RX ADMIN — CEFAZOLIN SODIUM 2 G: 2 INJECTION, SOLUTION INTRAVENOUS at 19:42

## 2020-02-23 RX ADMIN — INSULIN LISPRO 2 UNITS: 100 INJECTION, SOLUTION INTRAVENOUS; SUBCUTANEOUS at 20:56

## 2020-02-23 RX ADMIN — FENTANYL CITRATE 100 MCG: 50 INJECTION INTRAMUSCULAR; INTRAVENOUS at 12:41

## 2020-02-23 RX ADMIN — ONDANSETRON HYDROCHLORIDE 4 MG: 2 SOLUTION INTRAMUSCULAR; INTRAVENOUS at 13:36

## 2020-02-23 RX ADMIN — HYDROMORPHONE HYDROCHLORIDE 0.5 MG: 2 INJECTION, SOLUTION INTRAMUSCULAR; INTRAVENOUS; SUBCUTANEOUS at 13:05

## 2020-02-23 RX ADMIN — DEXAMETHASONE SODIUM PHOSPHATE 8 MG: 10 INJECTION INTRAMUSCULAR; INTRAVENOUS at 12:48

## 2020-02-23 RX ADMIN — MIDAZOLAM 1 MG: 1 INJECTION INTRAMUSCULAR; INTRAVENOUS at 12:24

## 2020-02-23 RX ADMIN — HYDROMORPHONE HYDROCHLORIDE 0.5 MG: 2 INJECTION, SOLUTION INTRAMUSCULAR; INTRAVENOUS; SUBCUTANEOUS at 13:36

## 2020-02-23 RX ADMIN — CEFAZOLIN SODIUM 2 G: 2 INJECTION, SOLUTION INTRAVENOUS at 12:46

## 2020-02-23 RX ADMIN — FAMOTIDINE 20 MG: 10 INJECTION INTRAVENOUS at 12:25

## 2020-02-23 RX ADMIN — FENTANYL CITRATE 50 MCG: 50 INJECTION, SOLUTION INTRAMUSCULAR; INTRAVENOUS at 14:40

## 2020-02-23 RX ADMIN — PROPOFOL 200 MG: 10 INJECTION, EMULSION INTRAVENOUS at 12:41

## 2020-02-23 RX ADMIN — HYDROCODONE BITARTRATE AND ACETAMINOPHEN 1 TABLET: 5; 325 TABLET ORAL at 22:34

## 2020-02-23 RX ADMIN — GLYCOPYRROLATE 0.2 MG: 0.2 INJECTION INTRAMUSCULAR; INTRAVENOUS at 13:43

## 2020-02-23 RX ADMIN — HYDROCODONE BITARTRATE AND ACETAMINOPHEN 1 TABLET: 5; 325 TABLET ORAL at 18:32

## 2020-02-23 RX ADMIN — ROCURONIUM BROMIDE 10 MG: 10 INJECTION, SOLUTION INTRAVENOUS at 12:41

## 2020-02-23 RX ADMIN — FENTANYL CITRATE 50 MCG: 50 INJECTION, SOLUTION INTRAMUSCULAR; INTRAVENOUS at 14:16

## 2020-02-23 RX ADMIN — SUCCINYLCHOLINE CHLORIDE 100 MG: 20 INJECTION, SOLUTION INTRAMUSCULAR; INTRAVENOUS; PARENTERAL at 12:41

## 2020-02-23 RX ADMIN — SCOPOLAMINE 1 PATCH: 1 PATCH TRANSDERMAL at 12:24

## 2020-02-23 RX ADMIN — SODIUM CHLORIDE, POTASSIUM CHLORIDE, SODIUM LACTATE AND CALCIUM CHLORIDE 9 ML/HR: 600; 310; 30; 20 INJECTION, SOLUTION INTRAVENOUS at 12:25

## 2020-02-23 RX ADMIN — SODIUM CHLORIDE, POTASSIUM CHLORIDE, SODIUM LACTATE AND CALCIUM CHLORIDE: 600; 310; 30; 20 INJECTION, SOLUTION INTRAVENOUS at 13:49

## 2020-02-23 RX ADMIN — KETOROLAC TROMETHAMINE 30 MG: 30 INJECTION, SOLUTION INTRAMUSCULAR; INTRAVENOUS at 13:50

## 2020-02-23 RX ADMIN — METHOCARBAMOL 1000 MG: 1000 INJECTION, SOLUTION INTRAMUSCULAR; INTRAVENOUS at 14:17

## 2020-02-23 RX ADMIN — NEOSTIGMINE METHYLSULFATE 3 MG: 1 INJECTION INTRAMUSCULAR; INTRAVENOUS; SUBCUTANEOUS at 13:43

## 2020-02-23 RX ADMIN — HYDROCODONE BITARTRATE AND ACETAMINOPHEN 1 TABLET: 5; 325 TABLET ORAL at 05:34

## 2020-02-23 RX ADMIN — LIDOCAINE HYDROCHLORIDE 100 MG: 20 INJECTION, SOLUTION INFILTRATION; PERINEURAL at 12:41

## 2020-02-23 RX ADMIN — ONDANSETRON 4 MG: 2 INJECTION INTRAMUSCULAR; INTRAVENOUS at 14:15

## 2020-02-23 RX ADMIN — DEXAMETHASONE SODIUM PHOSPHATE 4 MG: 4 INJECTION, SOLUTION INTRA-ARTICULAR; INTRALESIONAL; INTRAMUSCULAR; INTRAVENOUS; SOFT TISSUE at 10:17

## 2020-02-23 RX ADMIN — DEXAMETHASONE SODIUM PHOSPHATE 4 MG: 4 INJECTION, SOLUTION INTRA-ARTICULAR; INTRALESIONAL; INTRAMUSCULAR; INTRAVENOUS; SOFT TISSUE at 05:34

## 2020-02-23 NOTE — ANESTHESIA POSTPROCEDURE EVALUATION
"Patient: Anu Adam    Procedure Summary     Date:  02/23/20 Room / Location:  [facility] OR [address] [place] [address] MAIN OR    Anesthesia Start:  1239 Anesthesia Stop:  1404    Procedure:  L3 & L4 MICRO DISCECTOMY WITH METRIX (Right Back) Diagnosis:      Surgeon:  Edwin Mcgraw MD Provider:  Claudio Cali MD    Anesthesia Type:  general ASA Status:  2          Anesthesia Type: general    Vitals  Vitals Value Taken Time   /80 2/23/2020  2:15 PM   Temp 36.6 °C (97.9 °F) 2/23/2020  1:59 PM   Pulse 83 2/23/2020  2:20 PM   Resp 12 2/23/2020  1:59 PM   SpO2 96 % 2/23/2020  2:20 PM   Vitals shown include unvalidated device data.        Post Anesthesia Care and Evaluation    Patient location during evaluation: bedside  Patient participation: complete - patient participated  Level of consciousness: awake and alert  Pain management: adequate  Airway patency: patent  Anesthetic complications: No anesthetic complications    Cardiovascular status: acceptable  Respiratory status: acceptable  Hydration status: acceptable    Comments: BP (!) 149/105 (BP Location: Right arm, Patient Position: Lying)   Pulse 88   Temp 36.6 °C (97.9 °F) (Oral)   Resp 12   Ht 157.5 cm (62\")   Wt 76.4 kg (168 lb 8 oz)   LMP 11/16/2013 Comment: patient states her last period was six years ago and is post menopausal   SpO2 99%   BMI 30.82 kg/m²       "

## 2020-02-23 NOTE — ANESTHESIA PROCEDURE NOTES
Airway  Urgency: elective    Date/Time: 2/23/2020 12:41 PM  Airway not difficult    General Information and Staff    Patient location during procedure: OR  CRNA: Oren Garcia CRNA    Indications and Patient Condition  Indications for airway management: airway protection    Preoxygenated: yes  MILS maintained throughout  Mask difficulty assessment: 1 - vent by mask    Final Airway Details  Final airway type: endotracheal airway      Successful airway: ETT  Cuffed: yes   Successful intubation technique: direct laryngoscopy  Endotracheal tube insertion site: oral  Blade: Francisco Javier  Blade size: 3  ETT size (mm): 7.0  Cormack-Lehane Classification: grade I - full view of glottis  Placement verified by: chest auscultation   Measured from: teeth  ETT/EBT  to teeth (cm): 21  Number of attempts at approach: 1  Assessment: lips, teeth, and gum same as pre-op and atraumatic intubation

## 2020-02-23 NOTE — ADDENDUM NOTE
Addendum  created 02/23/20 1845 by Claudio Cali MD    Attestation recorded in Intraprocedure, Intraprocedure Attestations filed

## 2020-02-23 NOTE — ANESTHESIA PREPROCEDURE EVALUATION
Anesthesia Evaluation     Patient summary reviewed and Nursing notes reviewed   history of anesthetic complications: PONV  NPO Solid Status: > 8 hours  NPO Liquid Status: > 2 hours           Airway   Mallampati: II  TM distance: >3 FB  Neck ROM: full  no difficulty expected and Anterior  Dental - normal exam     Pulmonary - negative pulmonary ROS and normal exam    breath sounds clear to auscultation  (-) decreased breath sounds, wheezes  Cardiovascular - normal exam  Exercise tolerance: good (4-7 METS)    Rhythm: regular  Rate: normal    (+) hyperlipidemia,   (-) hypertension      Neuro/Psych- negative ROS  (-) seizures, CVA  GI/Hepatic/Renal/Endo    (+) obesity,     (-) diabetes    Musculoskeletal (-) negative ROS    Abdominal  - normal exam   Substance History - negative use  (-) alcohol use, drug use     OB/GYN negative ob/gyn ROS         Other - negative ROS                       Anesthesia Plan    ASA 2     general     intravenous induction     Anesthetic plan, all risks, benefits, and alternatives have been provided, discussed and informed consent has been obtained with: patient.    Plan discussed with CRNA.

## 2020-02-24 VITALS
HEIGHT: 62 IN | RESPIRATION RATE: 16 BRPM | WEIGHT: 168.5 LBS | TEMPERATURE: 97.9 F | BODY MASS INDEX: 31.01 KG/M2 | SYSTOLIC BLOOD PRESSURE: 112 MMHG | HEART RATE: 66 BPM | OXYGEN SATURATION: 97 % | DIASTOLIC BLOOD PRESSURE: 74 MMHG

## 2020-02-24 LAB
ANION GAP SERPL CALCULATED.3IONS-SCNC: 12.8 MMOL/L (ref 5–15)
BASOPHILS # BLD AUTO: 0.01 10*3/MM3 (ref 0–0.2)
BASOPHILS NFR BLD AUTO: 0.1 % (ref 0–1.5)
BUN BLD-MCNC: 23 MG/DL (ref 6–20)
BUN/CREAT SERPL: 31.5 (ref 7–25)
CALCIUM SPEC-SCNC: 8.9 MG/DL (ref 8.6–10.5)
CHLORIDE SERPL-SCNC: 100 MMOL/L (ref 98–107)
CO2 SERPL-SCNC: 24.2 MMOL/L (ref 22–29)
CREAT BLD-MCNC: 0.73 MG/DL (ref 0.57–1)
DEPRECATED RDW RBC AUTO: 43.6 FL (ref 37–54)
EOSINOPHIL # BLD AUTO: 0 10*3/MM3 (ref 0–0.4)
EOSINOPHIL NFR BLD AUTO: 0 % (ref 0.3–6.2)
ERYTHROCYTE [DISTWIDTH] IN BLOOD BY AUTOMATED COUNT: 12.6 % (ref 12.3–15.4)
GFR SERPL CREATININE-BSD FRML MDRD: 84 ML/MIN/1.73
GLUCOSE BLD-MCNC: 139 MG/DL (ref 65–99)
GLUCOSE BLDC GLUCOMTR-MCNC: 103 MG/DL (ref 70–130)
GLUCOSE BLDC GLUCOMTR-MCNC: 120 MG/DL (ref 70–130)
HCT VFR BLD AUTO: 39.6 % (ref 34–46.6)
HGB BLD-MCNC: 13.4 G/DL (ref 12–15.9)
IMM GRANULOCYTES # BLD AUTO: 0.18 10*3/MM3 (ref 0–0.05)
IMM GRANULOCYTES NFR BLD AUTO: 1.2 % (ref 0–0.5)
LYMPHOCYTES # BLD AUTO: 1.8 10*3/MM3 (ref 0.7–3.1)
LYMPHOCYTES NFR BLD AUTO: 11.8 % (ref 19.6–45.3)
MAGNESIUM SERPL-MCNC: 2.2 MG/DL (ref 1.6–2.6)
MCH RBC QN AUTO: 31.5 PG (ref 26.6–33)
MCHC RBC AUTO-ENTMCNC: 33.8 G/DL (ref 31.5–35.7)
MCV RBC AUTO: 93 FL (ref 79–97)
MONOCYTES # BLD AUTO: 1.66 10*3/MM3 (ref 0.1–0.9)
MONOCYTES NFR BLD AUTO: 10.9 % (ref 5–12)
NEUTROPHILS # BLD AUTO: 11.54 10*3/MM3 (ref 1.7–7)
NEUTROPHILS NFR BLD AUTO: 76 % (ref 42.7–76)
NRBC BLD AUTO-RTO: 0 /100 WBC (ref 0–0.2)
PLATELET # BLD AUTO: 297 10*3/MM3 (ref 140–450)
PMV BLD AUTO: 9.4 FL (ref 6–12)
POTASSIUM BLD-SCNC: 4 MMOL/L (ref 3.5–5.2)
RBC # BLD AUTO: 4.26 10*6/MM3 (ref 3.77–5.28)
SODIUM BLD-SCNC: 137 MMOL/L (ref 136–145)
WBC NRBC COR # BLD: 15.19 10*3/MM3 (ref 3.4–10.8)

## 2020-02-24 PROCEDURE — 85025 COMPLETE CBC W/AUTO DIFF WBC: CPT | Performed by: INTERNAL MEDICINE

## 2020-02-24 PROCEDURE — G0378 HOSPITAL OBSERVATION PER HR: HCPCS

## 2020-02-24 PROCEDURE — 82962 GLUCOSE BLOOD TEST: CPT

## 2020-02-24 PROCEDURE — 25010000003 CEFAZOLIN IN DEXTROSE 2-4 GM/100ML-% SOLUTION: Performed by: NEUROLOGICAL SURGERY

## 2020-02-24 PROCEDURE — 97530 THERAPEUTIC ACTIVITIES: CPT

## 2020-02-24 PROCEDURE — 80048 BASIC METABOLIC PNL TOTAL CA: CPT | Performed by: INTERNAL MEDICINE

## 2020-02-24 PROCEDURE — 99024 POSTOP FOLLOW-UP VISIT: CPT | Performed by: NURSE PRACTITIONER

## 2020-02-24 PROCEDURE — 97161 PT EVAL LOW COMPLEX 20 MIN: CPT

## 2020-02-24 PROCEDURE — 83735 ASSAY OF MAGNESIUM: CPT | Performed by: INTERNAL MEDICINE

## 2020-02-24 RX ORDER — HYDROCODONE BITARTRATE AND ACETAMINOPHEN 5; 325 MG/1; MG/1
1 TABLET ORAL EVERY 4 HOURS PRN
Qty: 10 TABLET | Refills: 0 | Status: SHIPPED | OUTPATIENT
Start: 2020-02-24 | End: 2020-03-01

## 2020-02-24 RX ADMIN — LEVOTHYROXINE SODIUM 112 MCG: 0.11 TABLET ORAL at 04:57

## 2020-02-24 RX ADMIN — ESTRADIOL 1 MG: 1 TABLET ORAL at 08:20

## 2020-02-24 RX ADMIN — SERTRALINE 150 MG: 100 TABLET, FILM COATED ORAL at 08:20

## 2020-02-24 RX ADMIN — HYDROCODONE BITARTRATE AND ACETAMINOPHEN 1 TABLET: 5; 325 TABLET ORAL at 06:16

## 2020-02-24 RX ADMIN — PANTOPRAZOLE SODIUM 40 MG: 40 TABLET, DELAYED RELEASE ORAL at 04:57

## 2020-02-24 RX ADMIN — HYDROCODONE BITARTRATE AND ACETAMINOPHEN 1 TABLET: 5; 325 TABLET ORAL at 11:11

## 2020-02-24 RX ADMIN — HYDROCODONE BITARTRATE AND ACETAMINOPHEN 1 TABLET: 5; 325 TABLET ORAL at 16:14

## 2020-02-24 RX ADMIN — SODIUM CHLORIDE, PRESERVATIVE FREE 3 ML: 5 INJECTION INTRAVENOUS at 08:22

## 2020-02-24 RX ADMIN — CEFAZOLIN SODIUM 2 G: 2 INJECTION, SOLUTION INTRAVENOUS at 04:57

## 2020-02-24 RX ADMIN — HYDROCODONE BITARTRATE AND ACETAMINOPHEN 1 TABLET: 5; 325 TABLET ORAL at 02:30

## 2020-02-25 LAB — GLUCOSE BLDC GLUCOMTR-MCNC: 187 MG/DL (ref 70–130)

## 2020-02-28 ENCOUNTER — TELEPHONE (OUTPATIENT)
Dept: NEUROSURGERY | Facility: CLINIC | Age: 50
End: 2020-02-28

## 2020-02-28 NOTE — TELEPHONE ENCOUNTER
Spoke with patient and informed her that she can take Benadryl and she can apply a very thin layer of hydrocortisone cream around the incision, BUT do not get any on the actual incision    Patient voiced understanding

## 2020-02-28 NOTE — TELEPHONE ENCOUNTER
Pt called stating that she has a rash around the incision where the bandage was. Pt states itchy and red. Pt had surgery on 2/23. Please call and advise.

## 2020-03-02 ENCOUNTER — OFFICE VISIT (OUTPATIENT)
Dept: NEUROSURGERY | Facility: CLINIC | Age: 50
End: 2020-03-02

## 2020-03-02 VITALS
WEIGHT: 162 LBS | HEART RATE: 64 BPM | TEMPERATURE: 97.1 F | HEIGHT: 62 IN | BODY MASS INDEX: 29.81 KG/M2 | DIASTOLIC BLOOD PRESSURE: 84 MMHG | SYSTOLIC BLOOD PRESSURE: 124 MMHG

## 2020-03-02 DIAGNOSIS — L24.0 IRRITANT CONTACT DERMATITIS DUE TO DETERGENT: ICD-10-CM

## 2020-03-02 DIAGNOSIS — Z09 SURGICAL FOLLOWUP VISIT: Primary | ICD-10-CM

## 2020-03-02 PROCEDURE — 99024 POSTOP FOLLOW-UP VISIT: CPT | Performed by: NURSE PRACTITIONER

## 2020-03-02 NOTE — PROGRESS NOTES
Subjective   Patient ID: Anu Adam is a 50 y.o. female is here today for a wound check reaction to tape.  Pt had L3/4 micro disc on 2/23/20 with Dr Mcgraw.   Pt is c/o redness and blisters around the incision site.  Pt is having no back pain, but reports R leg pain and numbness. Her R leg feels like is going to give out at times.    History of Present Illness      Review of Systems   Constitutional: Negative for chills and fever.   Genitourinary: Negative for difficulty urinating and enuresis.   Musculoskeletal: Negative for back pain (R leg pain).   Skin: Positive for rash and wound.   Neurological: Positive for weakness (R leg) and numbness (R leg).   Psychiatric/Behavioral: Negative for sleep disturbance.       Objective   Physical Exam   Skin:   Small lumbar incision is well approximated.  No evidence of swelling or drainage from the incision site.  The surrounding skin is raised, red, blistery and irritated.  The area of irritation is in the shape of a perfect square indicating probable allergic reaction to the pre-surgical chlorhexidine scrub.   Vitals reviewed.     Neurologic Exam    Assessment/Plan   I  Medical Decision Making:      Ms. Adam is doing well from a postoperative standpoint however she continues to feel some discomfort in her right leg and a sense of heaviness due to some numbness.  I reassured her and explained that the symptoms should get better with time.  The purpose of her visit today is regarding a contact dermatitis that is developed as a result of the preop scrub.    I instructed the patient to keep the area clean and dry.  Apply betamethasone cream in sparing amounts to the irritated skin, not directly on the incision.  She should leave this open to air.  Apply the cream twice daily and shower daily.  Should the symptoms not improve in the next couple of days she will call the office otherwise I will see her back in 4 days for re-evaluation.    Diagnoses and all orders for this  visit:    Surgical followup visit    Irritant contact dermatitis due to surgical scrub detergent    Other orders  -     betamethasone valerate (VALISONE) 0.1 % cream; Apply sparingly to affected area twice daily and leave open to air.      Return in about 4 days (around 3/6/2020) for Rae Hinojosa.

## 2020-03-04 NOTE — PROGRESS NOTES
Subjective   Patient ID: Anu Adam is a 50 y.o. female is here today for a wound check after L3/4 micro disc on 2/23/20 by Dr Mcgraw.  No new imaging.  Incision site is red, rash is improving.       History of Present Illness    Ms. Adam is here today for a scheduled wound check.  I saw her in the office on Monday for complaints of severe itching and rash to the skin surrounding the lumbar incision.  She has been applying the prescribed ointment sparingly to the site, avoiding the actual lumbar incision line.  Today the patient reports provement in the itching.  The skin is beginning to slough off.  There is still some associated redness to the area of rash.    Ms. Adam is also complaining of some worsening pain in her right leg.  She is also out of pain medication.      Review of Systems   Constitutional: Negative for chills and fever.   Genitourinary: Negative for difficulty urinating and dysuria.   Musculoskeletal: Positive for arthralgias (R leg pain) and back pain (R leg to calf).   Skin: Positive for wound (redness, swelling, drainage).        itching   Neurological: Positive for numbness (r leg). Negative for weakness.   Psychiatric/Behavioral: Positive for sleep disturbance.       Objective   Physical Exam   Constitutional: She appears well-developed. No distress.   Skin: Skin is warm and dry.   Lumbar incision is well approximated. No redness, swelling or drainage at incision line. The surrounding skin around the incision is improving.  There is left erythema.  The skin is beginning to slough off.  There is no signs of swelling or infection..    Psychiatric: She has a normal mood and affect.   Vitals reviewed.    Neurologic Exam    Assessment/Plan     Medical Decision Making:      Ms. Adam is here today for reevaluation of her lumbar incision.  The incision is healing well is well approximated.  There is still some redness to the skin surrounding the incision.  This is likely related to the  presurgical solution used to cleanse the surgical area.  The area is still shaped perfect rectangular shape.  The redness is dissipating.  There is some noted skin sloughing which is expected.    I explained to Ms. Adam that the nerve pain will take some time to improve.  She will try a Medrol Dosepak.  I have also re-mood the prescription for the hydrocodone.  I will see the patient in the office again in 1 week.  If her symptoms begin to worsen in any way, she was encouraged to call the office before hand.    Anu was seen today for wound check.    Diagnoses and all orders for this visit:    Surgical followup visit  -     Discontinue: HYDROcodone-acetaminophen (NORCO) 5-325 MG per tablet; 1-2 tablets prn q 6hrs for moderate to severe pain  -     Discontinue: HYDROcodone-acetaminophen (NORCO) 5-325 MG per tablet; 1 tablet prn q 6 - 8 hrs for moderate to severe pain  -     HYDROcodone-acetaminophen (NORCO) 5-325 MG per tablet; 1 tablet prn q 6 - 8 hrs for moderate to severe pain    Irritant contact dermatitis due to surgical scrub detergent    Right lumbar radiculitis    Constipation due to pain medication    Other orders  -     methylPREDNISolone (MEDROL, VONDA,) 4 MG tablet; follow package directions  -     methylPREDNISolone (MEDROL, VONDA,) 4 MG tablet; follow package directions      Return in about 1 week (around 3/13/2020).

## 2020-03-06 ENCOUNTER — OFFICE VISIT (OUTPATIENT)
Dept: NEUROSURGERY | Facility: CLINIC | Age: 50
End: 2020-03-06

## 2020-03-06 VITALS
HEIGHT: 62 IN | WEIGHT: 160 LBS | BODY MASS INDEX: 29.44 KG/M2 | SYSTOLIC BLOOD PRESSURE: 112 MMHG | DIASTOLIC BLOOD PRESSURE: 83 MMHG | HEART RATE: 87 BPM

## 2020-03-06 DIAGNOSIS — M54.16 RIGHT LUMBAR RADICULITIS: ICD-10-CM

## 2020-03-06 DIAGNOSIS — Z09 SURGICAL FOLLOWUP VISIT: Primary | ICD-10-CM

## 2020-03-06 DIAGNOSIS — L24.0 IRRITANT CONTACT DERMATITIS DUE TO DETERGENT: ICD-10-CM

## 2020-03-06 DIAGNOSIS — K59.03 CONSTIPATION DUE TO PAIN MEDICATION: ICD-10-CM

## 2020-03-06 PROCEDURE — 99024 POSTOP FOLLOW-UP VISIT: CPT | Performed by: NURSE PRACTITIONER

## 2020-03-06 RX ORDER — METHYLPREDNISOLONE 4 MG/1
TABLET ORAL
Qty: 1 EACH | Refills: 0 | Status: SHIPPED | OUTPATIENT
Start: 2020-03-06 | End: 2020-04-14

## 2020-03-06 RX ORDER — HYDROCODONE BITARTRATE AND ACETAMINOPHEN 5; 325 MG/1; MG/1
TABLET ORAL
Qty: 20 TABLET | Refills: 0 | Status: SHIPPED | OUTPATIENT
Start: 2020-03-06 | End: 2020-04-14

## 2020-03-06 RX ORDER — HYDROCODONE BITARTRATE AND ACETAMINOPHEN 5; 325 MG/1; MG/1
TABLET ORAL
Qty: 40 TABLET
Start: 2020-03-06 | End: 2020-03-06 | Stop reason: SDUPTHER

## 2020-03-06 RX ORDER — HYDROCODONE BITARTRATE AND ACETAMINOPHEN 5; 325 MG/1; MG/1
TABLET ORAL
Qty: 40 TABLET
Start: 2020-03-06 | End: 2020-03-06

## 2020-03-09 ENCOUNTER — TELEPHONE (OUTPATIENT)
Dept: NEUROSURGERY | Facility: CLINIC | Age: 50
End: 2020-03-09

## 2020-03-09 ENCOUNTER — PATIENT MESSAGE (OUTPATIENT)
Dept: NEUROSURGERY | Facility: CLINIC | Age: 50
End: 2020-03-09

## 2020-03-10 NOTE — TELEPHONE ENCOUNTER
I believe we did discuss this in the office but I recommend that she walk as much as is possible.  She can continue to sit but she should not sit for longer than 30 to 45 minutes in 1 place.  She needs to get up and walk around some before sitting back down or even lie down for 30 minutes to an hour at least during the day.  Walking is heavily encouraged.  She should hold off on driving till I see her back in the office next time because I gave her a Medrol Dosepak to help with her complaints of significant right leg pain.  She needs the right leg to drive.

## 2020-03-10 NOTE — TELEPHONE ENCOUNTER
----- Message from Leatha Rios MA sent at 3/9/2020  9:12 AM EDT -----  Regarding: FW: Visit Follow-Up Question  Contact: [phone]  You saw her on 03/06/20  ----- Message -----  From: Anu Adam  Sent: 3/6/2020   4:38 PM EDT  To: [facility]  Subject: Visit Follow-Up Question                         Hello,    At my appointment today I forgot to ask if I still have restrictions as in not being able to sit longer than 30 minutes, not able to bend down, and no driving?  Although I do have pain if sitting longer than 30 minutes so I'm guessing that is up to what my ability to do is.     Thank you!  Anu Adam

## 2020-03-11 NOTE — PROGRESS NOTES
"Subjective   Patient ID: nAu Adam is a 50 y.o. female is here today for follow-up wound check. She is 2 weeks out from an right L3-L4 metrx microdiscectomy by Dr. Mcgraw 2/23/20. She is currently taking MDP with mild relief. She states she still has pain in R lower back and leg. The rash around her incision is healing well not as red as before. She takes Hydrocodone 5/325 1-2 times daily for pain.     History of Present Illness     She returns office today for wound check as well as follow-up of ongoing low back and right leg pain.  She is doing better after starting the Medrol Dosepak and does continue to take ibuprofen as needed.  She does continue report some muscle achiness in the right leg but this is a different pain than what she had before surgery.  Overall she is doing really well.  She denies any bowel or bladder issues.  She does continue to take hydrocodone as needed.  She denies any new problems and is not ready to return to work.    She presents with her mother.      /86   Pulse 86   Ht 157.5 cm (62\")   Wt 72.6 kg (160 lb)   LMP 11/16/2013 Comment: patient states her last period was six years ago and is post menopausal   BMI 29.26 kg/m²     The following portions of the patient's history were reviewed and updated as appropriate: allergies, current medications, past family history, past medical history, past social history, past surgical history and problem list.    Review of Systems   Musculoskeletal: Positive for back pain (throbbing ).   Skin: Positive for rash.   Neurological: Positive for weakness (R leg ).       Objective   Physical Exam   Constitutional: She is oriented to person, place, and time. She appears well-developed and well-nourished. She is cooperative.  Non-toxic appearance. She does not have a sickly appearance.   Pleasant well-appearing middle-aged female   HENT:   Head: Normocephalic and atraumatic.   Eyes: EOM are normal.   Neck: Neck supple. No tracheal " deviation present.   Pulmonary/Chest: Effort normal.   Musculoskeletal: She exhibits no deformity.        Lumbar back: She exhibits decreased range of motion, tenderness and pain.   Strength intact bilateral lower extremities  Deferred lumbar range of motion exam   Neurological: She is alert and oriented to person, place, and time. She has normal strength. No sensory deficit. Gait normal. GCS eye subscore is 4. GCS verbal subscore is 5. GCS motor subscore is 6.   Gait is stable and upright   Skin: Skin is warm and dry.   Well-healing midline lumbar incision site, dry surrounding skin but nontender   Psychiatric: She has a normal mood and affect. Her behavior is normal.   Vitals reviewed.    Neurologic Exam     Mental Status   Oriented to person, place, and time.     Cranial Nerves     CN III, IV, VI   Extraocular motions are normal.     Motor Exam     Strength   Strength 5/5 throughout.       Assessment/Plan   Independent Review of Radiographic Studies:    No new imaging      Medical Decision Making:    She returns office today for ongoing follow-up and wound check status post 1 level lumbar decompression.  She is doing well but does have some ongoing achiness in the right leg which I told her can be normal as the nerve is trying to function normally again.  She is getting good innervation into the muscle.  I encouraged daily walking but she is to avoid bending and twisting at the waist for another month.  She is okay to resume driving when she is off narcotics.  She is not ready to return to work being that she is still on narcotics and is having quite a bit of discomfort.  I do suggest that she try physical therapy as this may help with her continued low back and right leg pain.  Okay to lift upwards of 15 pounds.  We will see her back in 1 month for clinical follow-up.  The incision site is healing well and the surrounding skin is mostly normal with the exception of minimal redness.      Plan: Referral to PT,  return to office in 1 month        Anu was seen today for post-op and wound check.    Diagnoses and all orders for this visit:    Acute right-sided low back pain with right-sided sciatica  -     Ambulatory Referral to Physical Therapy Evaluate and treat; Stretching, ROM, Strengthening; Full weight bearing      Return in about 4 weeks (around 4/9/2020).

## 2020-03-12 ENCOUNTER — OFFICE VISIT (OUTPATIENT)
Dept: NEUROSURGERY | Facility: CLINIC | Age: 50
End: 2020-03-12

## 2020-03-12 VITALS
HEART RATE: 86 BPM | DIASTOLIC BLOOD PRESSURE: 86 MMHG | BODY MASS INDEX: 29.44 KG/M2 | WEIGHT: 160 LBS | HEIGHT: 62 IN | SYSTOLIC BLOOD PRESSURE: 130 MMHG

## 2020-03-12 DIAGNOSIS — M54.41 ACUTE RIGHT-SIDED LOW BACK PAIN WITH RIGHT-SIDED SCIATICA: Primary | ICD-10-CM

## 2020-03-12 PROCEDURE — 99024 POSTOP FOLLOW-UP VISIT: CPT | Performed by: NURSE PRACTITIONER

## 2020-03-17 NOTE — PROGRESS NOTES
Subjective   Patient ID: Anu Adam is a 50 y.o. female is here today for post op appt.  Patient had surgery 2.23.20, L3/4 microdiscectomy with metrix    Patient was last seen 3.12.20 for post operative right leg pain    This patient is a little more than 3 weeks out from a right L3-L4 METRx microdiskectomy. She was admitted to the hospital because of severe pain from a right L3-L4 herniated disk with a superior free fragment compressing the L3 root. During the surgery the nerve root was quite irritated, so I am not surprised that she has had some residual pain. She said it is in her right thigh and leg as before. It is not as bad as it was preoperatively but she is still hurting. The contact dermatitis that she had ran its course and the incision looks fine. The steroid pack did not really help all that much. The narcotics which we have given her really do not help all that much, which I am not surprised since this is nerve pain. I told her this is probably postoperative nerve edema and it should get better. I think gabapentin can be helpful and will start her at 300 mg b.i.d. and have her come back in 2 weeks. I told her not to drive yet since she does not feel confident doing that and will keep her off of work. She has a seated job. She is to hold off on any physical therapy right now and see me in 2 weeks. If we need to increase the dose we will. If she does not make much progress after another month or so, then we might need to get an MRI but I am not going to do that right now since she does feel better than prior to surgery.       History of Present Illness    The following portions of the patient's history were reviewed and updated as appropriate: allergies, current medications, past family history, past medical history, past social history, past surgical history and problem list.    Review of Systems   Constitutional: Negative for fever.   Cardiovascular: Negative for chest pain.   Gastrointestinal:  Negative for abdominal pain.   Genitourinary: Negative for dysuria and pelvic pain.   Musculoskeletal: Positive for back pain.   Neurological: Positive for weakness, numbness and headaches.       Objective   Physical Exam   Constitutional: She is oriented to person, place, and time. She appears well-developed and well-nourished.   HENT:   Head: Normocephalic and atraumatic.   Eyes: Pupils are equal, round, and reactive to light. Conjunctivae and EOM are normal.   Fundoscopic exam:       The right eye shows no papilledema. The right eye shows venous pulsations.        The left eye shows no papilledema. The left eye shows venous pulsations.   Neck: Carotid bruit is not present.   Neurological: She is oriented to person, place, and time. She has a normal Finger-Nose-Finger Test and a normal Heel to Shin Test. Gait normal.   Reflex Scores:       Tricep reflexes are 2+ on the right side and 2+ on the left side.       Bicep reflexes are 2+ on the right side and 2+ on the left side.       Brachioradialis reflexes are 2+ on the right side and 2+ on the left side.       Patellar reflexes are 2+ on the right side and 2+ on the left side.       Achilles reflexes are 2+ on the right side and 2+ on the left side.  Psychiatric: Her speech is normal.     Neurologic Exam     Mental Status   Oriented to person, place, and time.   Registration of memory: Good recent and remote memory.   Attention: normal. Concentration: normal.   Speech: speech is normal   Level of consciousness: alert  Knowledge: consistent with education.     Cranial Nerves     CN II   Visual fields full to confrontation.   Visual acuity: normal    CN III, IV, VI   Pupils are equal, round, and reactive to light.  Extraocular motions are normal.     CN V   Facial sensation intact.   Right corneal reflex: normal  Left corneal reflex: normal    CN VII   Facial expression full, symmetric.   Right facial weakness: none  Left facial weakness: none    CN VIII   Hearing:  intact    CN IX, X   Palate: symmetric    CN XI   Right sternocleidomastoid strength: normal  Left sternocleidomastoid strength: normal    CN XII   Tongue: not atrophic  Tongue deviation: none    Motor Exam   Muscle bulk: normal  Right arm tone: normal  Left arm tone: normal  Right leg tone: normal  Left leg tone: normal    Strength   Strength 5/5 except as noted.     Sensory Exam   Light touch normal.     Gait, Coordination, and Reflexes     Gait  Gait: normal    Coordination   Finger to nose coordination: normal  Heel to shin coordination: normal    Reflexes   Right brachioradialis: 2+  Left brachioradialis: 2+  Right biceps: 2+  Left biceps: 2+  Right triceps: 2+  Left triceps: 2+  Right patellar: 2+  Left patellar: 2+  Right achilles: 2+  Left achilles: 2+  Right : 2+  Left : 2+      Assessment/Plan   Independent Review of Radiographic Studies:      Medical Decision Making:    I reassured her that I think this will run its course.  We will start some gabapentin at 300 mg twice daily.  We will keep her off of work.  I told her not to go to therapy and just to do some mild walking on a flat surface.  I will see her in 2 weeks.  We may need to increase the dose.      There are no diagnoses linked to this encounter.  Return in about 2 weeks (around 4/1/2020) for give off work statement.                 Answers for HPI/ROS submitted by the patient on 3/18/2020   Back pain  What is the primary reason for your visit?: Back Pain

## 2020-03-18 ENCOUNTER — OFFICE VISIT (OUTPATIENT)
Dept: NEUROSURGERY | Facility: CLINIC | Age: 50
End: 2020-03-18

## 2020-03-18 VITALS
WEIGHT: 163 LBS | DIASTOLIC BLOOD PRESSURE: 76 MMHG | BODY MASS INDEX: 28.88 KG/M2 | TEMPERATURE: 96.9 F | HEART RATE: 104 BPM | SYSTOLIC BLOOD PRESSURE: 123 MMHG | HEIGHT: 63 IN

## 2020-03-18 DIAGNOSIS — Z09 SURGICAL FOLLOWUP VISIT: Primary | ICD-10-CM

## 2020-03-18 PROCEDURE — 99024 POSTOP FOLLOW-UP VISIT: CPT | Performed by: NEUROLOGICAL SURGERY

## 2020-03-18 RX ORDER — GABAPENTIN 300 MG/1
300 CAPSULE ORAL 2 TIMES DAILY
Qty: 60 CAPSULE | Refills: 3 | Status: SHIPPED | OUTPATIENT
Start: 2020-03-18 | End: 2020-04-01

## 2020-03-20 ENCOUNTER — TELEPHONE (OUTPATIENT)
Dept: GASTROENTEROLOGY | Facility: CLINIC | Age: 50
End: 2020-03-20

## 2020-03-20 ENCOUNTER — TELEPHONE (OUTPATIENT)
Dept: NEUROSURGERY | Facility: CLINIC | Age: 50
End: 2020-03-20

## 2020-03-20 NOTE — TELEPHONE ENCOUNTER
Rescheduled colonoscopy from 4- to 7-3-2020. She has had some health issues. She will arrive at 7 am. New prep sheet mailed. Will update health history closer to time.

## 2020-03-20 NOTE — TELEPHONE ENCOUNTER
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 THEY FAXED OVER MEDICAL PAPERWORK ON 3/13/2020. AND THEY NEED INFO SENT BACK TO SEE HOW MUCH LONGER THIS EMPLOYEE NEEDS TO BE OFF WORK.   THEY NEED THIS INFO SEND BACK AS SOON AS POSSIBLE.  Claim # needs to be attached: [id number]    CONTACT Phone: [phone] ext:[phone]  Fax [phone]

## 2020-04-01 ENCOUNTER — OFFICE VISIT (OUTPATIENT)
Dept: NEUROSURGERY | Facility: CLINIC | Age: 50
End: 2020-04-01

## 2020-04-01 VITALS
TEMPERATURE: 97.9 F | SYSTOLIC BLOOD PRESSURE: 127 MMHG | HEIGHT: 63 IN | BODY MASS INDEX: 28.88 KG/M2 | HEART RATE: 82 BPM | DIASTOLIC BLOOD PRESSURE: 81 MMHG

## 2020-04-01 DIAGNOSIS — Z09 SURGICAL FOLLOWUP VISIT: Primary | ICD-10-CM

## 2020-04-01 PROCEDURE — 99024 POSTOP FOLLOW-UP VISIT: CPT | Performed by: NEUROLOGICAL SURGERY

## 2020-04-01 RX ORDER — GABAPENTIN 600 MG/1
600 TABLET ORAL 2 TIMES DAILY
Qty: 60 TABLET | Refills: 3 | Status: SHIPPED | OUTPATIENT
Start: 2020-04-01 | End: 2020-07-27

## 2020-04-03 ENCOUNTER — TELEPHONE (OUTPATIENT)
Dept: NEUROSURGERY | Facility: CLINIC | Age: 50
End: 2020-04-03

## 2020-04-06 ENCOUNTER — TELEPHONE (OUTPATIENT)
Dept: NEUROSURGERY | Facility: CLINIC | Age: 50
End: 2020-04-06

## 2020-04-06 NOTE — TELEPHONE ENCOUNTER
Spoke with patient and informed her that since her insurance will not cover Voltaren Gel Dr COLINDRES advises that she use OTC Salon Pas.    Patient voiced understanding

## 2020-04-07 NOTE — TELEPHONE ENCOUNTER
Addended by: LAYTON ISRAEL on: 6/12/2019 01:59 PM     Modules accepted: Orders     [facility] pharmacy tech called back and advised that Ms. Adam's insurance will cover Diclofenac solution and wanted to know if we wanted to change to the solutions from Gel?   Per verbal from Dr. Lucien william to change to solutions.    Directions given to [facility] Diclofenac solution 1.5% apply 40 drops to affected area up to 4 times daily # bottles 150 ml with 3 refills. Phone order given to [employer]

## 2020-04-14 ENCOUNTER — TELEMEDICINE (OUTPATIENT)
Dept: FAMILY MEDICINE CLINIC | Facility: TELEHEALTH | Age: 50
End: 2020-04-14

## 2020-04-14 DIAGNOSIS — J01.40 ACUTE NON-RECURRENT PANSINUSITIS: Primary | ICD-10-CM

## 2020-04-14 PROCEDURE — 99213 OFFICE O/P EST LOW 20 MIN: CPT | Performed by: NURSE PRACTITIONER

## 2020-04-14 RX ORDER — AMOXICILLIN AND CLAVULANATE POTASSIUM 875; 125 MG/1; MG/1
1 TABLET, FILM COATED ORAL 2 TIMES DAILY
Qty: 20 TABLET | Refills: 0 | Status: SHIPPED | OUTPATIENT
Start: 2020-04-14 | End: 2020-04-24

## 2020-04-14 NOTE — PROGRESS NOTES
Sinusitis      Subjective   Anu Adam is a 50 y.o. female.     Sinusitis   This is a new problem. Episode onset: more than a week ago. The problem is unchanged. There has been no fever. The pain is moderate. Associated symptoms include congestion (nasal), headaches and sinus pressure. Pertinent negatives include no chills, coughing, diaphoresis, ear pain, hoarse voice, neck pain, shortness of breath, sneezing, sore throat or swollen glands. Treatments tried: Mucinex. The treatment provided no relief.        Patient Active Problem List   Diagnosis   • Mixed anxiety depressive disorder   • Hypercholesterolemia   • Hyperglycemia   • Hypothyroidism   • Chronic non-seasonal allergic rhinitis   • Family history of GI malignancy   • Acute right-sided low back pain with right-sided sciatica       Allergies   Allergen Reactions   • Adhesive Tape Hives and Itching   • Chlorhexidine Dermatitis     Severe erythematous, raised, blistering irritation in perfect square surrounding the surgical incision.         Current Outpatient Medications on File Prior to Visit   Medication Sig Dispense Refill   • estradiol (ESTRACE) 1 MG tablet Take 1 tablet by mouth Daily. 90 tablet 3   • gabapentin (NEURONTIN) 600 MG tablet Take 1 tablet by mouth 2 (Two) Times a Day. 60 tablet 3   • levothyroxine (SYNTHROID) 112 MCG tablet One tablet daily except Wednesday and Sunday when you take 1.5tb 114 tablet 3   • sertraline (ZOLOFT) 100 MG tablet Take 1.5 tablets by mouth Daily. 135 tablet 1   • azelastine (ASTEPRO) 0.15 % solution nasal spray 1 spray into the nostril(s) as directed by provider Daily. 3 each 1   • fluticasone (FLONASE) 50 MCG/ACT nasal spray 2 sprays into the nostril(s) as directed by provider Daily. 3 bottle 1   • [DISCONTINUED] betamethasone valerate (VALISONE) 0.1 % cream Apply sparingly to affected area twice daily and leave open to air. 15 g 0   • [DISCONTINUED] cyclobenzaprine (FLEXERIL) 10 MG tablet Take 1 tablet by mouth 3  (Three) Times a Day As Needed for Muscle Spasms. 15 tablet 0   • [DISCONTINUED] Diclofenac Sodium 1.5 % solution Place 40 drops on the skin as directed by provider 4 (Four) Times a Day As Needed (right side back pain). Apply 40 drops to affected area up to 4 times a day 3 bottle 3   • [DISCONTINUED] HYDROcodone-acetaminophen (NORCO) 5-325 MG per tablet 1 tablet prn q 6 - 8 hrs for moderate to severe pain 20 tablet 0   • [DISCONTINUED] ibuprofen (ADVIL,MOTRIN) 600 MG tablet Take 1 tablet by mouth Every 6 (Six) Hours As Needed for Moderate Pain . 20 tablet 0   • [DISCONTINUED] methylPREDNISolone (MEDROL, VONDA,) 4 MG tablet follow package directions 1 each 0   • [DISCONTINUED] methylPREDNISolone (MEDROL, VONDA,) 4 MG tablet follow package directions 1 each 0     No current facility-administered medications on file prior to visit.        Past Medical History:   Diagnosis Date   • Allergic    • Anxiety    • Depression    • Hormone replacement therapy    • Hypothyroidism    • PONV (postoperative nausea and vomiting)        Past Surgical History:   Procedure Laterality Date   •  SECTION     • CHOLECYSTECTOMY     • LUMBAR DISCECTOMY Right 2020    Procedure: L3 & L4 MICRO DISCECTOMY WITH METRIX;  Surgeon: Edwin Mcgraw MD;  Location: [facility];  Service: Neurosurgery;  Laterality: Right;   • TONSILLECTOMY     • TUBAL ABDOMINAL LIGATION         Family History   Problem Relation Age of Onset   • Cancer Mother    • Heart disease Mother    • Heart disease Father    • Diabetes Father    • Heart disease Brother    • No Known Problems Sister    • No Known Problems Daughter    • No Known Problems Son    • No Known Problems Maternal Grandmother    • No Known Problems Paternal Grandmother    • No Known Problems Maternal Aunt    • No Known Problems Paternal Aunt    • BRCA 1/2 Neg Hx    • Breast cancer Neg Hx    • Colon cancer Neg Hx    • Endometrial cancer Neg Hx    • Ovarian cancer Neg Hx        Social History      Socioeconomic History   • Marital status: Single     Spouse name: Not on file   • Number of children: Not on file   • Years of education: Not on file   • Highest education level: Not on file   Tobacco Use   • Smoking status: Never Smoker   • Smokeless tobacco: Never Used   Substance and Sexual Activity   • Alcohol use: Yes   • Drug use: No   • Sexual activity: Defer       Travel:  No recent travel within the last 21 days outside the [place] Denies recent travel to one of the following [place]  Countries:  [place], [place], [place], or [place].  Denies contact with anyone who has traveled to one of the following [place]  Countries: [place], [place], [place], or [place] within the last 21 days and is known or suspected to have Ebola.  Denies having had any contact with the human remains, blood or any bodily fluids of someone who is known or suspected to have Ebola within the last 21 days.     OB History        1    Para   1    Term   1            AB        Living   1       SAB        TAB        Ectopic        Molar        Multiple        Live Births   1                Review of Systems   Constitutional: Negative for activity change, appetite change, chills, diaphoresis, fatigue and fever.   HENT: Positive for congestion (nasal), postnasal drip, sinus pressure and sinus pain. Negative for ear discharge, ear pain, facial swelling, hoarse voice, rhinorrhea, sneezing, sore throat, trouble swallowing and voice change.    Respiratory: Negative for cough and shortness of breath.    Musculoskeletal: Negative for neck pain.   Neurological: Positive for headaches. Negative for dizziness, facial asymmetry and light-headedness.   All other systems reviewed and are negative.      LMP 2013 Comment: patient states her last period was six years ago and is post menopausal   Breastfeeding No     Objective   Physical Exam   Constitutional: She is oriented to person, place, and time. She appears  well-developed and well-nourished.   HENT:   Head: Normocephalic and atraumatic.   Nose: Right sinus exhibits maxillary sinus tenderness and frontal sinus tenderness. Left sinus exhibits maxillary sinus tenderness and frontal sinus tenderness.   Sinus tenderness per patient report and pointing to location of tenderness, visualized on video    Pulmonary/Chest: Effort normal.   Neurological: She is alert and oriented to person, place, and time.   Psychiatric: She has a normal mood and affect. Her behavior is normal.   *limited physical exam via video visit    Assessment/Plan   Anu was seen today for sinusitis.    Diagnoses and all orders for this visit:    Acute non-recurrent pansinusitis  -     amoxicillin-clavulanate (Augmentin) 875-125 MG per tablet; Take 1 tablet by mouth 2 (Two) Times a Day for 10 days.  -     loratadine-pseudoephedrine (Claritin-D 12 Hour) 5-120 MG per 12 hr tablet; Take 1 tablet by mouth 2 (Two) Times a Day for 10 days.    Plan of care discussed with patient: use Flonase daily for duration of antibiotics; rest, increase fluids, Netti Pot, steam showers, alternate Tylenol and Motrin for pain, complete antibiotics entirely. Follow up with PCP for no improvement in 2-3 days or sooner for new or worsening symptoms. Patient verbalized understanding.    Video visit time spent on this patient approx. 20 mintues    No follow-ups on file.

## 2020-04-14 NOTE — PATIENT INSTRUCTIONS
Plan of care discussed with patient: use Flonase daily for duration of antibiotics; rest, increase fluids, Netti Pot, steam showers, alternate Tylenol and Motrin for pain, complete antibiotics entirely. Follow up with PCP for no improvement in 2-3 days or sooner for new or worsening symptoms.     Sinusitis, Adult  Sinusitis is soreness and swelling (inflammation) of your sinuses. Sinuses are hollow spaces in the bones around your face. They are located:  · Around your eyes.  · In the middle of your forehead.  · Behind your nose.  · In your cheekbones.  Your sinuses and nasal passages are lined with a fluid called mucus. Mucus drains out of your sinuses. Swelling can trap mucus in your sinuses. This lets germs (bacteria, virus, or fungus) grow, which leads to infection. Most of the time, this condition is caused by a virus.  What are the causes?  This condition is caused by:  · Allergies.  · Asthma.  · Germs.  · Things that block your nose or sinuses.  · Growths in the nose (nasal polyps).  · Chemicals or irritants in the air.  · Fungus (rare).  What increases the risk?  You are more likely to develop this condition if:  · You have a weak body defense system (immune system).  · You do a lot of swimming or diving.  · You use nasal sprays too much.  · You smoke.  What are the signs or symptoms?  The main symptoms of this condition are pain and a feeling of pressure around the sinuses. Other symptoms include:  · Stuffy nose (congestion).  · Runny nose (drainage).  · Swelling and warmth in the sinuses.  · Headache.  · Toothache.  · A cough that may get worse at night.  · Mucus that collects in the throat or the back of the nose (postnasal drip).  · Being unable to smell and taste.  · Being very tired (fatigue).  · A fever.  · Sore throat.  · Bad breath.  How is this diagnosed?  This condition is diagnosed based on:  · Your symptoms.  · Your medical history.  · A physical exam.  · Tests to find out if your condition is  short-term (acute) or long-term (chronic). Your doctor may:  ? Check your nose for growths (polyps).  ? Check your sinuses using a tool that has a light (endoscope).  ? Check for allergies or germs.  ? Do imaging tests, such as an MRI or CT scan.  How is this treated?  Treatment for this condition depends on the cause and whether it is short-term or long-term.  · If caused by a virus, your symptoms should go away on their own within 10 days. You may be given medicines to relieve symptoms. They include:  ? Medicines that shrink swollen tissue in the nose.  ? Medicines that treat allergies (antihistamines).  ? A spray that treats swelling of the nostrils.   ? Rinses that help get rid of thick mucus in your nose (nasal saline washes).  · If caused by bacteria, your doctor may wait to see if you will get better without treatment. You may be given antibiotic medicine if you have:  ? A very bad infection.  ? A weak body defense system.  · If caused by growths in the nose, you may need to have surgery.  Follow these instructions at home:  Medicines  · Take, use, or apply over-the-counter and prescription medicines only as told by your doctor. These may include nasal sprays.  · If you were prescribed an antibiotic medicine, take it as told by your doctor. Do not stop taking the antibiotic even if you start to feel better.  Hydrate and humidify    · Drink enough water to keep your pee (urine) pale yellow.  · Use a cool mist humidifier to keep the humidity level in your home above 50%.  · Breathe in steam for 10-15 minutes, 3-4 times a day, or as told by your doctor. You can do this in the bathroom while a hot shower is running.  · Try not to spend time in cool or dry air.  Rest  · Rest as much as you can.  · Sleep with your head raised (elevated).  · Make sure you get enough sleep each night.  General instructions    · Put a warm, moist washcloth on your face 3-4 times a day, or as often as told by your doctor. This will  help with discomfort.  · Wash your hands often with soap and water. If there is no soap and water, use hand .  · Do not smoke. Avoid being around people who are smoking (secondhand smoke).  · Keep all follow-up visits as told by your doctor. This is important.  Contact a doctor if:  · You have a fever.  · Your symptoms get worse.  · Your symptoms do not get better within 10 days.  Get help right away if:  · You have a very bad headache.  · You cannot stop throwing up (vomiting).  · You have very bad pain or swelling around your face or eyes.  · You have trouble seeing.  · You feel confused.  · Your neck is stiff.  · You have trouble breathing.  Summary  · Sinusitis is swelling of your sinuses. Sinuses are hollow spaces in the bones around your face.  · This condition is caused by tissues in your nose that become inflamed or swollen. This traps germs. These can lead to infection.  · If you were prescribed an antibiotic medicine, take it as told by your doctor. Do not stop taking it even if you start to feel better.  · Keep all follow-up visits as told by your doctor. This is important.  This information is not intended to replace advice given to you by your health care provider. Make sure you discuss any questions you have with your health care provider.  Document Released: 06/05/2009 Document Revised: 05/20/2019 Document Reviewed: 05/20/2019  [employer] Patient Education © 2020 [employer]

## 2020-04-30 RX ORDER — SERTRALINE HYDROCHLORIDE 100 MG/1
TABLET, FILM COATED ORAL
Qty: 135 TABLET | Refills: 0 | Status: SHIPPED | OUTPATIENT
Start: 2020-04-30 | End: 2020-05-12 | Stop reason: SDUPTHER

## 2020-05-01 ENCOUNTER — TELEPHONE (OUTPATIENT)
Dept: NEUROSURGERY | Facility: CLINIC | Age: 50
End: 2020-05-01
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
## 2020-05-07 LAB
ALBUMIN SERPL-MCNC: 4.5 G/DL (ref 3.5–5.2)
ALBUMIN/GLOB SERPL: 1.8 G/DL
ALP SERPL-CCNC: 89 U/L (ref 39–117)
ALT SERPL-CCNC: 40 U/L (ref 1–33)
AST SERPL-CCNC: 38 U/L (ref 1–32)
BILIRUB SERPL-MCNC: 0.5 MG/DL (ref 0.2–1.2)
BUN SERPL-MCNC: 15 MG/DL (ref 6–20)
BUN/CREAT SERPL: 21.7 (ref 7–25)
CALCIUM SERPL-MCNC: 9.7 MG/DL (ref 8.6–10.5)
CHLORIDE SERPL-SCNC: 99 MMOL/L (ref 98–107)
CHOLEST SERPL-MCNC: 249 MG/DL (ref 0–200)
CO2 SERPL-SCNC: 27.4 MMOL/L (ref 22–29)
CREAT SERPL-MCNC: 0.69 MG/DL (ref 0.57–1)
GLOBULIN SER CALC-MCNC: 2.5 GM/DL
GLUCOSE SERPL-MCNC: 145 MG/DL (ref 65–99)
HBA1C MFR BLD: 5.91 % (ref 4.8–5.6)
HDLC SERPL-MCNC: 37 MG/DL (ref 40–60)
LDLC SERPL CALC-MCNC: 137 MG/DL (ref 0–100)
LDLC/HDLC SERPL: 3.7 {RATIO}
POTASSIUM SERPL-SCNC: 4.3 MMOL/L (ref 3.5–5.2)
PROT SERPL-MCNC: 7 G/DL (ref 6–8.5)
SODIUM SERPL-SCNC: 140 MMOL/L (ref 136–145)
TRIGL SERPL-MCNC: 375 MG/DL (ref 0–150)
TSH SERPL DL<=0.005 MIU/L-ACNC: 2.63 UIU/ML (ref 0.45–4.5)
VLDLC SERPL CALC-MCNC: 75 MG/DL (ref 5–40)

## 2020-05-11 ENCOUNTER — OFFICE VISIT (OUTPATIENT)
Dept: NEUROSURGERY | Facility: CLINIC | Age: 50
End: 2020-05-11

## 2020-05-11 ENCOUNTER — PATIENT MESSAGE (OUTPATIENT)
Dept: NEUROSURGERY | Facility: CLINIC | Age: 50
End: 2020-05-11

## 2020-05-11 ENCOUNTER — TELEPHONE (OUTPATIENT)
Dept: NEUROSURGERY | Facility: CLINIC | Age: 50
End: 2020-05-11

## 2020-05-11 DIAGNOSIS — M62.830 BACK SPASM: ICD-10-CM

## 2020-05-11 DIAGNOSIS — Z09 SURGICAL FOLLOWUP VISIT: Primary | ICD-10-CM

## 2020-05-11 PROCEDURE — 99024 POSTOP FOLLOW-UP VISIT: CPT | Performed by: NEUROLOGICAL SURGERY
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
## 2020-05-12 ENCOUNTER — OFFICE VISIT (OUTPATIENT)
Dept: INTERNAL MEDICINE | Facility: CLINIC | Age: 50
End: 2020-05-12

## 2020-05-12 VITALS
DIASTOLIC BLOOD PRESSURE: 86 MMHG | HEART RATE: 80 BPM | TEMPERATURE: 98.6 F | HEIGHT: 63 IN | WEIGHT: 168 LBS | BODY MASS INDEX: 29.77 KG/M2 | SYSTOLIC BLOOD PRESSURE: 138 MMHG | OXYGEN SATURATION: 97 %

## 2020-05-12 DIAGNOSIS — R73.01 IFG (IMPAIRED FASTING GLUCOSE): ICD-10-CM

## 2020-05-12 DIAGNOSIS — J30.89 CHRONIC NON-SEASONAL ALLERGIC RHINITIS: ICD-10-CM

## 2020-05-12 DIAGNOSIS — E78.00 HYPERCHOLESTEROLEMIA: ICD-10-CM

## 2020-05-12 DIAGNOSIS — F41.8 MIXED ANXIETY DEPRESSIVE DISORDER: ICD-10-CM

## 2020-05-12 DIAGNOSIS — E03.9 ACQUIRED HYPOTHYROIDISM: Primary | ICD-10-CM

## 2020-05-12 PROCEDURE — 99214 OFFICE O/P EST MOD 30 MIN: CPT | Performed by: FAMILY MEDICINE

## 2020-05-12 RX ORDER — FLUTICASONE PROPIONATE 50 MCG
2 SPRAY, SUSPENSION (ML) NASAL DAILY
Qty: 3 BOTTLE | Refills: 1 | Status: SHIPPED | OUTPATIENT
Start: 2020-05-12

## 2020-05-12 RX ORDER — AZELASTINE HCL 205.5 UG/1
1 SPRAY NASAL DAILY
Qty: 3 EACH | Refills: 1 | Status: SHIPPED | OUTPATIENT
Start: 2020-05-12

## 2020-05-12 RX ORDER — LEVOTHYROXINE SODIUM 112 UG/1
TABLET ORAL
Qty: 114 TABLET | Refills: 3 | Status: SHIPPED | OUTPATIENT
Start: 2020-05-12 | End: 2021-06-30 | Stop reason: SDUPTHER

## 2020-05-12 RX ORDER — SERTRALINE HYDROCHLORIDE 100 MG/1
100 TABLET, FILM COATED ORAL DAILY
Qty: 90 TABLET | Refills: 1 | Status: SHIPPED | OUTPATIENT
Start: 2020-05-12 | End: 2020-11-25 | Stop reason: SDUPTHER

## 2020-05-12 NOTE — PROGRESS NOTES
Subjective   Anu Adam is a 50 y.o. female.   Chief Complaint   Patient presents with   • Hypothyroidism   • Depression   • Allergic Rhinitis   • Hyperglycemia       History of Present Illness     #1 depression with anxiety-patient is on Zoloft 100 mg a day.  She takes it everyday. She reports no side effects. No suicidal ideations, no aggressive behaviors.  PHQ 9 at 5, GAY 7 at 6.  Her son is home from college due to COVID-19.  She enjoys having time with him.  Mood is normal most of the time.  She worries some, but not excessively.     #2 AR- on Flonase and Astepro used as needed.  Symptoms are controlled most of the time.  No side effects.     #3 hypothyroidism- patient is on levothyroxine 112 µg a day.  She takes 1 tablet 5 days a week, 1.5 tablet twice a week.  We adjusted dose at last office visit.  She takes it on empty stomach and does not eat for at least an hour after taking it. TSH 2.63     #4  Hyperglycemia-patient gained 8 pounds from last office visit.  Fasting blood sugar is 145, A1c is at 5.9.  She had a few fasting blood sugar elevated in February, but she was on steroids at that time.    #5 HLP- , triglycerides higher at 375.  She is on no medications for cholesterol.  Mild elevation in LFTs.  Stable.  She had it worked up by GI.    The following portions of the patient's history were reviewed and updated as appropriate: allergies, current medications, past family history, past medical history, past social history, past surgical history and problem list.    Review of Systems   Constitutional: Negative.    Respiratory: Negative.    Cardiovascular: Negative.    Psychiatric/Behavioral: Negative for suicidal ideas.         Objective   Wt Readings from Last 3 Encounters:   05/12/20 76.2 kg (168 lb)   03/18/20 73.9 kg (163 lb)   03/12/20 72.6 kg (160 lb)      Vitals:    05/12/20 0720   BP: 138/86   Pulse: 80   Temp: 98.6 °F (37 °C)   SpO2: 97%     Temp Readings from Last 3 Encounters:    05/12/20 98.6 °F (37 °C)   04/01/20 97.9 °F (36.6 °C) (Tympanic)   03/18/20 96.9 °F (36.1 °C) (Tympanic)     BP Readings from Last 3 Encounters:   05/12/20 138/86   04/01/20 127/81   03/18/20 123/76     Pulse Readings from Last 3 Encounters:   05/12/20 80   04/01/20 82   03/18/20 104     Body mass index is 29.77 kg/m².    Physical Exam   Constitutional: She is oriented to person, place, and time. She appears well-developed and well-nourished.   HENT:   Head: Normocephalic and atraumatic.   Neck: Neck supple. Carotid bruit is not present. No thyromegaly present.   Cardiovascular: Normal rate, regular rhythm and normal heart sounds.   Pulmonary/Chest: Effort normal and breath sounds normal.   Neurological: She is alert and oriented to person, place, and time.   Skin: Skin is warm, dry and intact.   Psychiatric: She has a normal mood and affect. Her behavior is normal.       Assessment/Plan   Anu was seen today for hypothyroidism, depression, allergic rhinitis and hyperglycemia.    Diagnoses and all orders for this visit:    Acquired hypothyroidism    Mixed anxiety depressive disorder    IFG (impaired fasting glucose)  -     Comprehensive Metabolic Panel; Future  -     Hemoglobin A1c; Future    Chronic non-seasonal allergic rhinitis    Hypercholesterolemia  -     Lipid Panel With LDL / HDL Ratio; Future    Other orders  -     levothyroxine (Synthroid) 112 MCG tablet; One tablet daily except Wednesday and Sunday when you take 1.5tb  -     azelastine (ASTEPRO) 0.15 % solution nasal spray; 1 spray into the nostril(s) as directed by provider Daily.  -     fluticasone (FLONASE) 50 MCG/ACT nasal spray; 2 sprays into the nostril(s) as directed by provider Daily.  -     sertraline (ZOLOFT) 100 MG tablet; Take 1 tablet by mouth Daily.        #1 depression with anxiety-continue current treatment.  Follow-up in 6 months.    2.  Impaired fasting glucose-fasting blood sugars in the range of diabetes, A1c in the range of  prediabetes.  Information on prediabetic diet provided.  We will follow-up in 3 months.  Labs before office visit.    3.  Hyperlipidemia- worsening of triglycerides.  She will need to work on decreasing carbs in her diet.  Information on counting carbs provided.  Labs in 3 months before office visit.    4.  Hypothyroidism-continue same.  Follow-up in 12 months.    5.  Allergic rhinitis-continue same.  Follow-up in 6 to 12 months.

## 2020-05-12 NOTE — PATIENT INSTRUCTIONS
Prediabetes Eating Plan  Prediabetes is a condition that causes blood sugar (glucose) levels to be higher than normal. This increases the risk for developing diabetes. In order to prevent diabetes from developing, your health care provider may recommend a diet and other lifestyle changes to help you:  · Control your blood glucose levels.  · Improve your cholesterol levels.  · Manage your blood pressure.  Your health care provider may recommend working with a diet and nutrition specialist (dietitian) to make a meal plan that is best for you.  What are tips for following this plan?  Lifestyle  · Set weight loss goals with the help of your health care team. It is recommended that most people with prediabetes lose 7% of their current body weight.  · Exercise for at least 30 minutes at least 5 days a week.  · Attend a support group or seek ongoing support from a mental health counselor.  · Take over-the-counter and prescription medicines only as told by your health care provider.  Reading food labels  · Read food labels to check the amount of fat, salt (sodium), and sugar in prepackaged foods. Avoid foods that have:  ? Saturated fats.  ? Trans fats.  ? Added sugars.  · Avoid foods that have more than 300 milligrams (mg) of sodium per serving. Limit your daily sodium intake to less than 2,300 mg each day.  Shopping  · Avoid buying pre-made and processed foods.  Cooking  · Cook with olive oil. Do not use butter, lard, or ghee.  · Bake, broil, grill, or boil foods. Avoid frying.  Meal planning    · Work with your dietitian to develop an eating plan that is right for you. This may include:  ? Tracking how many calories you take in. Use a food diary, notebook, or mobile application to track what you eat at each meal.  ? Using the glycemic index (GI) to plan your meals. The index tells you how quickly a food will raise your blood glucose. Choose low-GI foods. These foods take a longer time to raise blood glucose.  · Consider  following a Mediterranean diet. This diet includes:  ? Several servings each day of fresh fruits and vegetables.  ? Eating fish at least twice a week.  ? Several servings each day of whole grains, beans, nuts, and seeds.  ? Using olive oil instead of other fats.  ? Moderate alcohol consumption.  ? Eating small amounts of red meat and whole-fat dairy.  · If you have high blood pressure, you may need to limit your sodium intake or follow a diet such as the DASH eating plan. DASH is an eating plan that aims to lower high blood pressure.  What foods are recommended?  The items listed below may not be a complete list. Talk with your dietitian about what dietary choices are best for you.  Grains  Whole grains, such as whole-wheat or whole-grain breads, crackers, cereals, and pasta. Unsweetened oatmeal. Bulgur. Barley. Quinoa. Brown rice. Corn or whole-wheat flour tortillas or taco shells.  Vegetables  Lettuce. Spinach. Peas. Beets. Cauliflower. Cabbage. Broccoli. Carrots. Tomatoes. Squash. Eggplant. Herbs. Peppers. Onions. Cucumbers. Gaffney sprouts.  Fruits  Berries. Bananas. Apples. Oranges. Grapes. Papaya. [place]. Pomegranate. Kiwi. Grapefruit. Cherries.  Meats and other protein foods  Seafood. Poultry without skin. Lean cuts of pork and beef. Tofu. Eggs. Nuts. Beans.  Dairy  Low-fat or fat-free dairy products, such as yogurt, cottage cheese, and cheese.  Beverages  Water. Tea. Coffee. Sugar-free or diet soda. [place] water. Lowfat or no-fat milk. Milk alternatives, such as soy or almond milk.  Fats and oils  Olive oil. Canola oil. Sunflower oil. Grapeseed oil. Avocado. Walnuts.  Sweets and desserts  Sugar-free or low-fat pudding. Sugar-free or low-fat ice cream and other frozen treats.  Seasoning and other foods  Herbs. Sodium-free spices. Mustard. Relish. Low-fat, low-sugar ketchup. Low-fat, low-sugar barbecue sauce. Low-fat or fat-free mayonnaise.  What foods are not recommended?  The items listed below may not be a  "complete list. Talk with your dietitian about what dietary choices are best for you.  Grains  Refined white flour and flour products, such as bread, pasta, snack foods, and cereals.  Vegetables  Canned vegetables. Frozen vegetables with butter or cream sauce.  Fruits  Fruits canned with syrup.  Meats and other protein foods  Fatty cuts of meat. Poultry with skin. Breaded or fried meat. Processed meats.  Dairy  Full-fat yogurt, cheese, or milk.  Beverages  Sweetened drinks, such as sweet iced tea and soda.  Fats and oils  Butter. Lard. Ghee.  Sweets and desserts  Baked goods, such as cake, cupcakes, pastries, cookies, and cheesecake.  Seasoning and other foods  Spice mixes with added salt. Ketchup. Barbecue sauce. Mayonnaise.  Summary  · To prevent diabetes from developing, you may need to make diet and other lifestyle changes to help control blood sugar, improve cholesterol levels, and manage your blood pressure.  · Set weight loss goals with the help of your health care team. It is recommended that most people with prediabetes lose 7 percent of their current body weight.  · Consider following a Mediterranean diet that includes plenty of fresh fruits and vegetables, whole grains, beans, nuts, seeds, fish, lean meat, low-fat dairy, and healthy oils.  This information is not intended to replace advice given to you by your health care provider. Make sure you discuss any questions you have with your health care provider.  Document Released: 05/03/2016 Document Revised: 02/21/2018 Document Reviewed: 02/21/2018  [employer] Interactive Patient Education © 2020 [employer]  DASH Eating Plan  DASH stands for \"Dietary Approaches to Stop Hypertension.\" The DASH eating plan is a healthy eating plan that has been shown to reduce high blood pressure (hypertension). It may also reduce your risk for type 2 diabetes, heart disease, and stroke. The DASH eating plan may also help with weight loss.  What are tips for following this " "plan?    General guidelines  · Avoid eating more than 2,300 mg (milligrams) of salt (sodium) a day. If you have hypertension, you may need to reduce your sodium intake to 1,500 mg a day.  · Limit alcohol intake to no more than 1 drink a day for nonpregnant women and 2 drinks a day for men. One drink equals 12 oz of beer, 5 oz of wine, or 1½ oz of hard liquor.  · Work with your health care provider to maintain a healthy body weight or to lose weight. Ask what an ideal weight is for you.  · Get at least 30 minutes of exercise that causes your heart to beat faster (aerobic exercise) most days of the week. Activities may include walking, swimming, or biking.  · Work with your health care provider or diet and nutrition specialist (dietitian) to adjust your eating plan to your individual calorie needs.  Reading food labels    · Check food labels for the amount of sodium per serving. Choose foods with less than 5 percent of the Daily Value of sodium. Generally, foods with less than 300 mg of sodium per serving fit into this eating plan.  · To find whole grains, look for the word \"whole\" as the first word in the ingredient list.  Shopping  · Buy products labeled as \"low-sodium\" or \"no salt added.\"  · Buy fresh foods. Avoid canned foods and premade or frozen meals.  Cooking  · Avoid adding salt when cooking. Use salt-free seasonings or herbs instead of table salt or sea salt. Check with your health care provider or pharmacist before using salt substitutes.  · Do not chaudhry foods. Cook foods using healthy methods such as baking, boiling, grilling, and broiling instead.  · Cook with heart-healthy oils, such as olive, canola, soybean, or sunflower oil.  Meal planning  · Eat a balanced diet that includes:  ? 5 or more servings of fruits and vegetables each day. At each meal, try to fill half of your plate with fruits and vegetables.  ? Up to 6-8 servings of whole grains each day.  ? Less than 6 oz of lean meat, poultry, or fish " each day. A 3-oz serving of meat is about the same size as a deck of cards. One egg equals 1 oz.  ? 2 servings of low-fat dairy each day.  ? A serving of nuts, seeds, or beans 5 times each week.  ? Heart-healthy fats. Healthy fats called Omega-3 fatty acids are found in foods such as flaxseeds and coldwater fish, like sardines, salmon, and mackerel.  · Limit how much you eat of the following:  ? Canned or prepackaged foods.  ? Food that is high in trans fat, such as fried foods.  ? Food that is high in saturated fat, such as fatty meat.  ? Sweets, desserts, sugary drinks, and other foods with added sugar.  ? Full-fat dairy products.  · Do not salt foods before eating.  · Try to eat at least 2 vegetarian meals each week.  · Eat more home-cooked food and less restaurant, buffet, and fast food.  · When eating at a restaurant, ask that your food be prepared with less salt or no salt, if possible.  What foods are recommended?  The items listed may not be a complete list. Talk with your dietitian about what dietary choices are best for you.  Grains  Whole-grain or whole-wheat bread. Whole-grain or whole-wheat pasta. Brown rice. Oatmeal. Quinoa. Bulgur. Whole-grain and low-sodium cereals. [place] bread. Low-fat, low-sodium crackers. Whole-wheat flour tortillas.  Vegetables  Fresh or frozen vegetables (raw, steamed, roasted, or grilled). Low-sodium or reduced-sodium tomato and vegetable juice. Low-sodium or reduced-sodium tomato sauce and tomato paste. Low-sodium or reduced-sodium canned vegetables.  Fruits  All fresh, dried, or frozen fruit. Canned fruit in natural juice (without added sugar).  Meat and other protein foods  Skinless chicken or turkey. Ground chicken or turkey. Pork with fat trimmed off. Fish and seafood. Egg whites. Dried beans, peas, or lentils. Unsalted nuts, nut butters, and seeds. Unsalted canned beans. Lean cuts of beef with fat trimmed off. Low-sodium, lean deli meat.  Dairy  Low-fat (1%) or fat-free  (skim) milk. Fat-free, low-fat, or reduced-fat cheeses. Nonfat, low-sodium ricotta or cottage cheese. Low-fat or nonfat yogurt. Low-fat, low-sodium cheese.  Fats and oils  Soft margarine without trans fats. Vegetable oil. Low-fat, reduced-fat, or light mayonnaise and salad dressings (reduced-sodium). Canola, safflower, olive, soybean, and sunflower oils. Avocado.  Seasoning and other foods  Herbs. Spices. Seasoning mixes without salt. Unsalted popcorn and pretzels. Fat-free sweets.  What foods are not recommended?  The items listed may not be a complete list. Talk with your dietitian about what dietary choices are best for you.  Grains  Baked goods made with fat, such as croissants, muffins, or some breads. Dry pasta or rice meal packs.  Vegetables  Creamed or fried vegetables. Vegetables in a cheese sauce. Regular canned vegetables (not low-sodium or reduced-sodium). Regular canned tomato sauce and paste (not low-sodium or reduced-sodium). Regular tomato and vegetable juice (not low-sodium or reduced-sodium). Pickles. Olives.  Fruits  Canned fruit in a light or heavy syrup. Fried fruit. Fruit in cream or butter sauce.  Meat and other protein foods  Fatty cuts of meat. Ribs. Fried meat. [place]. Sausage. Bologna and other processed lunch meats. Salami. Fatback. Hotdogs. Bratwurst. Salted nuts and seeds. Canned beans with added salt. Canned or smoked fish. Whole eggs or egg yolks. Chicken or turkey with skin.  Dairy  Whole or 2% milk, cream, and half-and-half. Whole or full-fat cream cheese. Whole-fat or sweetened yogurt. Full-fat cheese. Nondairy creamers. Whipped toppings. Processed cheese and cheese spreads.  Fats and oils  Butter. Stick margarine. Lard. Shortening. Ghee. [place] fat. Tropical oils, such as coconut, palm kernel, or palm oil.  Seasoning and other foods  Salted popcorn and pretzels. Onion salt, garlic salt, seasoned salt, table salt, and sea salt. [facility] sauce. [place] sauce. [facility] sauce.  Teriyaki sauce. Soy sauce, including reduced-sodium. Steak sauce. Canned and packaged gravies. Fish sauce. Oyster sauce. Cocktail sauce. Horseradish that you find on the shelf. Ketchup. Mustard. Meat flavorings and tenderizers. Bouillon cubes. Hot sauce and Tabasco sauce. Premade or packaged marinades. Premade or packaged taco seasonings. Relishes. Regular salad dressings.  Where to find more information:  · National Heart, Lung, and Blood [place]: www.nhlbi.nih.gov  · American Heart Association: www.heart.org  Summary  · The DASH eating plan is a healthy eating plan that has been shown to reduce high blood pressure (hypertension). It may also reduce your risk for type 2 diabetes, heart disease, and stroke.  · With the DASH eating plan, you should limit salt (sodium) intake to 2,300 mg a day. If you have hypertension, you may need to reduce your sodium intake to 1,500 mg a day.  · When on the DASH eating plan, aim to eat more fresh fruits and vegetables, whole grains, lean proteins, low-fat dairy, and heart-healthy fats.  · Work with your health care provider or diet and nutrition specialist (dietitian) to adjust your eating plan to your individual calorie needs.  This information is not intended to replace advice given to you by your health care provider. Make sure you discuss any questions you have with your health care provider.  Document Released: 12/06/2012 Document Revised: 12/11/2017 Document Reviewed: 12/11/2017  [employer] Interactive Patient Education © 2020 [employer]    Carbohydrate Counting for Diabetes Mellitus, Adult    Carbohydrate counting is a method of keeping track of how many carbohydrates you eat. Eating carbohydrates naturally increases the amount of sugar (glucose) in the blood. Counting how many carbohydrates you eat helps keep your blood glucose within normal limits, which helps you manage your diabetes (diabetes mellitus).  It is important to know how many carbohydrates you can safely  "have in each meal. This is different for every person. A diet and nutrition specialist (registered dietitian) can help you make a meal plan and calculate how many carbohydrates you should have at each meal and snack.  Carbohydrates are found in the following foods:  · Grains, such as breads and cereals.  · Dried beans and soy products.  · Starchy vegetables, such as potatoes, peas, and corn.  · Fruit and fruit juices.  · Milk and yogurt.  · Sweets and snack foods, such as cake, cookies, candy, chips, and soft drinks.  How do I count carbohydrates?  There are two ways to count carbohydrates in food. You can use either of the methods or a combination of both.  Reading \"Nutrition Facts\" on packaged food  The \"Nutrition Facts\" list is included on the labels of almost all packaged foods and beverages in the [place] It includes:  · The serving size.  · Information about nutrients in each serving, including the grams (g) of carbohydrate per serving.  To use the “Nutrition Facts\":  · Decide how many servings you will have.  · Multiply the number of servings by the number of carbohydrates per serving.  · The resulting number is the total amount of carbohydrates that you will be having.  Learning standard serving sizes of other foods  When you eat carbohydrate foods that are not packaged or do not include \"Nutrition Facts\" on the label, you need to measure the servings in order to count the amount of carbohydrates:  · Measure the foods that you will eat with a food scale or measuring cup, if needed.  · Decide how many standard-size servings you will eat.  · Multiply the number of servings by 15. Most carbohydrate-rich foods have about 15 g of carbohydrates per serving.  ? For example, if you eat 8 oz (170 g) of strawberries, you will have eaten 2 servings and 30 g of carbohydrates (2 servings x 15 g = 30 g).  · For foods that have more than one food mixed, such as soups and casseroles, you must count the carbohydrates in each " food that is included.  The following list contains standard serving sizes of common carbohydrate-rich foods. Each of these servings has about 15 g of carbohydrates:  · ½ hamburger bun or ½ English muffin.  · ½ oz (15 mL) syrup.  · ½ oz (14 g) jelly.  · 1 slice of bread.  · 1 six-inch tortilla.  · 3 oz (85 g) cooked rice or pasta.  · 4 oz (113 g) cooked dried beans.  · 4 oz (113 g) starchy vegetable, such as peas, corn, or potatoes.  · 4 oz (113 g) hot cereal.  · 4 oz (113 g) mashed potatoes or ¼ of a large baked potato.  · 4 oz (113 g) canned or frozen fruit.  · 4 oz (120 mL) fruit juice.  · 4-6 crackers.  · 6 chicken nuggets.  · 6 oz (170 g) unsweetened dry cereal.  · 6 oz (170 g) plain fat-free yogurt or yogurt sweetened with artificial sweeteners.  · 8 oz (240 mL) milk.  · 8 oz (170 g) fresh fruit or one small piece of fruit.  · 24 oz (680 g) popped popcorn.  Example of carbohydrate counting  Sample meal  · 3 oz (85 g) chicken breast.  · 6 oz (170 g) brown rice.  · 4 oz (113 g) corn.  · 8 oz (240 mL) milk.  · 8 oz (170 g) strawberries with sugar-free whipped topping.  Carbohydrate calculation  1. Identify the foods that contain carbohydrates:  ? Rice.  ? Corn.  ? Milk.  ? Strawberries.  2. Calculate how many servings you have of each food:  ? 2 servings rice.  ? 1 serving corn.  ? 1 serving milk.  ? 1 serving strawberries.  3. Multiply each number of servings by 15 g:  ? 2 servings rice x 15 g = 30 g.  ? 1 serving corn x 15 g = 15 g.  ? 1 serving milk x 15 g = 15 g.  ? 1 serving strawberries x 15 g = 15 g.  4. Add together all of the amounts to find the total grams of carbohydrates eaten:  ? 30 g + 15 g + 15 g + 15 g = 75 g of carbohydrates total.  Summary  · Carbohydrate counting is a method of keeping track of how many carbohydrates you eat.  · Eating carbohydrates naturally increases the amount of sugar (glucose) in the blood.  · Counting how many carbohydrates you eat helps keep your blood glucose within  normal limits, which helps you manage your diabetes.  · A diet and nutrition specialist (registered dietitian) can help you make a meal plan and calculate how many carbohydrates you should have at each meal and snack.  This information is not intended to replace advice given to you by your health care provider. Make sure you discuss any questions you have with your health care provider.  Document Released: 12/18/2006 Document Revised: 01/14/2020 Document Reviewed: 05/31/2017  [employer] Interactive Patient Education © 2020 [employer]

## 2020-05-19 ENCOUNTER — TELEPHONE (OUTPATIENT)
Dept: GASTROENTEROLOGY | Facility: CLINIC | Age: 50
End: 2020-05-19

## 2020-05-27 ENCOUNTER — TELEPHONE (OUTPATIENT)
Dept: OBSTETRICS AND GYNECOLOGY | Age: 50
End: 2020-05-27

## 2020-05-27 RX ORDER — MEDROXYPROGESTERONE ACETATE 2.5 MG/1
2.5 TABLET ORAL DAILY
Qty: 30 TABLET | Refills: 2 | Status: SHIPPED | OUTPATIENT
Start: 2020-05-27 | End: 2020-08-10

## 2020-05-27 RX ORDER — ESTRADIOL 1 MG/1
1 TABLET ORAL DAILY
Qty: 90 TABLET | Refills: 3 | Status: SHIPPED | OUTPATIENT
Start: 2020-05-27 | End: 2020-12-01 | Stop reason: SDUPTHER

## 2020-05-27 NOTE — TELEPHONE ENCOUNTER
I sent in the refill for the pt. Is she taking a progesterone pill as well?  I can't see that she had a hysterectomy so if she is taking estrogen and has a uterus, she should be taking progesterone as well

## 2020-05-27 NOTE — TELEPHONE ENCOUNTER
Pt of Dr. Monzon called in to request a refill Rx for Estradiol be sent in to [facility].     PT NOTIFIED

## 2020-05-27 NOTE — TELEPHONE ENCOUNTER
Pt called back. Stated she started off taken the progesterone then Dr Monzon switched her to estradiol. Pt confirmed that she stopped taking the progesterone at that time and was only taking he estradiol only.  Pt will need a new Rx for progesterone, the pills she has left  back in 2018.

## 2020-06-01 NOTE — PROGRESS NOTES
Subjective   History of Present Illness: Anu Adam is a 50 y.o. female for televisit follow up appt after PT/dry needling. Patient had surgery 2.23.20  L3/4 microdiscectomy    Patient was last seen 5.11.20 for post op residual right leg and low back pain, right leg weakness and numbness.  She was referred to PT and returned to work 5.26.20 with the following restrictions, no repetitive bending or twisting, no prolonged sitting or standing and no lifting greater than 10 pounds, she does work from home.    Patient contacted office on 6.1.20 stating that she was having increased back and leg pain since 5.29.20    You have chosen to receive care through a telephone visit. Do you consent to use a telephone visit for your medical care today? Yes, she is working from home    This was a televisit. I was calling from the hospital. The patient was at home. The visit lasted 6 minutes. She went to PT to get dry needling and it really has not helped but in fact, she began developing some radiating recurrent right buttock and leg pain right before the surgery. We have not gotten another MRI because she has been having mostly paravertebral spasm and that is what the dry needling was for. She said her pain is excruciating and she feels like she was before the surgery about 3 months ago, so we will go ahead and repeat the lumbar MRI and have her come back to discuss it.       Back Pain   The problem occurs constantly. The problem is unchanged. The pain is present in the lumbar spine. The pain radiates to the right thigh. The pain is at a severity of 7/10. The pain is moderate. The symptoms are aggravated by position. Associated symptoms include leg pain. Pertinent negatives include no numbness or weakness.   Leg Pain    The pain is present in the right leg. The pain is at a severity of 6/10. The pain is moderate. The pain has been worsening since onset. Pertinent negatives include no numbness.       The following portions of the  patient's history were reviewed and updated as appropriate: allergies, current medications, past family history, past medical history, past social history, past surgical history and problem list.    Review of Systems   Constitutional: Negative.    HENT: Negative.    Eyes: Negative.    Respiratory: Negative.    Cardiovascular: Negative.    Gastrointestinal: Negative.    Endocrine: Negative.    Genitourinary: Negative for urgency.   Musculoskeletal: Positive for back pain. Negative for arthralgias, gait problem, joint swelling and myalgias.   Allergic/Immunologic: Negative.    Neurological: Negative for weakness and numbness.   Hematological: Negative.    Psychiatric/Behavioral: Negative.        Objective             Physical Exam   Deferred  Neurologic Exam   Deferred        Assessment/Plan   Independent Review of Radiographic Studies:      I personally reviewed the images from the following studies.    In the initial MRI done on 2/20/2020 showed a fairly substantial right L3-L4 herniated disc.  Agree with the report.        Medical Decision Making:      We will go ahead and order another MRI with and without contrast and have her come to the office to discuss it with me.      Anu was seen today for back pain and leg pain.    Diagnoses and all orders for this visit:    Back spasm  -     MRI Lumbar Spine With & Without Contrast; Future    Herniation of lumbar intervertebral disc with radiculopathy  -     MRI Lumbar Spine With & Without Contrast; Future      Return in about 8 days (around 6/10/2020) for After the MRI.

## 2020-06-02 ENCOUNTER — OFFICE VISIT (OUTPATIENT)
Dept: NEUROSURGERY | Facility: CLINIC | Age: 50
End: 2020-06-02

## 2020-06-02 DIAGNOSIS — M62.830 BACK SPASM: Primary | ICD-10-CM

## 2020-06-02 DIAGNOSIS — M51.16 HERNIATION OF LUMBAR INTERVERTEBRAL DISC WITH RADICULOPATHY: ICD-10-CM

## 2020-06-02 PROCEDURE — 99213 OFFICE O/P EST LOW 20 MIN: CPT | Performed by: NEUROLOGICAL SURGERY

## 2020-06-03 RX ORDER — DIAZEPAM 5 MG/1
TABLET ORAL
Qty: 2 TABLET | Refills: 0 | Status: SHIPPED | OUTPATIENT
Start: 2020-06-03 | End: 2020-08-14

## 2020-06-10 ENCOUNTER — TELEPHONE (OUTPATIENT)
Dept: NEUROSURGERY | Facility: CLINIC | Age: 50
End: 2020-06-10

## 2020-06-10 RX ORDER — OXYCODONE AND ACETAMINOPHEN 7.5; 325 MG/1; MG/1
TABLET ORAL
Qty: 1 TABLET | Refills: 0 | Status: SHIPPED | OUTPATIENT
Start: 2020-06-10 | End: 2020-08-14

## 2020-06-11 ENCOUNTER — OFFICE VISIT (OUTPATIENT)
Dept: NEUROSURGERY | Facility: CLINIC | Age: 50
End: 2020-06-11

## 2020-06-11 ENCOUNTER — HOSPITAL ENCOUNTER (OUTPATIENT)
Dept: MRI IMAGING | Facility: HOSPITAL | Age: 50
Discharge: HOME | End: 2020-06-11
Admitting: NEUROLOGICAL SURGERY

## 2020-06-11 DIAGNOSIS — M62.830 BACK SPASM: ICD-10-CM

## 2020-06-11 DIAGNOSIS — M51.16 HERNIATION OF LUMBAR INTERVERTEBRAL DISC WITH RADICULOPATHY: ICD-10-CM

## 2020-06-11 DIAGNOSIS — M51.36 DDD (DEGENERATIVE DISC DISEASE), LUMBAR: ICD-10-CM

## 2020-06-11 DIAGNOSIS — M62.830 BACK SPASM: Primary | ICD-10-CM

## 2020-06-11 PROBLEM — M51.369 DDD (DEGENERATIVE DISC DISEASE), LUMBAR: Status: ACTIVE | Noted: 2020-06-11

## 2020-06-11 PROCEDURE — 72158 MRI LUMBAR SPINE W/O & W/DYE: CPT

## 2020-06-11 PROCEDURE — 82565 ASSAY OF CREATININE: CPT

## 2020-06-11 PROCEDURE — 99213 OFFICE O/P EST LOW 20 MIN: CPT | Performed by: NEUROLOGICAL SURGERY

## 2020-06-11 PROCEDURE — A9577 INJ MULTIHANCE: HCPCS | Performed by: NEUROLOGICAL SURGERY

## 2020-06-11 PROCEDURE — 0 GADOBENATE DIMEGLUMINE 529 MG/ML SOLUTION: Performed by: NEUROLOGICAL SURGERY

## 2020-06-11 RX ORDER — TIZANIDINE 2 MG/1
2 TABLET ORAL EVERY 8 HOURS PRN
Qty: 90 TABLET | Refills: 3 | Status: SHIPPED | OUTPATIENT
Start: 2020-06-11 | End: 2021-04-05 | Stop reason: SDUPTHER

## 2020-06-11 RX ADMIN — GADOBENATE DIMEGLUMINE 15 ML: 529 INJECTION, SOLUTION INTRAVENOUS at 06:59

## 2020-06-11 NOTE — PROGRESS NOTES
Subjective   History of Present Illness: Anu Adam is a 50 y.o. female for televisit follow up to discuss lumbar MRI results done today at [facility].  Patient had surgery 2.23.20, L3/4 microdiscectomy    Patient had televisit on 6.2.20 for post op residual low back and right leg pain    She is taking Gabapentin 600 mg BID    You have chosen to receive care through a telephone visit. Do you consent to use a telephone visit for your medical care today? Yes, she is at home    This was a televisit from my office. She was at home. It lasted 10 minutes. We reviewed her MRI, which showed complete removal of the superior free fragment. Obviously nothing reoperative. The leg pain is just pain from a  inflamed nerve. The back spasms are the same. I explained this to her. Nothing reoperative. She does not want to take narcotics. I did not recommend it. She will not then be sent to pain management. We will stick with our plan and then she will continue therapy getting dry needling and exercises. I told her to speak to the therapist at a TENS unit. She is taking gabapentin at 600 mg b.i.d. and will try some tizanidine. We will see her as a face to face visit in 1 month.      History of Present Illness    The following portions of the patient's history were reviewed and updated as appropriate: allergies, current medications, past family history, past medical history, past social history, past surgical history and problem list.    Review of Systems   Constitutional: Negative.    HENT: Negative.    Eyes: Negative.    Respiratory: Negative.    Cardiovascular: Negative.    Gastrointestinal: Negative.    Endocrine: Negative.    Genitourinary: Positive for urgency.   Musculoskeletal: Positive for back pain. Negative for arthralgias, gait problem, joint swelling and myalgias.   Allergic/Immunologic: Negative.    Neurological: Negative for weakness and numbness.   Hematological: Negative.    Psychiatric/Behavioral: Negative.         Objective             Physical Exam     Deferred    Neurological Exam    Deferred        Assessment/Plan   Independent Review of Radiographic Studies:      I personally reviewed the images from the following studies.    The MRI done today on 6/11/2020 actually looks quite good.  The herniated disc fragment above the L3-L4 disc base has been completely removed.  The discs are well-hydrated with no nerve root compression.  Agree with the report.        Medical Decision Making:      She does not want to take narcotics so we will not refer her to pain management.  We will try some Tizanidine and she will continue her gabapentin and continue with therapy.  I think she should continue dry needling, exercise and asked the therapist about a TENS unit.  We can give 1 to her as a prescription if it helps during physical therapy.  I will see her in 1 month.      Anu was seen today for back pain and leg pain.    Diagnoses and all orders for this visit:    Back spasm    DDD (degenerative disc disease), lumbar    Other orders  -     tiZANidine (ZANAFLEX) 2 MG tablet; Take 1 tablet by mouth Every 8 (Eight) Hours As Needed for Muscle Spasms.      Return in about 4 weeks (around 7/9/2020).

## 2020-06-15 LAB — CREAT BLDA-MCNC: 0.7 MG/DL (ref 0.6–1.3)

## 2020-07-02 ENCOUNTER — TRANSCRIBE ORDERS (OUTPATIENT)
Dept: SLEEP MEDICINE | Facility: HOSPITAL | Age: 50
End: 2020-07-02

## 2020-07-02 DIAGNOSIS — Z01.818 OTHER SPECIFIED PRE-OPERATIVE EXAMINATION: Primary | ICD-10-CM

## 2020-07-08 ENCOUNTER — OFFICE VISIT (OUTPATIENT)
Dept: NEUROSURGERY | Facility: CLINIC | Age: 50
End: 2020-07-08

## 2020-07-08 VITALS
BODY MASS INDEX: 29.77 KG/M2 | TEMPERATURE: 97.9 F | DIASTOLIC BLOOD PRESSURE: 78 MMHG | HEIGHT: 63 IN | HEART RATE: 74 BPM | SYSTOLIC BLOOD PRESSURE: 145 MMHG

## 2020-07-08 DIAGNOSIS — Z98.890 HISTORY OF SPINAL SURGERY: ICD-10-CM

## 2020-07-08 DIAGNOSIS — M51.36 DDD (DEGENERATIVE DISC DISEASE), LUMBAR: ICD-10-CM

## 2020-07-08 DIAGNOSIS — M62.830 BACK SPASM: Primary | ICD-10-CM

## 2020-07-08 PROCEDURE — 99213 OFFICE O/P EST LOW 20 MIN: CPT | Performed by: NEUROLOGICAL SURGERY

## 2020-07-14 ENCOUNTER — LAB (OUTPATIENT)
Dept: LAB | Facility: HOSPITAL | Age: 50
End: 2020-07-14

## 2020-07-14 DIAGNOSIS — Z01.818 OTHER SPECIFIED PRE-OPERATIVE EXAMINATION: ICD-10-CM

## 2020-07-14 PROCEDURE — C9803 HOPD COVID-19 SPEC COLLECT: HCPCS

## 2020-07-14 PROCEDURE — U0004 COV-19 TEST NON-CDC HGH THRU: HCPCS

## 2020-07-15 LAB
REF LAB TEST METHOD: NORMAL
SARS-COV-2 RNA RESP QL NAA+PROBE: NOT DETECTED

## 2020-07-16 ENCOUNTER — ANESTHESIA (OUTPATIENT)
Dept: GASTROENTEROLOGY | Facility: HOSPITAL | Age: 50
End: 2020-07-16

## 2020-07-16 ENCOUNTER — ANESTHESIA EVENT (OUTPATIENT)
Dept: GASTROENTEROLOGY | Facility: HOSPITAL | Age: 50
End: 2020-07-16

## 2020-07-16 ENCOUNTER — HOSPITAL ENCOUNTER (OUTPATIENT)
Facility: HOSPITAL | Age: 50
Setting detail: HOSPITAL OUTPATIENT SURGERY
Discharge: HOME | End: 2020-07-16
Attending: INTERNAL MEDICINE | Admitting: INTERNAL MEDICINE

## 2020-07-16 VITALS
OXYGEN SATURATION: 95 % | HEART RATE: 83 BPM | RESPIRATION RATE: 18 BRPM | SYSTOLIC BLOOD PRESSURE: 125 MMHG | BODY MASS INDEX: 29.06 KG/M2 | DIASTOLIC BLOOD PRESSURE: 96 MMHG | TEMPERATURE: 97.6 F | WEIGHT: 164 LBS

## 2020-07-16 DIAGNOSIS — Z80.0 FAMILY HISTORY OF GI MALIGNANCY: ICD-10-CM

## 2020-07-16 PROCEDURE — 25010000002 PROPOFOL 10 MG/ML EMULSION: Performed by: NURSE ANESTHETIST, CERTIFIED REGISTERED

## 2020-07-16 PROCEDURE — 45378 DIAGNOSTIC COLONOSCOPY: CPT | Performed by: INTERNAL MEDICINE

## 2020-07-16 RX ORDER — PROPOFOL 10 MG/ML
VIAL (ML) INTRAVENOUS AS NEEDED
Status: DISCONTINUED | OUTPATIENT
Start: 2020-07-16 | End: 2020-07-16 | Stop reason: SURG

## 2020-07-16 RX ORDER — PROPOFOL 10 MG/ML
VIAL (ML) INTRAVENOUS CONTINUOUS PRN
Status: DISCONTINUED | OUTPATIENT
Start: 2020-07-16 | End: 2020-07-16 | Stop reason: SURG

## 2020-07-16 RX ORDER — SODIUM CHLORIDE, SODIUM LACTATE, POTASSIUM CHLORIDE, CALCIUM CHLORIDE 600; 310; 30; 20 MG/100ML; MG/100ML; MG/100ML; MG/100ML
30 INJECTION, SOLUTION INTRAVENOUS CONTINUOUS
Status: DISCONTINUED | OUTPATIENT
Start: 2020-07-16 | End: 2020-07-16 | Stop reason: HOSPADM

## 2020-07-16 RX ORDER — LIDOCAINE HYDROCHLORIDE 20 MG/ML
INJECTION, SOLUTION INFILTRATION; PERINEURAL AS NEEDED
Status: DISCONTINUED | OUTPATIENT
Start: 2020-07-16 | End: 2020-07-16 | Stop reason: SURG

## 2020-07-16 RX ADMIN — SODIUM CHLORIDE, POTASSIUM CHLORIDE, SODIUM LACTATE AND CALCIUM CHLORIDE 30 ML/HR: 600; 310; 30; 20 INJECTION, SOLUTION INTRAVENOUS at 08:27

## 2020-07-16 RX ADMIN — PROPOFOL 300 MCG/KG/MIN: 10 INJECTION, EMULSION INTRAVENOUS at 09:20

## 2020-07-16 RX ADMIN — LIDOCAINE HYDROCHLORIDE 100 MG: 20 INJECTION, SOLUTION INFILTRATION; PERINEURAL at 09:20

## 2020-07-16 RX ADMIN — PROPOFOL 100 MG: 10 INJECTION, EMULSION INTRAVENOUS at 09:20

## 2020-07-16 NOTE — ANESTHESIA POSTPROCEDURE EVALUATION
Patient: Anu Adam    Procedure Summary     Date:  07/16/20 Room / Location:   [facility]    Anesthesia Start:  0915 Anesthesia Stop:  0937    Procedure:  COLONOSCOPY (N/A ) Diagnosis:       Family history of GI malignancy      (Family history of GI malignancy [Z80.0])    Surgeon:  Emory Acosta MD Provider:  Diego Menendez MD    Anesthesia Type:  MAC ASA Status:  2          Anesthesia Type: MAC    Vitals  Vitals Value Taken Time   /96 7/16/2020  9:59 AM   Temp     Pulse 83 7/16/2020  9:59 AM   Resp 18 7/16/2020  9:59 AM   SpO2 95 % 7/16/2020  9:59 AM           Post Anesthesia Care and Evaluation    Patient location during evaluation: PACU  Patient participation: complete - patient participated  Level of consciousness: awake  Pain score: 1  Pain management: adequate  Airway patency: patent  Anesthetic complications: No anesthetic complications  PONV Status: none  Cardiovascular status: acceptable  Respiratory status: acceptable  Hydration status: acceptable

## 2020-07-16 NOTE — ANESTHESIA PREPROCEDURE EVALUATION
Anesthesia Evaluation     Patient summary reviewed   history of anesthetic complications: PONV               Airway   Mallampati: II  No difficulty expected  Dental      Pulmonary    Cardiovascular     Rhythm: regular        Neuro/Psych  (+) psychiatric history,     GI/Hepatic/Renal/Endo      Musculoskeletal     Abdominal    Substance History      OB/GYN          Other   arthritis,                      Anesthesia Plan    ASA 2     MAC       Anesthetic plan, all risks, benefits, and alternatives have been provided, discussed and informed consent has been obtained with: patient.

## 2020-07-16 NOTE — DISCHARGE INSTRUCTIONS
For the next 24 hours patient needs to be with a responsible adult.    For 24 hours DO NOT drive, operate machinery, appliances, drink alcohol, make important decisions or sign legal documents.    Start with a light or bland diet if you are feeling sick to your stomach otherwise advance to regular diet as tolerated.    Follow recommendations on procedure report if provided by your doctor.    Call Dr. Acosta for problems [phone]    Problems may include but not limited to: large amounts of bleeding, trouble breathing, repeated vomiting, severe unrelieved pain, fever or chills.

## 2020-07-16 NOTE — H&P
CC: Here for endoscopic evaluation.     HPI: Family history of colon cancer.       Past Medical History:   Diagnosis Date   • Allergic    • Anxiety    • Depression    • Hormone replacement therapy    • Hypothyroidism    • PONV (postoperative nausea and vomiting)        Past Surgical History:   Procedure Laterality Date   • BACK SURGERY     •  SECTION     • CHOLECYSTECTOMY     • LUMBAR DISCECTOMY Right 2020    Procedure: L3 & L4 MICRO DISCECTOMY WITH METRIX;  Surgeon: Edwin Mcgraw MD;  Location: [facility];  Service: Neurosurgery;  Laterality: Right;   • TONSILLECTOMY     • TUBAL ABDOMINAL LIGATION         Prior to Admission medications    Medication Sig Start Date End Date Taking? Authorizing Provider   azelastine (ASTEPRO) 0.15 % solution nasal spray 1 spray into the nostril(s) as directed by provider Daily. 20  Yes Geovanna Kinney MD   estradiol (Estrace) 1 MG tablet Take 1 tablet by mouth Daily. 20  Yes Rox Hooker PA   fluticasone (FLONASE) 50 MCG/ACT nasal spray 2 sprays into the nostril(s) as directed by provider Daily. 20  Yes Geovanna Kinney MD   gabapentin (NEURONTIN) 600 MG tablet Take 1 tablet by mouth 2 (Two) Times a Day. 20  Yes Edwin Mcgraw MD   levothyroxine (Synthroid) 112 MCG tablet One tablet daily except Wednesday and  when you take 1.5tb 20  Yes Geovanna Kinney MD   medroxyPROGESTERone (Provera) 2.5 MG tablet Take 1 tablet by mouth Daily. 20  Yes Rox Hooker PA   sertraline (ZOLOFT) 100 MG tablet Take 1 tablet by mouth Daily. 20  Yes Geovanna Kinney MD   tiZANidine (ZANAFLEX) 2 MG tablet Take 1 tablet by mouth Every 8 (Eight) Hours As Needed for Muscle Spasms. 20  Yes Edwin Mcrgaw MD   diazePAM (VALIUM) 5 MG tablet Take 1-2 tablets 30 min prior to MRI 6/3/20   Edwin Mcgraw MD   oxyCODONE-acetaminophen (Percocet) 7.5-325 MG per tablet TAKE 1 PILL 30 MINUTES PRIOR TO MRI 6/10/20    Edwin Mcgraw MD       Allergies   Allergen Reactions   • Adhesive Tape Hives and Itching   • Chlorhexidine Dermatitis     Severe erythematous, raised, blistering irritation in perfect square surrounding the surgical incision.        Family History   Problem Relation Age of Onset   • Cancer Mother    • Heart disease Mother    • Heart disease Father    • Diabetes Father    • Heart disease Brother    • No Known Problems Sister    • No Known Problems Daughter    • No Known Problems Son    • No Known Problems Maternal Grandmother    • No Known Problems Paternal Grandmother    • No Known Problems Maternal Aunt    • No Known Problems Paternal Aunt    • BRCA 1/2 Neg Hx    • Breast cancer Neg Hx    • Colon cancer Neg Hx    • Endometrial cancer Neg Hx    • Ovarian cancer Neg Hx        OBJECTIVE:    Patient's vital signs reviewed. No acute distress.     Chest is clear, no wheezing or rales. Normal symmetric air entry throughout both lung fields. No chest wall deformities or tenderness.    S1 and S2 normal, no murmurs, clicks, gallops or rubs. Regular rate and rhythm. Chest is clear; no wheezes or rales. No edema or JVD.    The abdomen is soft without tenderness, guarding, mass, rebound or organomegaly. Bowel sounds are normal. No CVA tenderness or inguinal adenopathy noted.    Patient is alert, oriented and with an intact memory.    Assessment: Family history of colon cancer.     Plan: Colonoscopy.

## 2020-07-26 DIAGNOSIS — Z09 SURGICAL FOLLOWUP VISIT: ICD-10-CM

## 2020-07-27 RX ORDER — GABAPENTIN 600 MG/1
TABLET ORAL
Qty: 60 TABLET | Refills: 3 | Status: SHIPPED | OUTPATIENT
Start: 2020-07-27 | End: 2020-08-25 | Stop reason: CLARIF

## 2020-08-06 DIAGNOSIS — E78.00 HYPERCHOLESTEROLEMIA: ICD-10-CM

## 2020-08-06 DIAGNOSIS — R73.01 IFG (IMPAIRED FASTING GLUCOSE): ICD-10-CM

## 2020-08-10 RX ORDER — MEDROXYPROGESTERONE ACETATE 2.5 MG/1
TABLET ORAL
Qty: 30 TABLET | Refills: 2 | Status: SHIPPED | OUTPATIENT
Start: 2020-08-10 | End: 2020-12-01 | Stop reason: SDUPTHER

## 2020-08-11 LAB
ALBUMIN SERPL-MCNC: 4.8 G/DL (ref 3.5–5.2)
ALBUMIN/GLOB SERPL: 2.8 G/DL
ALP SERPL-CCNC: 101 U/L (ref 39–117)
ALT SERPL-CCNC: 43 U/L (ref 1–33)
AST SERPL-CCNC: 42 U/L (ref 1–32)
BILIRUB SERPL-MCNC: 0.5 MG/DL (ref 0–1.2)
BUN SERPL-MCNC: 11 MG/DL (ref 6–20)
BUN/CREAT SERPL: 14.3 (ref 7–25)
CALCIUM SERPL-MCNC: 9.4 MG/DL (ref 8.6–10.5)
CHLORIDE SERPL-SCNC: 98 MMOL/L (ref 98–107)
CHOLEST SERPL-MCNC: 259 MG/DL (ref 0–200)
CO2 SERPL-SCNC: 29.4 MMOL/L (ref 22–29)
CREAT SERPL-MCNC: 0.77 MG/DL (ref 0.57–1)
GLOBULIN SER CALC-MCNC: 1.7 GM/DL
GLUCOSE SERPL-MCNC: 127 MG/DL (ref 65–99)
HBA1C MFR BLD: 6.6 % (ref 4.8–5.6)
HDLC SERPL-MCNC: 37 MG/DL (ref 40–60)
LDLC SERPL CALC-MCNC: ABNORMAL MG/DL
LDLC/HDLC SERPL: ABNORMAL {RATIO}
POTASSIUM SERPL-SCNC: 4.2 MMOL/L (ref 3.5–5.2)
PROT SERPL-MCNC: 6.5 G/DL (ref 6–8.5)
SODIUM SERPL-SCNC: 140 MMOL/L (ref 136–145)
TRIGL SERPL-MCNC: 447 MG/DL (ref 0–150)
VLDLC SERPL CALC-MCNC: ABNORMAL MG/DL

## 2020-08-14 ENCOUNTER — OFFICE VISIT (OUTPATIENT)
Dept: INTERNAL MEDICINE | Facility: CLINIC | Age: 50
End: 2020-08-14

## 2020-08-14 VITALS
HEIGHT: 63 IN | TEMPERATURE: 97.3 F | HEART RATE: 84 BPM | WEIGHT: 171.7 LBS | SYSTOLIC BLOOD PRESSURE: 126 MMHG | DIASTOLIC BLOOD PRESSURE: 80 MMHG | BODY MASS INDEX: 30.42 KG/M2 | OXYGEN SATURATION: 97 %

## 2020-08-14 DIAGNOSIS — E78.00 HYPERCHOLESTEROLEMIA: ICD-10-CM

## 2020-08-14 DIAGNOSIS — E11.9 NEW ONSET TYPE 2 DIABETES MELLITUS (HCC): Primary | ICD-10-CM

## 2020-08-14 DIAGNOSIS — R79.89 ELEVATED LFTS: ICD-10-CM

## 2020-08-14 PROCEDURE — 99214 OFFICE O/P EST MOD 30 MIN: CPT | Performed by: FAMILY MEDICINE

## 2020-08-14 PROCEDURE — 90732 PPSV23 VACC 2 YRS+ SUBQ/IM: CPT | Performed by: FAMILY MEDICINE

## 2020-08-14 PROCEDURE — 90471 IMMUNIZATION ADMIN: CPT | Performed by: FAMILY MEDICINE

## 2020-08-14 RX ORDER — ATORVASTATIN CALCIUM 10 MG/1
10 TABLET, FILM COATED ORAL NIGHTLY
Qty: 90 TABLET | Refills: 0 | Status: SHIPPED | OUTPATIENT
Start: 2020-08-14 | End: 2020-08-24 | Stop reason: SDUPTHER

## 2020-08-14 NOTE — PROGRESS NOTES
Subjective   Anu Adam is a 50 y.o. female.   Chief Complaint   Patient presents with   • Hyperlipidemia   • prediabetes       History of Present Illness     #1  New onset of diabetes- fasting blood sugar 127.  Last time it was 145.  A1c at 6.6.  She gained 7 pounds from last office visit.  She did stress eating during pandemic.    #2 hyperlipidemia- patient is on no statins.  Triglycerides 447, HDL 37.  LDL was not able to be calculated because of high triglycerides.    3.  LFTs elevation-AST 42, ALT 43. She had a CT of the abdomen in 2/2020 which was positive for hepatic steatosis.  She had colonoscopy in July 2020 which was normal.  BMI 30.4.  She had it worked up by GI in the past.    The following portions of the patient's history were reviewed and updated as appropriate: allergies, current medications, past family history, past medical history, past social history, past surgical history and problem list.    Review of Systems   Constitutional: Negative.    Respiratory: Negative.    Cardiovascular: Negative.          Objective   Wt Readings from Last 3 Encounters:   08/14/20 77.9 kg (171 lb 11.2 oz)   07/16/20 74.4 kg (164 lb)   05/12/20 76.2 kg (168 lb)      Vitals:    08/14/20 0929   BP: 126/80   Pulse: 84   Temp: 97.3 °F (36.3 °C)   SpO2: 97%     Temp Readings from Last 3 Encounters:   08/14/20 97.3 °F (36.3 °C)   07/16/20 97.6 °F (36.4 °C) (Oral)   07/08/20 97.9 °F (36.6 °C) (Tympanic)     BP Readings from Last 3 Encounters:   08/14/20 126/80   07/16/20 125/96   07/08/20 145/78     Pulse Readings from Last 3 Encounters:   08/14/20 84   07/16/20 83   07/08/20 74     Body mass index is 30.42 kg/m².    Physical Exam   Constitutional: She is oriented to person, place, and time. She appears well-developed and well-nourished.   HENT:   Head: Normocephalic and atraumatic.   Neck: Neck supple. Carotid bruit is not present. No thyromegaly present.   Cardiovascular: Normal rate, regular rhythm and normal heart  sounds.   Pulmonary/Chest: Effort normal and breath sounds normal.   Neurological: She is alert and oriented to person, place, and time.   Skin: Skin is warm, dry and intact.   Psychiatric: She has a normal mood and affect. Her behavior is normal.       Assessment/Plan   Anu was seen today for hyperlipidemia and prediabetes.    Diagnoses and all orders for this visit:    New onset type 2 diabetes mellitus (CMS/[facility])  -     Ambulatory Referral to Diabetic Education  -     Ambulatory Referral to Ophthalmology  -     Comprehensive Metabolic Panel; Future  -     Lipid Panel With LDL / HDL Ratio; Future  -     Hemoglobin A1c; Future  -     MicroAlbumin, Urine, Random - Urine, Clean Catch; Future    Hypercholesterolemia  -     Comprehensive Metabolic Panel; Future  -     Lipid Panel With LDL / HDL Ratio; Future  -     Hemoglobin A1c; Future  -     MicroAlbumin, Urine, Random - Urine, Clean Catch; Future    Elevated LFTs  -     Hepatitis Panel, Acute    Other orders  -     metFORMIN (Glucophage) 500 MG tablet; Take 1 tablet by mouth Daily With Breakfast.  -     atorvastatin (LIPITOR) 10 MG tablet; Take 1 tablet by mouth Every Night.        #1 new onset of diabetes type 2.  Referred patient to diabetes education.  I referred patient to ophthalmologist.  We are starting metformin 500 mg once a day.  Survivor skills for diabetes management folder given to patient.  She will check with her insurance about coverage for glucometer and will let us know which one we need to send to pharmacy.  She is getting Pneumovax today.  Labs in 3 months before office visit.  We will need foot exam at next office visit.    2.  Hyperlipidemia-uncontrolled.  We are starting atorvastatin at 10 mg a day.  Labs in 3 months before office visit.    3.  LFTs elevation- weight loss can help with improving numbers.  Will check a hepatitis panel.

## 2020-08-15 LAB
HAV IGM SERPL QL IA: NEGATIVE
HBV CORE IGM SERPL QL IA: NEGATIVE
HBV SURFACE AG SERPL QL IA: NEGATIVE
HCV AB S/CO SERPL IA: <0.1 S/CO RATIO (ref 0–0.9)

## 2020-08-24 RX ORDER — ATORVASTATIN CALCIUM 10 MG/1
10 TABLET, FILM COATED ORAL NIGHTLY
Qty: 90 TABLET | Refills: 0 | Status: SHIPPED | OUTPATIENT
Start: 2020-08-24 | End: 2020-09-29

## 2020-08-25 ENCOUNTER — TELEPHONE (OUTPATIENT)
Dept: NEUROSURGERY | Facility: CLINIC | Age: 50
End: 2020-08-25

## 2020-08-25 RX ORDER — GABAPENTIN 300 MG/1
300 CAPSULE ORAL 2 TIMES DAILY
Qty: 60 CAPSULE | Refills: 2 | Status: SHIPPED | OUTPATIENT
Start: 2020-08-25 | End: 2021-02-08 | Stop reason: SDUPTHER

## 2020-09-01 ENCOUNTER — APPOINTMENT (OUTPATIENT)
Dept: DIABETES SERVICES | Facility: HOSPITAL | Age: 50
End: 2020-09-01

## 2020-09-01 RX ORDER — LANCETS 33 GAUGE
EACH MISCELLANEOUS
Qty: 100 EACH | Refills: 3 | Status: SHIPPED | OUTPATIENT
Start: 2020-09-01 | End: 2022-12-22

## 2020-09-08 ENCOUNTER — APPOINTMENT (OUTPATIENT)
Dept: DIABETES SERVICES | Facility: HOSPITAL | Age: 50
End: 2020-09-08

## 2020-09-09 NOTE — PROGRESS NOTES
Subjective   History of Present Illness: Anu Adam is a 50 y.o. female is here today for follow-up after PT, however, she had to stop as she lost her job.  She is following HEP     Patient had surgery 2.23.20, L3/4 microdiscectomy.     Patient was last seen 7.8.20 for back and right leg pain and weakness    Patient states that she still intermittent low back pain with prolonged sitting and she still has pain right thigh.  She denies leg weakness, N/T, urinary incontinence or problems with her balance and gait    She is taking Zanaflex (1/2) tablet prn and Gabapentin 300 mg BID    It is been several months since her surgery.  2 months ago she started to turn around in the back and the right leg started to feel better.  We will cut back her gabapentin to 300 mg twice daily a few months ago.  She is generally doing well.  Unfortunately she lost her job.  But she still has some persistent pain in the right anterior thigh.  Not nearly as bad as it was before.  That is residual nerve pain.  I told her I thought that we then continue her on the gabapentin at 300 mg twice daily.  She had been on 600 mg twice daily before that.  She will continue her exercises and walking and stretching.  We will do a tele-visit in 4 months.  Perhaps we can reduce more gabapentin at that time.    Back Pain  The problem occurs constantly. The problem is unchanged. The pain is present in the lumbar spine. The pain is at a severity of 4/10. The pain is moderate. The symptoms are aggravated by position. Associated symptoms include leg pain. Pertinent negatives include no fever.   Leg Pain   The pain is present in the right thigh. The pain is at a severity of 4/10. The pain is moderate.       The following portions of the patient's history were reviewed and updated as appropriate: allergies, current medications, past family history, past medical history, past social history, past surgical history and problem list.    Review of Systems    Constitutional: Negative for fever.   Musculoskeletal: Positive for back pain.           Objective     Vitals:    09/24/20 1131   BP: 138/78   Pulse: 74   Temp: 98.2 °F (36.8 °C)   TempSrc: Temporal   Weight: 78 kg (172 lb)     Body mass index is 30.48 kg/m².      Physical Exam   Constitutional: She is oriented to person, place, and time. She appears well-developed.   HENT:   Head: Normocephalic and atraumatic.   Eyes: Pupils are equal, round, and reactive to light. Conjunctivae and EOM are normal.   Fundoscopic exam:       The right eye shows no papilledema.        The left eye shows no papilledema.   Neck: Carotid bruit is not present.   Neurological: She is oriented to person, place, and time. She has a normal Finger-Nose-Finger Test and a normal Heel to Shin Test. Gait normal.   Reflex Scores:       Tricep reflexes are 2+ on the right side and 2+ on the left side.       Bicep reflexes are 2+ on the right side and 2+ on the left side.       Brachioradialis reflexes are 2+ on the right side and 2+ on the left side.       Patellar reflexes are 2+ on the right side and 2+ on the left side.       Achilles reflexes are 2+ on the right side and 2+ on the left side.  Psychiatric: Her speech is normal.     Neurologic Exam     Mental Status   Oriented to person, place, and time.   Registration of memory: Good recent and remote memory.   Attention: normal. Concentration: normal.   Speech: speech is normal   Level of consciousness: alert  Knowledge: consistent with education.     Cranial Nerves     CN II   Visual fields full to confrontation.   Visual acuity: normal    CN III, IV, VI   Pupils are equal, round, and reactive to light.  Extraocular motions are normal.     CN V   Facial sensation intact.   Right corneal reflex: normal  Left corneal reflex: normal    CN VII   Facial expression full, symmetric.   Right facial weakness: none  Left facial weakness: none    CN VIII   Hearing: intact    CN IX, X   Palate:  symmetric    CN XI   Right sternocleidomastoid strength: normal  Left sternocleidomastoid strength: normal    CN XII   Tongue: not atrophic  Tongue deviation: none    Motor Exam   Muscle bulk: normal  Right arm tone: normal  Left arm tone: normal  Right leg tone: normal  Left leg tone: normal    Strength   Strength 5/5 except as noted.     Sensory Exam   Light touch normal.     Gait, Coordination, and Reflexes     Gait  Gait: normal    Coordination   Finger to nose coordination: normal  Heel to shin coordination: normal    Reflexes   Right brachioradialis: 2+  Left brachioradialis: 2+  Right biceps: 2+  Left biceps: 2+  Right triceps: 2+  Left triceps: 2+  Right patellar: 2+  Left patellar: 2+  Right achilles: 2+  Left achilles: 2+  Right : 2+  Left : 2+          Assessment/Plan   Independent Review of Radiographic Studies:      I personally reviewed the images from the following studies.    The MRI done today on 6/11/2020 actually looks quite good.  The herniated disc fragment above the L3-L4 disc base has been completely removed.  The discs are well-hydrated with no nerve root compression.  Agree with the report.    Medical Decision Making:      Overall continues to improve.  I asked you to continue the gabapentin at 300 mg twice daily and will do a tele-visit in 4 months.  If we can wean the gabapentin at that time we will.  She will continue her exercises and stretching.      Anu was seen today for back pain and leg pain.    Diagnoses and all orders for this visit:    DDD (degenerative disc disease), lumbar    Back spasm      Return in about 4 months (around 1/24/2021) for As a tele-visit.

## 2020-09-15 ENCOUNTER — APPOINTMENT (OUTPATIENT)
Dept: DIABETES SERVICES | Facility: HOSPITAL | Age: 50
End: 2020-09-15

## 2020-09-24 ENCOUNTER — OFFICE VISIT (OUTPATIENT)
Dept: NEUROSURGERY | Facility: CLINIC | Age: 50
End: 2020-09-24

## 2020-09-24 VITALS
HEART RATE: 74 BPM | WEIGHT: 172 LBS | DIASTOLIC BLOOD PRESSURE: 78 MMHG | BODY MASS INDEX: 30.48 KG/M2 | SYSTOLIC BLOOD PRESSURE: 138 MMHG | TEMPERATURE: 98.2 F

## 2020-09-24 DIAGNOSIS — M62.830 BACK SPASM: ICD-10-CM

## 2020-09-24 DIAGNOSIS — M51.36 DDD (DEGENERATIVE DISC DISEASE), LUMBAR: Primary | ICD-10-CM

## 2020-09-24 PROCEDURE — 99213 OFFICE O/P EST LOW 20 MIN: CPT | Performed by: NEUROLOGICAL SURGERY

## 2020-09-29 RX ORDER — ROSUVASTATIN CALCIUM 5 MG/1
5 TABLET, COATED ORAL DAILY
Qty: 30 TABLET | Refills: 0 | Status: SHIPPED | OUTPATIENT
Start: 2020-09-29 | End: 2020-11-25 | Stop reason: SDUPTHER

## 2020-10-03 ENCOUNTER — HOSPITAL ENCOUNTER (INPATIENT)
Facility: HOSPITAL | Age: 50
LOS: 2 days | Discharge: HOME | End: 2020-10-05
Attending: EMERGENCY MEDICINE | Admitting: UROLOGY

## 2020-10-03 ENCOUNTER — APPOINTMENT (OUTPATIENT)
Dept: CT IMAGING | Facility: HOSPITAL | Age: 50
End: 2020-10-03

## 2020-10-03 DIAGNOSIS — N20.0 BILATERAL NEPHROLITHIASIS: ICD-10-CM

## 2020-10-03 DIAGNOSIS — N20.0 BILATERAL NEPHROLITHIASIS: Primary | ICD-10-CM

## 2020-10-03 LAB
ALBUMIN SERPL-MCNC: 4.6 G/DL (ref 3.5–5.2)
ALBUMIN/GLOB SERPL: 2 G/DL
ALP SERPL-CCNC: 93 U/L (ref 39–117)
ALT SERPL W P-5'-P-CCNC: 45 U/L (ref 1–33)
ANION GAP SERPL CALCULATED.3IONS-SCNC: 11.7 MMOL/L (ref 5–15)
AST SERPL-CCNC: 39 U/L (ref 1–32)
B PARAPERT DNA SPEC QL NAA+PROBE: NOT DETECTED
B PERT DNA SPEC QL NAA+PROBE: NOT DETECTED
BACTERIA UR QL AUTO: ABNORMAL /HPF
BASOPHILS # BLD AUTO: 0.08 10*3/MM3 (ref 0–0.2)
BASOPHILS NFR BLD AUTO: 0.8 % (ref 0–1.5)
BILIRUB SERPL-MCNC: 0.5 MG/DL (ref 0–1.2)
BILIRUB UR QL STRIP: NEGATIVE
BUN SERPL-MCNC: 15 MG/DL (ref 6–20)
BUN/CREAT SERPL: 18.3 (ref 7–25)
C PNEUM DNA NPH QL NAA+NON-PROBE: NOT DETECTED
CALCIUM SPEC-SCNC: 10.3 MG/DL (ref 8.6–10.5)
CHLORIDE SERPL-SCNC: 98 MMOL/L (ref 98–107)
CLARITY UR: CLEAR
CO2 SERPL-SCNC: 27.3 MMOL/L (ref 22–29)
COLOR UR: YELLOW
CREAT SERPL-MCNC: 0.82 MG/DL (ref 0.57–1)
DEPRECATED RDW RBC AUTO: 42.2 FL (ref 37–54)
EOSINOPHIL # BLD AUTO: 0.3 10*3/MM3 (ref 0–0.4)
EOSINOPHIL NFR BLD AUTO: 2.9 % (ref 0.3–6.2)
ERYTHROCYTE [DISTWIDTH] IN BLOOD BY AUTOMATED COUNT: 12.7 % (ref 12.3–15.4)
FLUAV H1 2009 PAND RNA NPH QL NAA+PROBE: NOT DETECTED
FLUAV H1 HA GENE NPH QL NAA+PROBE: NOT DETECTED
FLUAV H3 RNA NPH QL NAA+PROBE: NOT DETECTED
FLUAV SUBTYP SPEC NAA+PROBE: NOT DETECTED
FLUBV RNA ISLT QL NAA+PROBE: NOT DETECTED
GFR SERPL CREATININE-BSD FRML MDRD: 74 ML/MIN/1.73
GLOBULIN UR ELPH-MCNC: 2.3 GM/DL
GLUCOSE SERPL-MCNC: 145 MG/DL (ref 65–99)
GLUCOSE UR STRIP-MCNC: NEGATIVE MG/DL
HADV DNA SPEC NAA+PROBE: NOT DETECTED
HCG SERPL QL: NEGATIVE
HCOV 229E RNA SPEC QL NAA+PROBE: NOT DETECTED
HCOV HKU1 RNA SPEC QL NAA+PROBE: NOT DETECTED
HCOV NL63 RNA SPEC QL NAA+PROBE: NOT DETECTED
HCOV OC43 RNA SPEC QL NAA+PROBE: NOT DETECTED
HCT VFR BLD AUTO: 40.2 % (ref 34–46.6)
HGB BLD-MCNC: 13.8 G/DL (ref 12–15.9)
HGB UR QL STRIP.AUTO: ABNORMAL
HMPV RNA NPH QL NAA+NON-PROBE: NOT DETECTED
HPIV1 RNA SPEC QL NAA+PROBE: NOT DETECTED
HPIV2 RNA SPEC QL NAA+PROBE: NOT DETECTED
HPIV3 RNA NPH QL NAA+PROBE: NOT DETECTED
HPIV4 P GENE NPH QL NAA+PROBE: NOT DETECTED
HYALINE CASTS UR QL AUTO: ABNORMAL /LPF
IMM GRANULOCYTES # BLD AUTO: 0.05 10*3/MM3 (ref 0–0.05)
IMM GRANULOCYTES NFR BLD AUTO: 0.5 % (ref 0–0.5)
INR PPP: 1 (ref 0.9–1.1)
KETONES UR QL STRIP: NEGATIVE
LEUKOCYTE ESTERASE UR QL STRIP.AUTO: ABNORMAL
LIPASE SERPL-CCNC: 25 U/L (ref 13–60)
LYMPHOCYTES # BLD AUTO: 1.95 10*3/MM3 (ref 0.7–3.1)
LYMPHOCYTES NFR BLD AUTO: 19 % (ref 19.6–45.3)
M PNEUMO IGG SER IA-ACNC: NOT DETECTED
MAGNESIUM SERPL-MCNC: 1.8 MG/DL (ref 1.6–2.6)
MCH RBC QN AUTO: 30.9 PG (ref 26.6–33)
MCHC RBC AUTO-ENTMCNC: 34.3 G/DL (ref 31.5–35.7)
MCV RBC AUTO: 90.1 FL (ref 79–97)
MONOCYTES # BLD AUTO: 0.95 10*3/MM3 (ref 0.1–0.9)
MONOCYTES NFR BLD AUTO: 9.3 % (ref 5–12)
NEUTROPHILS NFR BLD AUTO: 6.91 10*3/MM3 (ref 1.7–7)
NEUTROPHILS NFR BLD AUTO: 67.5 % (ref 42.7–76)
NITRITE UR QL STRIP: NEGATIVE
NRBC BLD AUTO-RTO: 0 /100 WBC (ref 0–0.2)
PH UR STRIP.AUTO: 5.5 [PH] (ref 5–8)
PLATELET # BLD AUTO: 284 10*3/MM3 (ref 140–450)
PMV BLD AUTO: 9.4 FL (ref 6–12)
POTASSIUM SERPL-SCNC: 3.7 MMOL/L (ref 3.5–5.2)
PROT SERPL-MCNC: 6.9 G/DL (ref 6–8.5)
PROT UR QL STRIP: ABNORMAL
PROTHROMBIN TIME: 13.1 SECONDS (ref 11.7–14.2)
RBC # BLD AUTO: 4.46 10*6/MM3 (ref 3.77–5.28)
RBC # UR: ABNORMAL /HPF
REF LAB TEST METHOD: ABNORMAL
RHINOVIRUS RNA SPEC NAA+PROBE: NOT DETECTED
RSV RNA NPH QL NAA+NON-PROBE: NOT DETECTED
SARS-COV-2 RNA NPH QL NAA+NON-PROBE: NOT DETECTED
SODIUM SERPL-SCNC: 137 MMOL/L (ref 136–145)
SP GR UR STRIP: 1.02 (ref 1–1.03)
SQUAMOUS #/AREA URNS HPF: ABNORMAL /HPF
UROBILINOGEN UR QL STRIP: ABNORMAL
WBC # BLD AUTO: 10.24 10*3/MM3 (ref 3.4–10.8)
WBC UR QL AUTO: ABNORMAL /HPF

## 2020-10-03 PROCEDURE — 74177 CT ABD & PELVIS W/CONTRAST: CPT

## 2020-10-03 PROCEDURE — 25010000002 IOPAMIDOL 61 % SOLUTION: Performed by: EMERGENCY MEDICINE

## 2020-10-03 PROCEDURE — 80053 COMPREHEN METABOLIC PANEL: CPT | Performed by: EMERGENCY MEDICINE

## 2020-10-03 PROCEDURE — 25010000002 CEFTRIAXONE PER 250 MG: Performed by: EMERGENCY MEDICINE

## 2020-10-03 PROCEDURE — 25010000002 KETOROLAC TROMETHAMINE PER 15 MG: Performed by: EMERGENCY MEDICINE

## 2020-10-03 PROCEDURE — 99285 EMERGENCY DEPT VISIT HI MDM: CPT

## 2020-10-03 PROCEDURE — 85025 COMPLETE CBC W/AUTO DIFF WBC: CPT | Performed by: EMERGENCY MEDICINE

## 2020-10-03 PROCEDURE — 85610 PROTHROMBIN TIME: CPT | Performed by: EMERGENCY MEDICINE

## 2020-10-03 PROCEDURE — 84703 CHORIONIC GONADOTROPIN ASSAY: CPT | Performed by: EMERGENCY MEDICINE

## 2020-10-03 PROCEDURE — 81001 URINALYSIS AUTO W/SCOPE: CPT | Performed by: EMERGENCY MEDICINE

## 2020-10-03 PROCEDURE — 87086 URINE CULTURE/COLONY COUNT: CPT | Performed by: EMERGENCY MEDICINE

## 2020-10-03 PROCEDURE — 83690 ASSAY OF LIPASE: CPT | Performed by: EMERGENCY MEDICINE

## 2020-10-03 PROCEDURE — 0202U NFCT DS 22 TRGT SARS-COV-2: CPT | Performed by: EMERGENCY MEDICINE

## 2020-10-03 PROCEDURE — 83735 ASSAY OF MAGNESIUM: CPT | Performed by: EMERGENCY MEDICINE

## 2020-10-03 RX ORDER — SODIUM CHLORIDE 0.9 % (FLUSH) 0.9 %
10 SYRINGE (ML) INJECTION AS NEEDED
Status: DISCONTINUED | OUTPATIENT
Start: 2020-10-03 | End: 2020-10-05 | Stop reason: HOSPADM

## 2020-10-03 RX ORDER — CEFTRIAXONE SODIUM 1 G/50ML
1 INJECTION, SOLUTION INTRAVENOUS ONCE
Status: COMPLETED | OUTPATIENT
Start: 2020-10-03 | End: 2020-10-03

## 2020-10-03 RX ORDER — KETOROLAC TROMETHAMINE 15 MG/ML
15 INJECTION, SOLUTION INTRAMUSCULAR; INTRAVENOUS ONCE
Status: COMPLETED | OUTPATIENT
Start: 2020-10-03 | End: 2020-10-03

## 2020-10-03 RX ORDER — LEVOTHYROXINE SODIUM 112 UG/1
112 TABLET ORAL DAILY
COMMUNITY
End: 2021-03-05

## 2020-10-03 RX ADMIN — CEFTRIAXONE SODIUM 1 G: 1 INJECTION, SOLUTION INTRAVENOUS at 21:49

## 2020-10-03 RX ADMIN — IOPAMIDOL 85 ML: 612 INJECTION, SOLUTION INTRAVENOUS at 20:16

## 2020-10-03 RX ADMIN — SODIUM CHLORIDE 1000 ML: 9 INJECTION, SOLUTION INTRAVENOUS at 19:31

## 2020-10-03 RX ADMIN — KETOROLAC TROMETHAMINE 15 MG: 15 INJECTION, SOLUTION INTRAMUSCULAR; INTRAVENOUS at 20:43

## 2020-10-03 NOTE — ED TRIAGE NOTES
Pt reports intermittent right lower abd. Pain radiates to right flank pt reports pain more severe today. Pt reports hx of kidney stone. Pt also reports diarrhea. Mask placed on patient in first look triage. Triage staff wearing masks.

## 2020-10-03 NOTE — ED PROVIDER NOTES
EMERGENCY DEPARTMENT ENCOUNTER    Room Number:  18/18  Date of encounter:  10/3/2020  PCP: Geovanna Kinney MD  Historian: Patient      HPI:  Chief Complaint: Right lower quadrant pain  A complete HPI/ROS/PMH/PSH/SH/FH are unobtainable due to: Not applicable  Context: Anu Adam is a 50 y.o. female who presents to the ED c/o right lower quadrant pain that started approximately 1 week ago.  The pain radiates up to the right lower portion of her back in the lumbar region ( lower lumbar region).  Pain is sharp.  Pain is been fairly constant but will wax and wane in severity.  It was constant for the first 5 days and then actually was better 2 days prior to arrival here and yesterday.  Then started coming back again this evening.  Patient is also unaware of anything that makes the pain worse or better.  She does have a history of kidney stones and she is little concerned that this might be a kidney stone.  He has had urinary frequency and she also reports there is a little pressure in her bladder when she urinates.  She has had diarrhea this been going on for the past week.  She has had usually 2-3 episodes of diarrhea day.  She had 2 episodes of diarrhea today.  No blood in the diarrhea.  He has been tolerating p.o. well without any vomiting.  No report of any fevers, chills, cough, chest pain, or any COVID exposure that she is aware of.      Previous Episodes: Possibly from a kidney stone  Current Symptoms: See above    MEDICAL HISTORY REVIEWED  Patient had a colonoscopy in July 2020 which was unremarkable.  It was done as screening tool per old records.  Patient had a CT scan of the abdomen and pelvis in February 2020.  It showed hepatic steatosis bilateral nephrolithiasis without hydronephrosis, small hiatal hernia and other nonspecific findings please see report for complete detail.      PAST MEDICAL HISTORY  Active Ambulatory Problems     Diagnosis Date Noted   • Mixed anxiety depressive disorder 02/03/2016    • Hypercholesterolemia 2016   • Hyperglycemia 2016   • Hypothyroidism 2016   • Chronic non-seasonal allergic rhinitis 10/17/2017   • Family history of GI malignancy 2020   • Acute right-sided low back pain with right-sided sciatica 2020   • Surgical followup visit 2020   • Back spasm 2020   • Herniation of lumbar intervertebral disc with radiculopathy 2020   • DDD (degenerative disc disease), lumbar 2020   • History of spinal surgery 2020     Resolved Ambulatory Problems     Diagnosis Date Noted   • Atopic rhinitis 2016     Past Medical History:   Diagnosis Date   • Allergic    • Anxiety    • Depression    • Hormone replacement therapy    • PONV (postoperative nausea and vomiting)          PAST SURGICAL HISTORY  Past Surgical History:   Procedure Laterality Date   • BACK SURGERY     •  SECTION     • CHOLECYSTECTOMY     • COLONOSCOPY N/A 2020    Procedure: COLONOSCOPY;  Surgeon: Emory Acosta MD;  Location: [facility] ENDOSCOPY;  Service: Gastroenterology;  Laterality: N/A;  PRE- SCREENING, FAMILY HX COLON CA  POST- NORMAL   • LUMBAR DISCECTOMY Right 2020    Procedure: L3 & L4 MICRO DISCECTOMY WITH METRIX;  Surgeon: Edwin Mcgraw MD;  Location: [facility] OR;  Service: Neurosurgery;  Laterality: Right;   • TONSILLECTOMY     • TUBAL ABDOMINAL LIGATION           FAMILY HISTORY  Family History   Problem Relation Age of Onset   • Cancer Mother    • Heart disease Mother    • Heart disease Father    • Diabetes Father    • Heart disease Brother    • No Known Problems Sister    • No Known Problems Daughter    • No Known Problems Son    • No Known Problems Maternal Grandmother    • No Known Problems Paternal Grandmother    • No Known Problems Maternal Aunt    • No Known Problems Paternal Aunt    • BRCA 1/2 Neg Hx    • Breast cancer Neg Hx    • Colon cancer Neg Hx    • Endometrial cancer Neg Hx    • Ovarian cancer Neg Hx           SOCIAL HISTORY  Social History     Socioeconomic History   • Marital status: Single     Spouse name: Not on file   • Number of children: Not on file   • Years of education: Not on file   • Highest education level: Not on file   Tobacco Use   • Smoking status: Never Smoker   • Smokeless tobacco: Never Used   Substance and Sexual Activity   • Alcohol use: Yes   • Drug use: No   • Sexual activity: Defer         ALLERGIES  Adhesive tape and Chlorhexidine        REVIEW OF SYSTEMS  Review of Systems     All systems reviewed and negative except for those discussed in HPI.       PHYSICAL EXAM    I have reviewed the triage vital signs and nursing notes.    ED Triage Vitals   Temp Heart Rate Resp BP SpO2   10/03/20 1840 10/03/20 1838 10/03/20 1838 10/03/20 1903 10/03/20 1838   98.6 °F (37 °C) 113 18 132/83 96 %      Temp src Heart Rate Source Patient Position BP Location FiO2 (%)   10/03/20 1840 -- 10/03/20 1903 10/03/20 1903 --   Tympanic  Lying Left arm        GENERAL: Pleasant female no acute distress.Vital signs on my initial evaluation unremarkable.  Patient is not tachycardic on my exam has a normal heart rate  HENT: nares patent  Head/neck/ face are symmetric without gross deformity, signs of trauma, or swelling  EYES: no scleral icterus, no conjunctival pallor.  NECK: Supple, no meningismus  CV: regular rhythm, regular rate with intact distal pulses.  No murmur rub  RESPIRATORY: normal effort and no respiratory distress.  Clear to auscultation bilaterally  ABDOMEN: soft and obese.  Patient has some subjective tenderness in her right lower quadrant right above the inguinal ligament and the right.  It is reproducible with palpation.  There is some radiation to her right lower lumbar region.  No focal midline pain in her back.  She has normal bowel sounds.  There is no guarding or rebound.  MUSCULOSKELETAL: no deformity.  No edema.  Patient has good strong intact distal pulses to upper and lower extremities  bilaterally.  NEURO: alert and appropriate, moves all extremities, follows commands.  Alert and oriented x3.  No focal motor or sensory changes.  SKIN: warm, dry    Vital signs and nursing notes reviewed.        LAB RESULTS  Recent Results (from the past 24 hour(s))   Comprehensive Metabolic Panel    Collection Time: 10/03/20  7:27 PM    Specimen: Arm, Left; Blood   Result Value Ref Range    Glucose 145 (H) 65 - 99 mg/dL    BUN 15 6 - 20 mg/dL    Creatinine 0.82 0.57 - 1.00 mg/dL    Sodium 137 136 - 145 mmol/L    Potassium 3.7 3.5 - 5.2 mmol/L    Chloride 98 98 - 107 mmol/L    CO2 27.3 22.0 - 29.0 mmol/L    Calcium 10.3 8.6 - 10.5 mg/dL    Total Protein 6.9 6.0 - 8.5 g/dL    Albumin 4.60 3.50 - 5.20 g/dL    ALT (SGPT) 45 (H) 1 - 33 U/L    AST (SGOT) 39 (H) 1 - 32 U/L    Alkaline Phosphatase 93 39 - 117 U/L    Total Bilirubin 0.5 0.0 - 1.2 mg/dL    eGFR Non African Amer 74 >60 mL/min/1.73    Globulin 2.3 gm/dL    A/G Ratio 2.0 g/dL    BUN/Creatinine Ratio 18.3 7.0 - 25.0    Anion Gap 11.7 5.0 - 15.0 mmol/L   Protime-INR    Collection Time: 10/03/20  7:27 PM    Specimen: Arm, Left; Blood   Result Value Ref Range    Protime 13.1 11.7 - 14.2 Seconds    INR 1.00 0.90 - 1.10   Magnesium    Collection Time: 10/03/20  7:27 PM    Specimen: Arm, Left; Blood   Result Value Ref Range    Magnesium 1.8 1.6 - 2.6 mg/dL   Lipase    Collection Time: 10/03/20  7:27 PM    Specimen: Arm, Left; Blood   Result Value Ref Range    Lipase 25 13 - 60 U/L   hCG, Serum, Qualitative    Collection Time: 10/03/20  7:27 PM    Specimen: Arm, Left; Blood   Result Value Ref Range    HCG Qualitative Negative Negative   CBC Auto Differential    Collection Time: 10/03/20  7:27 PM    Specimen: Arm, Left; Blood   Result Value Ref Range    WBC 10.24 3.40 - 10.80 10*3/mm3    RBC 4.46 3.77 - 5.28 10*6/mm3    Hemoglobin 13.8 12.0 - 15.9 g/dL    Hematocrit 40.2 34.0 - 46.6 %    MCV 90.1 79.0 - 97.0 fL    MCH 30.9 26.6 - 33.0 pg    MCHC 34.3 31.5 - 35.7 g/dL     RDW 12.7 12.3 - 15.4 %    RDW-SD 42.2 37.0 - 54.0 fl    MPV 9.4 6.0 - 12.0 fL    Platelets 284 140 - 450 10*3/mm3    Neutrophil % 67.5 42.7 - 76.0 %    Lymphocyte % 19.0 (L) 19.6 - 45.3 %    Monocyte % 9.3 5.0 - 12.0 %    Eosinophil % 2.9 0.3 - 6.2 %    Basophil % 0.8 0.0 - 1.5 %    Immature Grans % 0.5 0.0 - 0.5 %    Neutrophils, Absolute 6.91 1.70 - 7.00 10*3/mm3    Lymphocytes, Absolute 1.95 0.70 - 3.10 10*3/mm3    Monocytes, Absolute 0.95 (H) 0.10 - 0.90 10*3/mm3    Eosinophils, Absolute 0.30 0.00 - 0.40 10*3/mm3    Basophils, Absolute 0.08 0.00 - 0.20 10*3/mm3    Immature Grans, Absolute 0.05 0.00 - 0.05 10*3/mm3    nRBC 0.0 0.0 - 0.2 /100 WBC   Urinalysis With Microscopic If Indicated (No Culture) - Urine, Clean Catch    Collection Time: 10/03/20  7:29 PM    Specimen: Urine, Clean Catch   Result Value Ref Range    Color, UA Yellow Yellow, Straw    Appearance, UA Clear Clear    pH, UA 5.5 5.0 - 8.0    Specific Gravity, UA 1.016 1.005 - 1.030    Glucose, UA Negative Negative    Ketones, UA Negative Negative    Bilirubin, UA Negative Negative    Blood, UA Large (3+) (A) Negative    Protein, UA Trace (A) Negative    Leuk Esterase, UA Trace (A) Negative    Nitrite, UA Negative Negative    Urobilinogen, UA 0.2 E.U./dL 0.2 - 1.0 E.U./dL   Urinalysis, Microscopic Only - Urine, Clean Catch    Collection Time: 10/03/20  7:29 PM    Specimen: Urine, Clean Catch   Result Value Ref Range    RBC, UA Too Numerous to Count (A) None Seen, 0-2 /HPF    WBC, UA 6-12 (A) None Seen, 0-2 /HPF    Bacteria, UA None Seen None Seen /HPF    Squamous Epithelial Cells, UA 0-2 None Seen, 0-2 /HPF    Hyaline Casts, UA 7-12 None Seen /LPF    Methodology Automated Microscopy        Ordered the above labs and independently reviewed the results.        RADIOLOGY  Ct Abdomen Pelvis With Contrast    Result Date: 10/3/2020  CT OF THE ABDOMEN AND PELVIS WITH CONTRAST  HISTORY: Right lower quadrant pain  COMPARISON: 02/12/2020  TECHNIQUE: Axial CT  imaging was obtained through the abdomen and pelvis. IV contrast was administered.  FINDINGS: Images through the lung bases demonstrate mild atelectasis. Liver is mildly enlarged at 16.5 cm in craniocaudal dimensions. It is steatotic. No focal hepatic lesions are seen. Gallbladder is surgically absent. The spleen, adrenal glands, and pancreas are normal. There is a small hiatal hernia. 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 The patient has moderate to severe right-sided hydroureteronephrosis. This is secondary to a 5 mm stone located within the distal right ureter. Distal to this, the right ureter is decompressed. Additional punctate nonobstructing stones are suspected within both kidneys, but are difficult to assess due to the presence of IV contrast material. They were better visualized on the patient's prior CT from 02/12/2020. The patient has mild left-sided hydroureteronephrosis, which is secondary to a 6 mm stone which is located within the distal left ureter. This is a new finding when compared to the prior exam. Distal to this, the left ureter is decompressed. Urinary bladder is unremarkable. Uterus appears normal. There is no evidence of bowel obstruction. The appendix is normal. No acute osseous abnormalities are seen. There is a simple appearing exophytic cyst arising from the superior pole, which measures up to 2.9 cm. No additional follow-up is necessary. It does appear to have a parapelvic component as well. No acute osseous abnormalities are seen.       1. Moderate to severe right-sided hydroureteronephrosis, secondary to a 5 mm stone, located within the right distal ureter. 2. Mild left-sided hydroureteronephrosis, secondary to a 6 mm stone, located within the distal left ureter. 3. Punctate nonobstructing stones suspected within both kidneys, better seen on prior unenhanced imaging.  Radiation dose reduction techniques were utilized, including automated exposure control and exposure modulation based on body  size.  This report was finalized on 10/3/2020 8:43 PM by Dr. Luda An M.D.        I ordered the above noted radiological studies. Reviewed by me and discussed with radiologist.  See dictation for official radiology interpretation.      PROCEDURES    Procedures      MEDICATIONS GIVEN IN ER    Medications   sodium chloride 0.9 % flush 10 mL (has no administration in time range)   cefTRIAXone (ROCEPHIN) IVPB 1 g (has no administration in time range)   sodium chloride 0.9 % bolus 1,000 mL (0 mL Intravenous Stopped 10/3/20 2101)   iopamidol (ISOVUE-300) 61 % injection 100 mL (85 mL Intravenous Given by Other 10/3/20 2016)   ketorolac (TORADOL) injection 15 mg (15 mg Intravenous Given 10/3/20 2043)         PROGRESS, DATA ANALYSIS, CONSULTS, AND MEDICAL DECISION MAKING    Patient is refusing any pain medicine at this time.  She states that she might need some medicine to relax her in the CT scan.  It seems like when she is had the MRI she has had some sedation.  She think she is probably can be able to tolerate the CT scan.  I explained to her the test that we will order and she agrees with this plan at this time.  All questions answered.  We are currently under a pandemic from the COVID19 infection.  The patient presented to the emergency department by ambulance or personal vehicle. I followed the current protocols required by Infection Control at [facility] in my evaluation and treatment of the patient. The patient was wearing a face mask during my evaluation and throughout my encounter. During my whole encounter with this patient I used appropriate personal protective equipment.  This equipment consisted of eye protection, facemask, gown, and gloves.  I applied this equipment before entering the room.      All labs have been independently reviewed by me.  All radiology studies have been reviewed by me and discussed with radiologist dictating the report.   EKG's independently viewed and  interpreted by me.  Discussion below represents my analysis of pertinent findings related to patient's condition, differential diagnosis, treatment plan and final disposition.      ED Course as of Oct 03 2129   Sat Oct 03, 2020   2106 I have seen and reevaluated this patient.  Patient's pain is getting worse.  We just gave her Toradol.  She does not want anything like an opiate at this time.  She does not like the side effects of that medicine.  I informed her of the results of the tests and we will call to urologist.    [MM]   2126 I spoke with the urologist Dr. Chris Bennett informed the results of the CT scan.  He agrees to admit the patient.  He wants me to go ahead and give the patient a dose of Rocephin as he is very likely to take her to surgery tomorrow.    [MM]      ED Course User Index  [MM] Bradly Tejada MD       AS OF 21:29 EDT VITALS:    BP - 132/83  HR - 113  TEMP - 98.6 °F (37 °C) (Tympanic)  02 SATS - 99%        DIAGNOSIS  Final diagnoses:   Bilateral nephrolithiasis with hydronephrosis         DISPOSITION  I have reviewed the test results with my patient and explained the current treatment plan.  I answered all the patient's questions and concerns.  The patient is hemodynamically stable and is in no distress and is appropriate to be admitted to a medical/surgical floor bed at this time.             Bradly Tejada MD  10/03/20 2130

## 2020-10-03 NOTE — ED NOTES
Pt came to the ED today with c/o rt flank pain that radiates into her rt groin. Pt states this pain started last Sunday and has come and gone. Pt has some nausea that she states isnt bad, denies vomiting. Pt states she has some dysuria but currently denies it.      THIS RN WORE APPROPRIATE PPE INCLUDING MASK, EYE PROTECTION, GLOVES, AND PERFORMED HAND HYGIENE UPON ENTERING AND EXITING PATIENT Jed Bowen, SARAH  10/03/20 9400

## 2020-10-04 ENCOUNTER — APPOINTMENT (OUTPATIENT)
Dept: GENERAL RADIOLOGY | Facility: HOSPITAL | Age: 50
End: 2020-10-04

## 2020-10-04 ENCOUNTER — ANESTHESIA EVENT (OUTPATIENT)
Dept: PERIOP | Facility: HOSPITAL | Age: 50
End: 2020-10-04

## 2020-10-04 ENCOUNTER — ANESTHESIA (OUTPATIENT)
Dept: PERIOP | Facility: HOSPITAL | Age: 50
End: 2020-10-04

## 2020-10-04 LAB
BACTERIA SPEC AEROBE CULT: NO GROWTH
GLUCOSE BLDC GLUCOMTR-MCNC: 126 MG/DL (ref 70–130)

## 2020-10-04 PROCEDURE — 0TC78ZZ EXTIRPATION OF MATTER FROM LEFT URETER, VIA NATURAL OR ARTIFICIAL OPENING ENDOSCOPIC: ICD-10-PCS | Performed by: UROLOGY

## 2020-10-04 PROCEDURE — 25010000002 PROPOFOL 10 MG/ML EMULSION: Performed by: ANESTHESIOLOGY

## 2020-10-04 PROCEDURE — 25010000002 CEFTRIAXONE PER 250 MG: Performed by: UROLOGY

## 2020-10-04 PROCEDURE — 25010000002 ONDANSETRON PER 1 MG: Performed by: UROLOGY

## 2020-10-04 PROCEDURE — C2617 STENT, NON-COR, TEM W/O DEL: HCPCS | Performed by: UROLOGY

## 2020-10-04 PROCEDURE — 25010000003 MEPERIDINE PER 100 MG: Performed by: ANESTHESIOLOGY

## 2020-10-04 PROCEDURE — C1769 GUIDE WIRE: HCPCS | Performed by: UROLOGY

## 2020-10-04 PROCEDURE — 25010000002 MIDAZOLAM PER 1 MG: Performed by: ANESTHESIOLOGY

## 2020-10-04 PROCEDURE — 0 IOTHALAMATE 60 % SOLUTION: Performed by: UROLOGY

## 2020-10-04 PROCEDURE — 25010000002 HYDROMORPHONE PER 4 MG: Performed by: UROLOGY

## 2020-10-04 PROCEDURE — 25010000002 FENTANYL CITRATE (PF) 100 MCG/2ML SOLUTION: Performed by: ANESTHESIOLOGY

## 2020-10-04 PROCEDURE — 82962 GLUCOSE BLOOD TEST: CPT

## 2020-10-04 PROCEDURE — C1758 CATHETER, URETERAL: HCPCS | Performed by: UROLOGY

## 2020-10-04 PROCEDURE — 0T788ZZ DILATION OF BILATERAL URETERS, VIA NATURAL OR ARTIFICIAL OPENING ENDOSCOPIC: ICD-10-PCS | Performed by: UROLOGY

## 2020-10-04 PROCEDURE — 74420 UROGRAPHY RTRGR +-KUB: CPT

## 2020-10-04 PROCEDURE — 0TC68ZZ EXTIRPATION OF MATTER FROM RIGHT URETER, VIA NATURAL OR ARTIFICIAL OPENING ENDOSCOPIC: ICD-10-PCS | Performed by: UROLOGY

## 2020-10-04 PROCEDURE — 82365 CALCULUS SPECTROSCOPY: CPT | Performed by: UROLOGY

## 2020-10-04 PROCEDURE — BT141ZZ FLUOROSCOPY OF KIDNEYS, URETERS AND BLADDER USING LOW OSMOLAR CONTRAST: ICD-10-PCS | Performed by: UROLOGY

## 2020-10-04 DEVICE — URETERAL STENT
Type: IMPLANTABLE DEVICE | Site: URETER | Status: FUNCTIONAL
Brand: CONTOUR™

## 2020-10-04 RX ORDER — PHENAZOPYRIDINE HYDROCHLORIDE 200 MG/1
200 TABLET, FILM COATED ORAL 3 TIMES DAILY PRN
Qty: 30 TABLET | Refills: 1 | Status: SHIPPED | OUTPATIENT
Start: 2020-10-04 | End: 2020-11-25

## 2020-10-04 RX ORDER — FAMOTIDINE 10 MG/ML
20 INJECTION, SOLUTION INTRAVENOUS ONCE
Status: COMPLETED | OUTPATIENT
Start: 2020-10-04 | End: 2020-10-04

## 2020-10-04 RX ORDER — OXYCODONE HYDROCHLORIDE AND ACETAMINOPHEN 5; 325 MG/1; MG/1
1 TABLET ORAL EVERY 4 HOURS PRN
Status: DISCONTINUED | OUTPATIENT
Start: 2020-10-04 | End: 2020-10-05 | Stop reason: HOSPADM

## 2020-10-04 RX ORDER — HYDROMORPHONE HYDROCHLORIDE 1 MG/ML
0.5 INJECTION, SOLUTION INTRAMUSCULAR; INTRAVENOUS; SUBCUTANEOUS
Status: DISCONTINUED | OUTPATIENT
Start: 2020-10-04 | End: 2020-10-05 | Stop reason: HOSPADM

## 2020-10-04 RX ORDER — GABAPENTIN 300 MG/1
300 CAPSULE ORAL 2 TIMES DAILY
Status: DISCONTINUED | OUTPATIENT
Start: 2020-10-04 | End: 2020-10-05 | Stop reason: HOSPADM

## 2020-10-04 RX ORDER — MIDAZOLAM HYDROCHLORIDE 1 MG/ML
1 INJECTION INTRAMUSCULAR; INTRAVENOUS
Status: DISCONTINUED | OUTPATIENT
Start: 2020-10-04 | End: 2020-10-04 | Stop reason: HOSPADM

## 2020-10-04 RX ORDER — LEVOTHYROXINE SODIUM 112 UG/1
112 TABLET ORAL
Status: DISCONTINUED | OUTPATIENT
Start: 2020-10-04 | End: 2020-10-05 | Stop reason: HOSPADM

## 2020-10-04 RX ORDER — ONDANSETRON 2 MG/ML
4 INJECTION INTRAMUSCULAR; INTRAVENOUS ONCE AS NEEDED
Status: DISCONTINUED | OUTPATIENT
Start: 2020-10-04 | End: 2020-10-04 | Stop reason: HOSPADM

## 2020-10-04 RX ORDER — SCOLOPAMINE TRANSDERMAL SYSTEM 1 MG/1
1 PATCH, EXTENDED RELEASE TRANSDERMAL ONCE
Status: DISCONTINUED | OUTPATIENT
Start: 2020-10-04 | End: 2020-10-04

## 2020-10-04 RX ORDER — CEFTRIAXONE SODIUM 1 G/50ML
1 INJECTION, SOLUTION INTRAVENOUS EVERY 24 HOURS
Status: DISCONTINUED | OUTPATIENT
Start: 2020-10-04 | End: 2020-10-05 | Stop reason: HOSPADM

## 2020-10-04 RX ORDER — FENTANYL CITRATE 50 UG/ML
50 INJECTION, SOLUTION INTRAMUSCULAR; INTRAVENOUS
Status: DISCONTINUED | OUTPATIENT
Start: 2020-10-04 | End: 2020-10-04 | Stop reason: HOSPADM

## 2020-10-04 RX ORDER — SODIUM CHLORIDE 0.9 % (FLUSH) 0.9 %
3 SYRINGE (ML) INJECTION EVERY 12 HOURS SCHEDULED
Status: DISCONTINUED | OUTPATIENT
Start: 2020-10-04 | End: 2020-10-04 | Stop reason: HOSPADM

## 2020-10-04 RX ORDER — ACETAMINOPHEN 325 MG/1
650 TABLET ORAL ONCE AS NEEDED
Status: DISCONTINUED | OUTPATIENT
Start: 2020-10-04 | End: 2020-10-04 | Stop reason: HOSPADM

## 2020-10-04 RX ORDER — SODIUM CHLORIDE 9 MG/ML
100 INJECTION, SOLUTION INTRAVENOUS CONTINUOUS
Status: DISCONTINUED | OUTPATIENT
Start: 2020-10-04 | End: 2020-10-05 | Stop reason: HOSPADM

## 2020-10-04 RX ORDER — SODIUM CHLORIDE 0.9 % (FLUSH) 0.9 %
3-10 SYRINGE (ML) INJECTION AS NEEDED
Status: DISCONTINUED | OUTPATIENT
Start: 2020-10-04 | End: 2020-10-04 | Stop reason: HOSPADM

## 2020-10-04 RX ORDER — MAGNESIUM HYDROXIDE 1200 MG/15ML
LIQUID ORAL AS NEEDED
Status: DISCONTINUED | OUTPATIENT
Start: 2020-10-04 | End: 2020-10-04 | Stop reason: HOSPADM

## 2020-10-04 RX ORDER — CEPHALEXIN 500 MG/1
500 CAPSULE ORAL 3 TIMES DAILY
Qty: 30 CAPSULE | Refills: 0 | Status: SHIPPED | OUTPATIENT
Start: 2020-10-04 | End: 2020-10-14

## 2020-10-04 RX ORDER — DIPHENHYDRAMINE HYDROCHLORIDE 50 MG/ML
12.5 INJECTION INTRAMUSCULAR; INTRAVENOUS
Status: DISCONTINUED | OUTPATIENT
Start: 2020-10-04 | End: 2020-10-04 | Stop reason: HOSPADM

## 2020-10-04 RX ORDER — ONDANSETRON 2 MG/ML
4 INJECTION INTRAMUSCULAR; INTRAVENOUS EVERY 4 HOURS PRN
Status: DISCONTINUED | OUTPATIENT
Start: 2020-10-04 | End: 2020-10-05 | Stop reason: HOSPADM

## 2020-10-04 RX ORDER — LIDOCAINE HYDROCHLORIDE 20 MG/ML
INJECTION, SOLUTION INFILTRATION; PERINEURAL AS NEEDED
Status: DISCONTINUED | OUTPATIENT
Start: 2020-10-04 | End: 2020-10-04 | Stop reason: SURG

## 2020-10-04 RX ORDER — PROPOFOL 10 MG/ML
VIAL (ML) INTRAVENOUS AS NEEDED
Status: DISCONTINUED | OUTPATIENT
Start: 2020-10-04 | End: 2020-10-04 | Stop reason: SURG

## 2020-10-04 RX ORDER — LIDOCAINE HYDROCHLORIDE 10 MG/ML
0.5 INJECTION, SOLUTION EPIDURAL; INFILTRATION; INTRACAUDAL; PERINEURAL ONCE AS NEEDED
Status: DISCONTINUED | OUTPATIENT
Start: 2020-10-04 | End: 2020-10-04 | Stop reason: HOSPADM

## 2020-10-04 RX ORDER — LABETALOL HYDROCHLORIDE 5 MG/ML
5 INJECTION, SOLUTION INTRAVENOUS
Status: DISCONTINUED | OUTPATIENT
Start: 2020-10-04 | End: 2020-10-04 | Stop reason: HOSPADM

## 2020-10-04 RX ORDER — OXYCODONE HYDROCHLORIDE AND ACETAMINOPHEN 5; 325 MG/1; MG/1
1 TABLET ORAL EVERY 4 HOURS PRN
Qty: 30 TABLET | Refills: 0 | Status: SHIPPED | OUTPATIENT
Start: 2020-10-04 | End: 2020-11-25

## 2020-10-04 RX ORDER — MEPERIDINE HYDROCHLORIDE 25 MG/ML
12.5 INJECTION INTRAMUSCULAR; INTRAVENOUS; SUBCUTANEOUS
Status: DISCONTINUED | OUTPATIENT
Start: 2020-10-04 | End: 2020-10-04 | Stop reason: HOSPADM

## 2020-10-04 RX ORDER — SERTRALINE HYDROCHLORIDE 100 MG/1
100 TABLET, FILM COATED ORAL DAILY
Status: DISCONTINUED | OUTPATIENT
Start: 2020-10-04 | End: 2020-10-05 | Stop reason: HOSPADM

## 2020-10-04 RX ORDER — SODIUM CHLORIDE, SODIUM LACTATE, POTASSIUM CHLORIDE, CALCIUM CHLORIDE 600; 310; 30; 20 MG/100ML; MG/100ML; MG/100ML; MG/100ML
9 INJECTION, SOLUTION INTRAVENOUS CONTINUOUS
Status: DISCONTINUED | OUTPATIENT
Start: 2020-10-04 | End: 2020-10-04

## 2020-10-04 RX ADMIN — PROPOFOL 50 MG: 10 INJECTION, EMULSION INTRAVENOUS at 19:51

## 2020-10-04 RX ADMIN — FENTANYL CITRATE 50 MCG: 50 INJECTION, SOLUTION INTRAMUSCULAR; INTRAVENOUS at 21:09

## 2020-10-04 RX ADMIN — SCOPALAMINE 1 PATCH: 1 PATCH, EXTENDED RELEASE TRANSDERMAL at 19:00

## 2020-10-04 RX ADMIN — LIDOCAINE HYDROCHLORIDE 60 MG: 20 INJECTION, SOLUTION INFILTRATION; PERINEURAL at 19:49

## 2020-10-04 RX ADMIN — HYDROMORPHONE HYDROCHLORIDE 0.5 MG: 1 INJECTION, SOLUTION INTRAMUSCULAR; INTRAVENOUS; SUBCUTANEOUS at 16:46

## 2020-10-04 RX ADMIN — MIDAZOLAM 1 MG: 1 INJECTION INTRAMUSCULAR; INTRAVENOUS at 19:00

## 2020-10-04 RX ADMIN — ONDANSETRON 4 MG: 2 INJECTION INTRAMUSCULAR; INTRAVENOUS at 05:42

## 2020-10-04 RX ADMIN — ONDANSETRON 4 MG: 2 INJECTION INTRAMUSCULAR; INTRAVENOUS at 20:00

## 2020-10-04 RX ADMIN — FENTANYL CITRATE 50 MCG: 50 INJECTION, SOLUTION INTRAMUSCULAR; INTRAVENOUS at 19:00

## 2020-10-04 RX ADMIN — FAMOTIDINE 20 MG: 10 INJECTION, SOLUTION INTRAVENOUS at 19:00

## 2020-10-04 RX ADMIN — GABAPENTIN 300 MG: 300 CAPSULE ORAL at 22:28

## 2020-10-04 RX ADMIN — HYDROMORPHONE HYDROCHLORIDE 0.5 MG: 1 INJECTION, SOLUTION INTRAMUSCULAR; INTRAVENOUS; SUBCUTANEOUS at 05:41

## 2020-10-04 RX ADMIN — SODIUM CHLORIDE 100 ML/HR: 9 INJECTION, SOLUTION INTRAVENOUS at 10:32

## 2020-10-04 RX ADMIN — PROPOFOL 150 MG: 10 INJECTION, EMULSION INTRAVENOUS at 19:49

## 2020-10-04 RX ADMIN — SODIUM CHLORIDE, POTASSIUM CHLORIDE, SODIUM LACTATE AND CALCIUM CHLORIDE 9 ML/HR: 600; 310; 30; 20 INJECTION, SOLUTION INTRAVENOUS at 19:01

## 2020-10-04 RX ADMIN — SODIUM CHLORIDE, PRESERVATIVE FREE 10 ML: 5 INJECTION INTRAVENOUS at 00:54

## 2020-10-04 RX ADMIN — HYDROMORPHONE HYDROCHLORIDE 0.5 MG: 1 INJECTION, SOLUTION INTRAMUSCULAR; INTRAVENOUS; SUBCUTANEOUS at 12:53

## 2020-10-04 RX ADMIN — ONDANSETRON 4 MG: 2 INJECTION INTRAMUSCULAR; INTRAVENOUS at 14:59

## 2020-10-04 RX ADMIN — ONDANSETRON 4 MG: 2 INJECTION INTRAMUSCULAR; INTRAVENOUS at 10:32

## 2020-10-04 RX ADMIN — SODIUM CHLORIDE 100 ML/HR: 9 INJECTION, SOLUTION INTRAVENOUS at 00:54

## 2020-10-04 RX ADMIN — CEFTRIAXONE SODIUM 1 G: 1 INJECTION, SOLUTION INTRAVENOUS at 12:26

## 2020-10-04 RX ADMIN — FENTANYL CITRATE 50 MCG: 50 INJECTION, SOLUTION INTRAMUSCULAR; INTRAVENOUS at 20:59

## 2020-10-04 RX ADMIN — OXYCODONE HYDROCHLORIDE AND ACETAMINOPHEN 1 TABLET: 5; 325 TABLET ORAL at 23:26

## 2020-10-04 RX ADMIN — MEPERIDINE HYDROCHLORIDE 12.5 MG: 25 INJECTION INTRAMUSCULAR; INTRAVENOUS; SUBCUTANEOUS at 21:11

## 2020-10-04 RX ADMIN — HYDROMORPHONE HYDROCHLORIDE 0.5 MG: 1 INJECTION, SOLUTION INTRAMUSCULAR; INTRAVENOUS; SUBCUTANEOUS at 10:32

## 2020-10-04 RX ADMIN — HYDROMORPHONE HYDROCHLORIDE 0.5 MG: 1 INJECTION, SOLUTION INTRAMUSCULAR; INTRAVENOUS; SUBCUTANEOUS at 00:54

## 2020-10-04 RX ADMIN — ONDANSETRON 4 MG: 2 INJECTION INTRAMUSCULAR; INTRAVENOUS at 00:54

## 2020-10-04 NOTE — ANESTHESIA PREPROCEDURE EVALUATION
Anesthesia Evaluation     Patient summary reviewed   history of anesthetic complications: PONV  NPO Solid Status: > 8 hours  NPO Liquid Status: > 2 hours           Airway   Mallampati: III  Neck ROM: full  Small opening  Dental      Pulmonary    Cardiovascular     Rhythm: regular    (+) hyperlipidemia,       Neuro/Psych  (+) psychiatric history,     GI/Hepatic/Renal/Endo    (+)   renal disease stones, diabetes mellitus type 2 well controlled,     Musculoskeletal     Abdominal    Substance History      OB/GYN          Other   arthritis,                      Anesthesia Plan    ASA 2     general     intravenous induction     Anesthetic plan, all risks, benefits, and alternatives have been provided, discussed and informed consent has been obtained with: patient.

## 2020-10-04 NOTE — ANESTHESIA PROCEDURE NOTES
Airway  Urgency: elective    Date/Time: 10/4/2020 7:51 PM  Airway not difficult    General Information and Staff    Patient location during procedure: OR    Indications and Patient Condition    Preoxygenated: yes  MILS maintained throughout  Mask difficulty assessment: 1 - vent by mask    Final Airway Details  Final airway type: supraglottic airway      Successful airway: unique  Size 4    Number of attempts at approach: 1  Assessment: lips, teeth, and gum same as pre-op and atraumatic intubation

## 2020-10-04 NOTE — H&P
[facility] History and Physical    Patient Care Team:  Geovanna Kinney MD as PCP - General  Geovanna Kinney MD as PCP - Family Medicine    Chief complaint flank pain    Subjective     Patient is a 50 y.o. female presents with several day history of severe right-sided flank pain.  Got worse yesterday when she was out in about trying to enjoy the nice weather and had nausea as well.  Pain is worse on the right but is present on the left.  Seen in the emergency room last night.  CT imaging shows bilateral distal ureteral calculi with a 6 mm in the left and a 5 mm on the right with bilateral hydronephrosis worse on the right than the left.  She is had stones in the distant past.  Has not seen urology in some time..  No fevers no chills.  Nausea but without emesis.  No gross hematuria.  No irritable urinary symptoms.    Review of Systems   Pertinent items are noted in HPI, all other systems reviewed and negative    Past Medical History:   Diagnosis Date   • Allergic    • Anxiety    • Depression    • Diabetes mellitus (CMS/HCC)    • Elevated cholesterol    • Hormone replacement therapy    • Hypothyroidism    • PONV (postoperative nausea and vomiting)      Past Surgical History:   Procedure Laterality Date   • BACK SURGERY     •  SECTION     • CHOLECYSTECTOMY     • COLONOSCOPY N/A 2020    Procedure: COLONOSCOPY;  Surgeon: Emory Acosta MD;  Location: [facility] ENDOSCOPY;  Service: Gastroenterology;  Laterality: N/A;  PRE- SCREENING, FAMILY HX COLON CA  POST- NORMAL   • LUMBAR DISCECTOMY Right 2020    Procedure: L3 & L4 MICRO DISCECTOMY WITH METRIX;  Surgeon: Edwin Mcgraw MD;  Location: [facility] MAIN OR;  Service: Neurosurgery;  Laterality: Right;   • TONSILLECTOMY     • TUBAL ABDOMINAL LIGATION       Family History   Problem Relation Age of Onset   • Cancer Mother    • Heart disease Mother    • Arthritis Mother    • COPD Mother    • Heart disease Father    • Diabetes Father      • COPD Father    • Hypertension Father    • Heart disease Brother    • No Known Problems Sister    • No Known Problems Daughter    • No Known Problems Son    • No Known Problems Maternal Grandmother    • No Known Problems Paternal Grandmother    • No Known Problems Maternal Aunt    • No Known Problems Paternal Aunt    • BRCA 1/2 Neg Hx    • Breast cancer Neg Hx    • Colon cancer Neg Hx    • Endometrial cancer Neg Hx    • Ovarian cancer Neg Hx      Social History     Tobacco Use   • Smoking status: Never Smoker   • Smokeless tobacco: Never Used   Substance Use Topics   • Alcohol use: Yes     Comment: rarely on occas   • Drug use: No       Meds:  Medications Prior to Admission   Medication Sig Dispense Refill Last Dose   • levothyroxine (SYNTHROID, LEVOTHROID) 112 MCG tablet Take 112 mcg by mouth Daily. Monday ,Tuesday Thursday, Friday, saturday      • azelastine (ASTEPRO) 0.15 % solution nasal spray 1 spray into the nostril(s) as directed by provider Daily. (Patient taking differently: 1 spray into the nostril(s) as directed by provider Daily As Needed.) 3 each 1    • estradiol (Estrace) 1 MG tablet Take 1 tablet by mouth Daily. (Patient taking differently: Take 1 mg by mouth Every Evening.) 90 tablet 3    • fluticasone (FLONASE) 50 MCG/ACT nasal spray 2 sprays into the nostril(s) as directed by provider Daily. (Patient taking differently: 1 spray by Each Nare route Daily.) 3 bottle 1    • gabapentin (NEURONTIN) 300 MG capsule Take 1 capsule by mouth 2 (two) times a day. (Patient taking differently: Take 300 mg by mouth Every 12 (Twelve) Hours.) 60 capsule 2    • glucose blood (OneTouch Verio) test strip Use as instructed to check blood sugar once daily for diabetes 100 each 3    • levothyroxine (Synthroid) 112 MCG tablet One tablet daily except Wednesday and Sunday when you take 1.5tb (Patient taking differently: Take 112 mcg by mouth Daily. Takes 1.5 on Sunday and wednesday) 114 tablet 3    • medroxyPROGESTERone  "(PROVERA) 2.5 MG tablet TAKE 1 TABLET DAILY (Patient taking differently: Take 2.5 mg by mouth Every Evening.) 30 tablet 2    • metFORMIN (Glucophage) 500 MG tablet Take 1 tablet by mouth Daily With Breakfast. 90 tablet 0    • OneTouch Delica Lancets 33G misc Use to check blood sugar once daily for diabetes e11.9 100 each 3    • rosuvastatin (Crestor) 5 MG tablet Take 1 tablet by mouth Daily. (Patient taking differently: Take 5 mg by mouth Every Night.) 30 tablet 0    • sertraline (ZOLOFT) 100 MG tablet Take 1 tablet by mouth Daily. (Patient taking differently: Take 100 mg by mouth Every Evening.) 90 tablet 1    • tiZANidine (ZANAFLEX) 2 MG tablet Take 1 tablet by mouth Every 8 (Eight) Hours As Needed for Muscle Spasms. 90 tablet 3        Allergies:  Adhesive tape and Chlorhexidine    Debilities:  None    Objective     Vital Signs  Temp:  [97.2 °F (36.2 °C)-98.6 °F (37 °C)] 97.3 °F (36.3 °C)  Heart Rate:  [] 70  Resp:  [16-18] 16  BP: (110-132)/(66-86) 118/75    Intake/Output Summary (Last 24 hours) at 10/4/2020 0959  Last data filed at 10/4/2020 0853  Gross per 24 hour   Intake 1050 ml   Output 700 ml   Net 350 ml     Flowsheet Rows      First Filed Value   Admission Height  157.5 cm (62\") Documented at 10/03/2020 1903   Admission Weight  72.6 kg (160 lb) Documented at 10/03/2020 1903           Physical Exam:     General Appearance:    Alert, cooperative, NAD   HEENT:    No trauma, pupils reactive, hearing intact   Back:     No CVA tenderness   Lungs:     Respirations unlabored, no wheezing    Heart:    RRR, intact peripheral pulses   Abdomen:     Soft, NDNT, no masses, no guarding   :   Deferred   Extremities:   No edema, no deformity   Lymphatic:   No neck or groin LAD   Skin:   No bleeding, bruising or rashes   Neuro/Psych:   Orientation intact, mood/affect pleasant, no focal findings     Results Review:    I reviewed the patient's new clinical results.  Results for orders placed or performed during the " hospital encounter of 10/03/20   Respiratory Panel PCR w/COVID-19(SARS-CoV-2) [facility] In-House, NP Swab in UTM/VTM, 3-4 HR TAT - Swab, Nasopharynx    Specimen: Nasopharynx; Swab   Result Value Ref Range    ADENOVIRUS, PCR Not Detected Not Detected    Coronavirus 229E Not Detected Not Detected    Coronavirus HKU1 Not Detected Not Detected    Coronavirus NL63 Not Detected Not Detected    Coronavirus OC43 Not Detected Not Detected    COVID19 Not Detected Not Detected - Ref. Range    Human Metapneumovirus Not Detected Not Detected    Human Rhinovirus/Enterovirus Not Detected Not Detected    Influenza A PCR Not Detected Not Detected    Influenza A H1 Not Detected Not Detected    Influenza A H1 2009 PCR Not Detected Not Detected    Influenza A H3 Not Detected Not Detected    Influenza B PCR Not Detected Not Detected    Parainfluenza Virus 1 Not Detected Not Detected    Parainfluenza Virus 2 Not Detected Not Detected    Parainfluenza Virus 3 Not Detected Not Detected    Parainfluenza Virus 4 Not Detected Not Detected    RSV, PCR Not Detected Not Detected    Bordetella pertussis pcr Not Detected Not Detected    Bordetella parapertussis PCR Not Detected Not Detected    Chlamydophila pneumoniae PCR Not Detected Not Detected    Mycoplasma pneumo by PCR Not Detected Not Detected   Urine Culture - Urine, Urine, Clean Catch    Specimen: Urine, Clean Catch   Result Value Ref Range    Urine Culture Culture in progress    Comprehensive Metabolic Panel    Specimen: Arm, Left; Blood   Result Value Ref Range    Glucose 145 (H) 65 - 99 mg/dL    BUN 15 6 - 20 mg/dL    Creatinine 0.82 0.57 - 1.00 mg/dL    Sodium 137 136 - 145 mmol/L    Potassium 3.7 3.5 - 5.2 mmol/L    Chloride 98 98 - 107 mmol/L    CO2 27.3 22.0 - 29.0 mmol/L    Calcium 10.3 8.6 - 10.5 mg/dL    Total Protein 6.9 6.0 - 8.5 g/dL    Albumin 4.60 3.50 - 5.20 g/dL    ALT (SGPT) 45 (H) 1 - 33 U/L    AST (SGOT) 39 (H) 1 - 32 U/L    Alkaline Phosphatase 93 39 -  117 U/L    Total Bilirubin 0.5 0.0 - 1.2 mg/dL    eGFR Non African Amer 74 >60 mL/min/1.73    Globulin 2.3 gm/dL    A/G Ratio 2.0 g/dL    BUN/Creatinine Ratio 18.3 7.0 - 25.0    Anion Gap 11.7 5.0 - 15.0 mmol/L   Protime-INR    Specimen: Arm, Left; Blood   Result Value Ref Range    Protime 13.1 11.7 - 14.2 Seconds    INR 1.00 0.90 - 1.10   Urinalysis With Microscopic If Indicated (No Culture) - Urine, Clean Catch    Specimen: Urine, Clean Catch   Result Value Ref Range    Color, UA Yellow Yellow, Straw    Appearance, UA Clear Clear    pH, UA 5.5 5.0 - 8.0    Specific Gravity, UA 1.016 1.005 - 1.030    Glucose, UA Negative Negative    Ketones, UA Negative Negative    Bilirubin, UA Negative Negative    Blood, UA Large (3+) (A) Negative    Protein, UA Trace (A) Negative    Leuk Esterase, UA Trace (A) Negative    Nitrite, UA Negative Negative    Urobilinogen, UA 0.2 E.U./dL 0.2 - 1.0 E.U./dL   Magnesium    Specimen: Arm, Left; Blood   Result Value Ref Range    Magnesium 1.8 1.6 - 2.6 mg/dL   Lipase    Specimen: Arm, Left; Blood   Result Value Ref Range    Lipase 25 13 - 60 U/L   hCG, Serum, Qualitative    Specimen: Arm, Left; Blood   Result Value Ref Range    HCG Qualitative Negative Negative   CBC Auto Differential    Specimen: Arm, Left; Blood   Result Value Ref Range    WBC 10.24 3.40 - 10.80 10*3/mm3    RBC 4.46 3.77 - 5.28 10*6/mm3    Hemoglobin 13.8 12.0 - 15.9 g/dL    Hematocrit 40.2 34.0 - 46.6 %    MCV 90.1 79.0 - 97.0 fL    MCH 30.9 26.6 - 33.0 pg    MCHC 34.3 31.5 - 35.7 g/dL    RDW 12.7 12.3 - 15.4 %    RDW-SD 42.2 37.0 - 54.0 fl    MPV 9.4 6.0 - 12.0 fL    Platelets 284 140 - 450 10*3/mm3    Neutrophil % 67.5 42.7 - 76.0 %    Lymphocyte % 19.0 (L) 19.6 - 45.3 %    Monocyte % 9.3 5.0 - 12.0 %    Eosinophil % 2.9 0.3 - 6.2 %    Basophil % 0.8 0.0 - 1.5 %    Immature Grans % 0.5 0.0 - 0.5 %    Neutrophils, Absolute 6.91 1.70 - 7.00 10*3/mm3    Lymphocytes, Absolute 1.95 0.70 - 3.10 10*3/mm3    Monocytes,  Absolute 0.95 (H) 0.10 - 0.90 10*3/mm3    Eosinophils, Absolute 0.30 0.00 - 0.40 10*3/mm3    Basophils, Absolute 0.08 0.00 - 0.20 10*3/mm3    Immature Grans, Absolute 0.05 0.00 - 0.05 10*3/mm3    nRBC 0.0 0.0 - 0.2 /100 WBC   Urinalysis, Microscopic Only - Urine, Clean Catch    Specimen: Urine, Clean Catch   Result Value Ref Range    RBC, UA Too Numerous to Count (A) None Seen, 0-2 /HPF    WBC, UA 6-12 (A) None Seen, 0-2 /HPF    Bacteria, UA None Seen None Seen /HPF    Squamous Epithelial Cells, UA 0-2 None Seen, 0-2 /HPF    Hyaline Casts, UA 7-12 None Seen /LPF    Methodology Automated Microscopy    POC Glucose Once    Specimen: Blood   Result Value Ref Range    Glucose 126 70 - 130 mg/dL        Assessment:  Bilateral ureteral calculi with obstructive hydronephrosis    Plan:    Patient is admitted for pain management.  We will plan on cystoscopy bilateral ureteroscopy with laser lithotripsy today.  Likely go home as well later today.    I discussed the patients findings and my recommendations with patient.   Risks, complications, outcomes and alternatives discussed with the patient at the bedside and office.    Cm Bennett MD  10/04/20  09:59 EDT

## 2020-10-04 NOTE — PLAN OF CARE
Goal Outcome Evaluation:  Plan of Care Reviewed With: patient  Progress: no change  Outcome Summary: ivf, iv abt given in er, vdg freq-urine strained-no stones found, med for pain/nausea

## 2020-10-05 ENCOUNTER — READMISSION MANAGEMENT (OUTPATIENT)
Dept: CALL CENTER | Facility: HOSPITAL | Age: 50
End: 2020-10-05

## 2020-10-05 VITALS
RESPIRATION RATE: 16 BRPM | OXYGEN SATURATION: 95 % | TEMPERATURE: 97.5 F | HEART RATE: 80 BPM | WEIGHT: 168.8 LBS | DIASTOLIC BLOOD PRESSURE: 61 MMHG | HEIGHT: 62 IN | BODY MASS INDEX: 31.06 KG/M2 | SYSTOLIC BLOOD PRESSURE: 106 MMHG

## 2020-10-05 LAB — GLUCOSE BLDC GLUCOMTR-MCNC: 145 MG/DL (ref 70–130)

## 2020-10-05 PROCEDURE — 82962 GLUCOSE BLOOD TEST: CPT

## 2020-10-05 PROCEDURE — 25010000002 ONDANSETRON PER 1 MG: Performed by: UROLOGY

## 2020-10-05 RX ADMIN — GABAPENTIN 300 MG: 300 CAPSULE ORAL at 08:49

## 2020-10-05 RX ADMIN — LEVOTHYROXINE SODIUM 112 MCG: 112 TABLET ORAL at 05:29

## 2020-10-05 RX ADMIN — ONDANSETRON 4 MG: 2 INJECTION INTRAMUSCULAR; INTRAVENOUS at 00:02

## 2020-10-05 RX ADMIN — OXYCODONE HYDROCHLORIDE AND ACETAMINOPHEN 1 TABLET: 5; 325 TABLET ORAL at 05:29

## 2020-10-05 NOTE — PROGRESS NOTES
Case Management Discharge Note      Final Note: Discharged home. Tara John RN         Selected Continued Care - Discharged on 10/5/2020 Admission date: 10/3/2020 - Discharge disposition: Home or Self Care   Transportation Services  Private: Car    Final Discharge Disposition Code: 01 - home or self-care

## 2020-10-05 NOTE — OUTREACH NOTE
Prep Survey      Responses   [facility] facility patient discharged from?  [place]   Is LACE score < 7 ?  Yes   Eligibility  [facility]   Date of Admission  10/03/20   Date of Discharge  10/05/20   Discharge Disposition  Home or Self Care   Discharge diagnosis  Bilateral ureteral calculi with obstructive hydronephrosis - lithotripsy & stent   Does the patient have one of the following disease processes/diagnoses(primary or secondary)?  Other   Does the patient have Home health ordered?  No   Is there a DME ordered?  No   Prep survey completed?  Yes          Alia Bennett RN

## 2020-10-05 NOTE — OP NOTE
URETEROSCOPY LASER LITHOTRIPSY WITH STENT INSERTION  Procedure Note    Anu Adam  10/4/2020    Pre-op Diagnosis:   Bilateral nephrolithiasis [N20.0]    Post-op Diagnosis:     Post-Op Diagnosis Codes:     * Bilateral nephrolithiasis [N20.0]    Procedure(s):  URETEROSCOPY LASER LITHOTRIPSY WITH bilateral retrogrades, and bilateral STENT INSERTION    Surgeon(s):  Cm Bennett MD    Anesthesia: General    Staff:   Circulator: Lata Anderson RN  Scrub Person: Yael Rivera    Estimated Blood Loss: minimal    Specimens:                Order Name Source Comment Collection Info Order Time   STONE ANALYSIS Ureter, Right  Collected By: Cm Bennett MD 10/4/2020  8:12 PM   STONE ANALYSIS Ureter, Left  Collected By: Cm Bennett MD 10/4/2020  8:12 PM         Drains:   Ureteral Drain/Stent Right ureter 6 Fr. (Active)       Ureteral Drain/Stent Left ureter 6 Fr. (Active)       Findings: Bilateral distal ureteral calculi with hydronephrosis.  Stones ablated and fragments removed.  Bilateral 6 Guatemalan stents placed without tether    Complications: None apparent    Indications: This is a very pleasant 50-year-old female with right flank pain lower pelvic pain found to have bilateral ureteral obstruction.  She now presents for ureteroscopy.    Procedure: Patient was taken off suite given general anesthesia.  Placed in lithotomy position prepped and draped in a sterile fashion with Betadine.  Timeout was performed.  Cystoscopy was performed.  Her bladder was unremarkable.  Urethra was unremarkable.  Bilateral retrograde pyelograms were performed.  Right retrograde pyelogram showed a filling defect in the distal ureter consistent with stone with proximal hydronephrosis.  Left retrograde pyelogram showed similar findings with little more subtle hydronephrosis.  A guidewire was passed up on the right side and the ureter was gently dilated to 10 Guatemalan.  Rigid ureteroscopy was performed.  The stone  was visualized and broken up with the holmium laser.  Fragments were removed with a nitinol 0 tip basket.  A 6 Latvian by 24 cm stent was placed on the right side without a tether.  In a similar fashion the left side was cannulated with a guidewire dilated and then rigid ureteroscopy was performed to the level of the stone.  This was broken up with a holmium laser.  Fragments were removed with a 0 tip basket.  6 and a 6 Latvian by 24 cm stent was then placed without a tether.  She was awoken and taken to recovery in stable condition.  I called her sister care and informed her of the findings.      Cm Bennett MD     Date: 10/4/2020  Time: 20:40 EDT

## 2020-10-05 NOTE — PLAN OF CARE
Goal Outcome Evaluation:  Plan of Care Reviewed With: patient  Progress: improving  Outcome Summary: ivf, vdg blanka urine, med for pain and nausea, prob d/c today

## 2020-10-05 NOTE — ANESTHESIA POSTPROCEDURE EVALUATION
"Patient: Anu Adam    Procedure Summary     Date: 10/04/20 Room / Location: [facility] OR  / [facility] MAIN OR    Anesthesia Start: 1946 Anesthesia Stop: 2042    Procedure: URETEROSCOPY LASER LITHOTRIPSY WITH bilateral retrogrades, and bilateral STENT INSERTION (Bilateral ) Diagnosis:       Bilateral nephrolithiasis      (Bilateral nephrolithiasis [N20.0])    Surgeon: Cm Bennett MD Provider: Trever Bonds MD    Anesthesia Type: general ASA Status: 2          Anesthesia Type: general    Vitals  Vitals Value Taken Time   /82 10/04/20 2115   Temp 36.6 °C (97.8 °F) 10/04/20 2038   Pulse 77 10/04/20 2128   Resp 16 10/04/20 2100   SpO2 97 % 10/04/20 2128   Vitals shown include unvalidated device data.        Post Anesthesia Care and Evaluation    Patient location during evaluation: bedside  Patient participation: complete - patient participated  Level of consciousness: awake  Pain score: 2  Pain management: adequate  Airway patency: patent  Anesthetic complications: No anesthetic complications  PONV Status: none  Cardiovascular status: acceptable  Respiratory status: acceptable  Hydration status: acceptable    Comments: /84   Pulse 83   Temp 36.6 °C (97.8 °F) (Oral)   Resp 16   Ht 157.5 cm (62\")   Wt 76.6 kg (168 lb 12.8 oz)   LMP 11/16/2013 Comment: patient states her last period was six years ago and is post menopausal   SpO2 96%   BMI 30.87 kg/m²         "

## 2020-10-06 ENCOUNTER — TRANSITIONAL CARE MANAGEMENT TELEPHONE ENCOUNTER (OUTPATIENT)
Dept: CALL CENTER | Facility: HOSPITAL | Age: 50
End: 2020-10-06

## 2020-10-06 NOTE — OUTREACH NOTE
Call Center TCM Note      Responses   [facility] patient discharged from?  [place]   Does the patient have one of the following disease processes/diagnoses(primary or secondary)?  Other   TCM attempt successful?  Yes   Discharge diagnosis  Bilateral ureteral calculi with obstructive hydronephrosis - lithotripsy & stent   Meds reviewed with patient/caregiver?  Yes   Is the patient having any side effects they believe may be caused by any medication additions or changes?  No   Does the patient have all medications ordered at discharge?  Yes   Is the patient taking all medications as directed (includes completed medication regime)?  Yes   Does the patient have a primary care provider?   Yes   Does the patient have an appointment with their PCP within 7 days of discharge?  No   Comments regarding PCP  Geovanna Kinney MD   Comments  Pt has fwp with urologist on 10/08/2020, she will determine then if PCP appt warranted at this time.    Has home health visited the patient within 72 hours of discharge?  N/A   Did the patient receive a copy of their discharge instructions?  Yes   Nursing interventions  Reviewed instructions with patient   What is the patient's perception of their health status since discharge?  Improving   Is the patient/caregiver able to teach back signs and symptoms related to disease process for when to call PCP?  Yes   Is the patient/caregiver able to teach back signs and symptoms related to disease process for when to call 911?  Yes   Is the patient/caregiver able to teach back the hierarchy of who to call/visit for symptoms/problems? PCP, Specialist, Home health nurse, Urgent Care, ED, 911  Yes   TCM call completed?  Yes   Wrap up additional comments  Pt doing well s/p lithotripsy for kidney stones. Pt has fwp with urologist 10/08/2020, and will fwp with PCP on her own after this is resolved          Renata Toussaint MA    10/6/2020, 13:11 EDT

## 2020-10-14 LAB
CALCIUM OXALATE DIHYDRATE MFR STONE IR: 45 %
CALCIUM OXALATE DIHYDRATE MFR STONE IR: 73 %
COLOR STONE: NORMAL
COLOR STONE: NORMAL
COM MFR STONE: 17 %
COM MFR STONE: 55 %
COMPN STONE: NORMAL
COMPN STONE: NORMAL
HYDROXYAPATITE 24H ENGDIFF UR: 10 %
LABORATORY COMMENT REPORT: NORMAL
LABORATORY COMMENT REPORT: NORMAL
Lab: NORMAL
PHOTO: NORMAL
PHOTO: NORMAL
SIZE STONE: NORMAL MM
SIZE STONE: NORMAL MM
SPEC SOURCE SUBJ: NORMAL
SPEC SOURCE SUBJ: NORMAL
STONE ANALYSIS-IMP: NORMAL
STONE ANALYSIS-IMP: NORMAL
WT STONE: 11 MG
WT STONE: 28 MG

## 2020-10-30 NOTE — DISCHARGE SUMMARY
Date of Discharge:  10/05/2020    Discharge Diagnosis: Bilateral nephrolithiasis    Presenting Problem/History of Present Illness  Bilateral nephrolithiasis [N20.0]     50-year-old female with bilateral nephrolithiasis admitted for pain management.  Hospital Course  Patient is a 50 y.o. female presented with bilateral stones.  In the ER the evening prior.  Taken to the OR on 10/4/2020 where she underwent bilateral ureteroscopy laser lithotripsy and stent placement.  She was kept around for pain management discharge the following morning..      Procedures Performed  Procedure(s):  URETEROSCOPY LASER LITHOTRIPSY WITH bilateral retrogrades, and bilateral STENT INSERTION       Consults:   Consults     Date and Time Order Name Status Description    10/3/2020 2104 Urology (on-call MD unless specified) Completed           Pertinent Test Results: labs: Routine and radiology: CT scan: Abdomen pelvis with bilateral stones      Condition on Discharge: Improved    Vital Signs       Physical Exam:   No exam performed today,    Discharge Disposition  Home or Self Care    Discharge Medications     Discharge Medications      New Medications      Instructions Start Date   oxyCODONE-acetaminophen 5-325 MG per tablet  Commonly known as: PERCOCET   1 tablet, Oral, Every 4 Hours PRN      phenazopyridine 200 MG tablet  Commonly known as: PYRIDIUM   200 mg, Oral, 3 Times Daily PRN         Changes to Medications      Instructions Start Date   azelastine 0.15 % solution nasal spray  Commonly known as: ASTEPRO  What changed:   · when to take this  · reasons to take this   1 spray, Nasal, Daily      estradiol 1 MG tablet  Commonly known as: Estrace  What changed: when to take this   1 mg, Oral, Daily      fluticasone 50 MCG/ACT nasal spray  Commonly known as: FLONASE  What changed:   · how much to take  · how to take this   2 sprays, Nasal, Daily      gabapentin 300 MG capsule  Commonly known as: NEURONTIN  What changed: when to take  this   300 mg, Oral, 2 times daily      levothyroxine 112 MCG tablet  Commonly known as: SYNTHROID, LEVOTHROID  What changed: Another medication with the same name was changed. Make sure you understand how and when to take each.   112 mcg, Oral, Daily, Monday ,Tuesday Thursday, Friday, saturday       levothyroxine 112 MCG tablet  Commonly known as: Synthroid  What changed:   · how much to take  · how to take this  · when to take this  · additional instructions   One tablet daily except Wednesday and Sunday when you take 1.5tb      medroxyPROGESTERone 2.5 MG tablet  Commonly known as: PROVERA  What changed: when to take this   TAKE 1 TABLET DAILY      rosuvastatin 5 MG tablet  Commonly known as: Crestor  What changed: when to take this   5 mg, Oral, Daily      sertraline 100 MG tablet  Commonly known as: ZOLOFT  What changed: when to take this   100 mg, Oral, Daily         Continue These Medications      Instructions Start Date   glucose blood test strip  Commonly known as: OneTouch Verio   Use as instructed to check blood sugar once daily for diabetes      metFORMIN 500 MG tablet  Commonly known as: Glucophage   500 mg, Oral, Daily With Breakfast      OneTouch Delica Lancets 33G misc   Use to check blood sugar once daily for diabetes e11.9      tiZANidine 2 MG tablet  Commonly known as: ZANAFLEX   2 mg, Oral, Every 8 Hours PRN         ASK your doctor about these medications      Instructions Start Date   cephalexin 500 MG capsule  Commonly known as: KEFLEX  Ask about: Should I take this medication?   500 mg, Oral, 3 Times Daily             Discharge Diet: Regular    Activity at Discharge: As tolerated    Follow-up Appointments  Future Appointments   Date Time Provider Department Center   11/25/2020  7:30 AM Geovanna Kinney MD [id number] None   1/25/2021  2:30 PM Edwin Mcgraw MD [facility]   2/5/2021 11:00 AM [facility] None   2/5/2021 11:30 AM Pratibha Monzon MD [facility]  None     Additional Instructions for the Follow-ups that You Need to Schedule     Discharge Follow-up with Specified Provider: Dr Cm Bennett; 4 Days   As directed      To: Dr Cm Bennett    Follow Up: 4 Days    Follow Up Details: cystoscopy for stent removal in office               Test Results Pending at Discharge       Cm Bennett MD  10/30/20  18:02 EDT    Time: Discharge 20 min

## 2020-11-04 RX ORDER — ATORVASTATIN CALCIUM 10 MG/1
TABLET, FILM COATED ORAL
Qty: 90 TABLET | Refills: 0 | OUTPATIENT
Start: 2020-11-04

## 2020-11-14 ENCOUNTER — RESULTS ENCOUNTER (OUTPATIENT)
Dept: INTERNAL MEDICINE | Facility: CLINIC | Age: 50
End: 2020-11-14

## 2020-11-14 DIAGNOSIS — E11.9 NEW ONSET TYPE 2 DIABETES MELLITUS (HCC): ICD-10-CM

## 2020-11-14 DIAGNOSIS — E78.00 HYPERCHOLESTEROLEMIA: ICD-10-CM

## 2020-11-19 LAB
ALBUMIN SERPL-MCNC: 5 G/DL (ref 3.5–5.2)
ALBUMIN/GLOB SERPL: 2.3 G/DL
ALP SERPL-CCNC: 100 U/L (ref 39–117)
ALT SERPL-CCNC: 42 U/L (ref 1–33)
AST SERPL-CCNC: 35 U/L (ref 1–32)
BILIRUB SERPL-MCNC: 0.6 MG/DL (ref 0–1.2)
BUN SERPL-MCNC: 17 MG/DL (ref 6–20)
BUN/CREAT SERPL: 21.8 (ref 7–25)
CALCIUM SERPL-MCNC: 10.2 MG/DL (ref 8.6–10.5)
CHLORIDE SERPL-SCNC: 98 MMOL/L (ref 98–107)
CHOLEST SERPL-MCNC: 276 MG/DL (ref 0–200)
CO2 SERPL-SCNC: 27.6 MMOL/L (ref 22–29)
CREAT SERPL-MCNC: 0.78 MG/DL (ref 0.57–1)
GLOBULIN SER CALC-MCNC: 2.2 GM/DL
GLUCOSE SERPL-MCNC: 117 MG/DL (ref 65–99)
HBA1C MFR BLD: 6.1 % (ref 4.8–5.6)
HDLC SERPL-MCNC: 39 MG/DL (ref 40–60)
LDLC SERPL CALC-MCNC: 169 MG/DL (ref 0–100)
LDLC/HDLC SERPL: 4.28 {RATIO}
MICROALBUMIN UR-MCNC: 41.4 UG/ML
POTASSIUM SERPL-SCNC: 4.1 MMOL/L (ref 3.5–5.2)
PROT SERPL-MCNC: 7.2 G/DL (ref 6–8.5)
SODIUM SERPL-SCNC: 139 MMOL/L (ref 136–145)
TRIGL SERPL-MCNC: 350 MG/DL (ref 0–150)
VLDLC SERPL CALC-MCNC: 68 MG/DL (ref 5–40)

## 2020-11-25 ENCOUNTER — OFFICE VISIT (OUTPATIENT)
Dept: INTERNAL MEDICINE | Facility: CLINIC | Age: 50
End: 2020-11-25

## 2020-11-25 VITALS
BODY MASS INDEX: 31.28 KG/M2 | TEMPERATURE: 98 F | OXYGEN SATURATION: 94 % | DIASTOLIC BLOOD PRESSURE: 70 MMHG | WEIGHT: 170 LBS | HEART RATE: 95 BPM | SYSTOLIC BLOOD PRESSURE: 110 MMHG | HEIGHT: 62 IN

## 2020-11-25 DIAGNOSIS — E78.00 HYPERCHOLESTEROLEMIA: ICD-10-CM

## 2020-11-25 DIAGNOSIS — E11.65 TYPE 2 DIABETES MELLITUS WITH HYPERGLYCEMIA, WITHOUT LONG-TERM CURRENT USE OF INSULIN (HCC): Primary | ICD-10-CM

## 2020-11-25 DIAGNOSIS — F41.8 MIXED ANXIETY DEPRESSIVE DISORDER: ICD-10-CM

## 2020-11-25 PROCEDURE — 99214 OFFICE O/P EST MOD 30 MIN: CPT | Performed by: FAMILY MEDICINE

## 2020-11-25 RX ORDER — ROSUVASTATIN CALCIUM 5 MG/1
5 TABLET, COATED ORAL NIGHTLY
Qty: 90 TABLET | Refills: 0 | Status: SHIPPED | OUTPATIENT
Start: 2020-11-25 | End: 2021-03-03

## 2020-11-25 RX ORDER — METFORMIN HYDROCHLORIDE 500 MG/1
500 TABLET, EXTENDED RELEASE ORAL
Qty: 90 TABLET | Refills: 0 | Status: SHIPPED | OUTPATIENT
Start: 2020-11-25 | End: 2021-03-03

## 2020-11-25 RX ORDER — SERTRALINE HYDROCHLORIDE 100 MG/1
100 TABLET, FILM COATED ORAL DAILY
Qty: 90 TABLET | Refills: 1 | Status: SHIPPED | OUTPATIENT
Start: 2020-11-25 | End: 2021-06-30 | Stop reason: SDUPTHER

## 2020-11-25 NOTE — PROGRESS NOTES
Subjective   Anu Adam is a 50 y.o. female.   Chief Complaint   Patient presents with   • Diabetes   • Hyperlipidemia   • Anxiety       History of Present Illness     #1  Diabetes type 2-I diagnosed patient at last office visit.  She was prescribed Metformin, but stopped it due to diarrhea and did not take it anymore.  She lost her work shortly thereafter.  She did not do diabetes education, she did not have a time to review in details diabetic folder that I gave her.  Patient did not make any changes in her diet.  Blood sugars at home are in 130s to 140s fasting.  FBS in the lab 117, A1c 6.1, microalbumin 41.4.  She had eye exam in September 2020.  She had flu vaccine this season.    2.  Hyperlipidemia-at last office visit we started patient on statin, but she stopped it because of the abdominal pain.  Now she is not sure if it was because of the statin or because of kidney stones.  She is interested in starting it again.  , HDL 39, triglyceride 350.    3.  Depression with anxiety- on sertraline at 100 mg a day.  She takes it every day.  She has no side effects.  No suicidal ideations, no aggressive behaviors.  She lost her job in pandemic.  Despite of that she looks positive on things.  She is interviewing for a new job.  PHQ-9 is at 5, GAY-7 at 4.    The following portions of the patient's history were reviewed and updated as appropriate: allergies, current medications, past medical history, past social history and problem list.    Review of Systems   Constitutional: Negative for chills and fever.   Respiratory: Negative for cough and shortness of breath.    Cardiovascular: Negative for chest pain.   Musculoskeletal: Negative for myalgias.   Psychiatric/Behavioral: Negative.  Negative for suicidal ideas.         Objective   Wt Readings from Last 3 Encounters:   11/25/20 77.1 kg (170 lb)   10/03/20 76.6 kg (168 lb 12.8 oz)   09/24/20 78 kg (172 lb)      Vitals:    11/25/20 0743   BP: 110/70   Pulse: 95    Temp: 98 °F (36.7 °C)   SpO2: 94%     Temp Readings from Last 3 Encounters:   11/25/20 98 °F (36.7 °C)   10/05/20 97.5 °F (36.4 °C) (Oral)   09/24/20 98.2 °F (36.8 °C) (Temporal)     BP Readings from Last 3 Encounters:   11/25/20 110/70   10/05/20 106/61   09/24/20 138/78     Pulse Readings from Last 3 Encounters:   11/25/20 95   10/05/20 80   09/24/20 74     Body mass index is 31.09 kg/m².    Physical Exam  Constitutional:       Appearance: Normal appearance. She is well-developed.   HENT:      Head: Normocephalic and atraumatic.   Neck:      Musculoskeletal: Neck supple.      Thyroid: No thyromegaly.      Vascular: No carotid bruit.   Cardiovascular:      Rate and Rhythm: Normal rate and regular rhythm.      Heart sounds: Normal heart sounds.   Pulmonary:      Effort: Pulmonary effort is normal.      Breath sounds: Normal breath sounds.   Feet:      Comments: pls see DM foot form which will be scanned.  Skin:     General: Skin is warm and dry.   Neurological:      Mental Status: She is alert and oriented to person, place, and time.   Psychiatric:         Behavior: Behavior normal.         Assessment/Plan   Diagnoses and all orders for this visit:    1. Type 2 diabetes mellitus with hyperglycemia, without long-term current use of insulin (CMS/[facility]) (Primary)  -     Comprehensive Metabolic Panel; Future  -     Hemoglobin A1c; Future  -     Lipid Panel With LDL / HDL Ratio; Future    2. Hypercholesterolemia  -     Comprehensive Metabolic Panel; Future  -     Lipid Panel With LDL / HDL Ratio; Future    3. Mixed anxiety depressive disorder    Other orders  -     rosuvastatin (Crestor) 5 MG tablet; Take 1 tablet by mouth Every Night.  Dispense: 90 tablet; Refill: 0  -     metFORMIN ER (GLUCOPHAGE-XR) 500 MG 24 hr tablet; Take 1 tablet by mouth Daily With Breakfast.  Dispense: 90 tablet; Refill: 0  -     sertraline (ZOLOFT) 100 MG tablet; Take 1 tablet by mouth Daily.  Dispense: 90 tablet; Refill: 1        #1 diabetes  type 2-we are starting Metformin extended release at 500 mg a day.  Patient is advised to review folder on diabetes.  I am giving her information on counting carbs.  Follow-up in 3 months.  She will need hepatitis B vaccine.  2.  Hyperlipidemia-we are starting Crestor at 5 mg a day.  Labs in 3 months before office visit.  3.  Depression anxiety-continue same.  Follow-up in 6 months.

## 2020-11-25 NOTE — PATIENT INSTRUCTIONS
"Carbohydrate Counting for Diabetes Mellitus, Adult    Carbohydrate counting is a method of keeping track of how many carbohydrates you eat. Eating carbohydrates naturally increases the amount of sugar (glucose) in the blood. Counting how many carbohydrates you eat helps keep your blood glucose within normal limits, which helps you manage your diabetes (diabetes mellitus).  It is important to know how many carbohydrates you can safely have in each meal. This is different for every person. A diet and nutrition specialist (registered dietitian) can help you make a meal plan and calculate how many carbohydrates you should have at each meal and snack.  Carbohydrates are found in the following foods:  · Grains, such as breads and cereals.  · Dried beans and soy products.  · Starchy vegetables, such as potatoes, peas, and corn.  · Fruit and fruit juices.  · Milk and yogurt.  · Sweets and snack foods, such as cake, cookies, candy, chips, and soft drinks.  How do I count carbohydrates?  There are two ways to count carbohydrates in food. You can use either of the methods or a combination of both.  Reading \"Nutrition Facts\" on packaged food  The \"Nutrition Facts\" list is included on the labels of almost all packaged foods and beverages in the [place] It includes:  · The serving size.  · Information about nutrients in each serving, including the grams (g) of carbohydrate per serving.  To use the “Nutrition Facts\":  · Decide how many servings you will have.  · Multiply the number of servings by the number of carbohydrates per serving.  · The resulting number is the total amount of carbohydrates that you will be having.  Learning standard serving sizes of other foods  When you eat carbohydrate foods that are not packaged or do not include \"Nutrition Facts\" on the label, you need to measure the servings in order to count the amount of carbohydrates:  · Measure the foods that you will eat with a food scale or measuring cup, if " needed.  · Decide how many standard-size servings you will eat.  · Multiply the number of servings by 15. Most carbohydrate-rich foods have about 15 g of carbohydrates per serving.  ? For example, if you eat 8 oz (170 g) of strawberries, you will have eaten 2 servings and 30 g of carbohydrates (2 servings x 15 g = 30 g).  · For foods that have more than one food mixed, such as soups and casseroles, you must count the carbohydrates in each food that is included.  The following list contains standard serving sizes of common carbohydrate-rich foods. Each of these servings has about 15 g of carbohydrates:  · ½ hamburger bun or ½ English muffin.  · ½ oz (15 mL) syrup.  · ½ oz (14 g) jelly.  · 1 slice of bread.  · 1 six-inch tortilla.  · 3 oz (85 g) cooked rice or pasta.  · 4 oz (113 g) cooked dried beans.  · 4 oz (113 g) starchy vegetable, such as peas, corn, or potatoes.  · 4 oz (113 g) hot cereal.  · 4 oz (113 g) mashed potatoes or ¼ of a large baked potato.  · 4 oz (113 g) canned or frozen fruit.  · 4 oz (120 mL) fruit juice.  · 4-6 crackers.  · 6 chicken nuggets.  · 6 oz (170 g) unsweetened dry cereal.  · 6 oz (170 g) plain fat-free yogurt or yogurt sweetened with artificial sweeteners.  · 8 oz (240 mL) milk.  · 8 oz (170 g) fresh fruit or one small piece of fruit.  · 24 oz (680 g) popped popcorn.  Example of carbohydrate counting  Sample meal  · 3 oz (85 g) chicken breast.  · 6 oz (170 g) brown rice.  · 4 oz (113 g) corn.  · 8 oz (240 mL) milk.  · 8 oz (170 g) strawberries with sugar-free whipped topping.  Carbohydrate calculation  1. Identify the foods that contain carbohydrates:  ? Rice.  ? Corn.  ? Milk.  ? Strawberries.  2. Calculate how many servings you have of each food:  ? 2 servings rice.  ? 1 serving corn.  ? 1 serving milk.  ? 1 serving strawberries.  3. Multiply each number of servings by 15 g:  ? 2 servings rice x 15 g = 30 g.  ? 1 serving corn x 15 g = 15 g.  ? 1 serving milk x 15 g = 15 g.  ? 1  serving strawberries x 15 g = 15 g.  4. Add together all of the amounts to find the total grams of carbohydrates eaten:  ? 30 g + 15 g + 15 g + 15 g = 75 g of carbohydrates total.  Summary  · Carbohydrate counting is a method of keeping track of how many carbohydrates you eat.  · Eating carbohydrates naturally increases the amount of sugar (glucose) in the blood.  · Counting how many carbohydrates you eat helps keep your blood glucose within normal limits, which helps you manage your diabetes.  · A diet and nutrition specialist (registered dietitian) can help you make a meal plan and calculate how many carbohydrates you should have at each meal and snack.  This information is not intended to replace advice given to you by your health care provider. Make sure you discuss any questions you have with your health care provider.  Document Revised: 07/12/2018 Document Reviewed: 05/31/2017  Elsevier Patient Education © 2020 [employer]

## 2020-12-01 ENCOUNTER — TELEPHONE (OUTPATIENT)
Dept: OBSTETRICS AND GYNECOLOGY | Age: 50
End: 2020-12-01

## 2020-12-01 RX ORDER — MEDROXYPROGESTERONE ACETATE 2.5 MG/1
2.5 TABLET ORAL DAILY
Qty: 30 TABLET | Refills: 2 | Status: SHIPPED | OUTPATIENT
Start: 2020-12-01 | End: 2021-03-03

## 2020-12-01 RX ORDER — ESTRADIOL 1 MG/1
1 TABLET ORAL DAILY
Qty: 90 TABLET | Refills: 1 | Status: SHIPPED | OUTPATIENT
Start: 2020-12-01 | End: 2021-05-27 | Stop reason: SDUPTHER

## 2020-12-01 NOTE — TELEPHONE ENCOUNTER
(Nicolás Pt)       Pt called stating she needs her prescriptions refilled and called into the [facility] pharmacy.     Estradiol 1MG tablet   Medroxy(Provera) 2.5MG Tablet    Please advise     Pharmacy verified     [phone]

## 2020-12-08 ENCOUNTER — TELEPHONE (OUTPATIENT)
Dept: NEUROSURGERY | Facility: CLINIC | Age: 50
End: 2020-12-08

## 2020-12-08 NOTE — TELEPHONE ENCOUNTER
THE PATIENT IS SCHEDULED FOR A TELEPHONE VISIT WITH DR. MG ON 1/25, BUT SHE NEEDS TO RESCHEDULE BECAUSE SHE IS CHANGING JOBS AND HER INSURANCE WILL BE CHANGED. HER NEW INSURANCE GOES INTO EFFECT 2/1/21, SHE WANTS TO BE RESCHEDULED FOR ANY TIME AFTER THAT.    PLEASE ADVISE. THE PATIENT CAN BE REACHED [phone]    THANK YOU!

## 2021-02-05 NOTE — PROGRESS NOTES
Subjective   Patient ID: Anu Adam is a 51 y.o. female is here today as a tele visit. She was last seen 9/24/2020 after surgery which was a L3-4 Microdisectomy done 2/23/2020.    Today her symptoms are right leg pain.  Patient is out of her Gabapentin. Numbness and tingling are present . Patient states the pain starts in her low back.    You have chosen to receive care through a telephone visit. Do you consent to use a telephone visit for your medical care today? Yes    This was a televisit from my office lasting 8 minutes.  The patient was at work.  She started a new job.  She ran out of her gabapentin and has been off of it for about 4 days so it was refilled at 300 mg twice daily.  She knows when she is off the medication.  Generally doing well.  But she needs the gabapentin to stay functional.  We will continue to refill it as also the case with the muscle relaxant.  We will do a televisit in 6 months.    Back Pain  The pain is present in the lumbar spine. The quality of the pain is described as stabbing. The pain radiates to the right thigh, right foot and right knee. The pain is at a severity of 4/10. The pain is worse during the night. The symptoms are aggravated by sitting and bending. Associated symptoms include leg pain, numbness, tingling and weakness. Pertinent negatives include no fever. She has tried home exercises, ice and heat for the symptoms. The treatment provided mild relief.       The following portions of the patient's history were reviewed and updated as appropriate: allergies, current medications, past family history, past medical history, past social history, past surgical history and problem list.    Review of Systems   Constitutional: Negative for chills and fever.   HENT: Negative for congestion.    Musculoskeletal: Positive for back pain and gait problem.   Neurological: Positive for tingling, weakness, light-headedness and numbness. Negative for dizziness and syncope.            Objective         Physical Exam   Deferred  Neurologic Exam   Deferred        Assessment/Plan   Independent Review of Radiographic Studies:      I personally reviewed the images from the following studies.    The MRI done today on 6/11/2020 actually looks quite good.  The herniated disc fragment above the L3-L4 disc base has been completely removed.  The discs are well-hydrated with no nerve root compression.  Agree with the report.      Medical Decision Making:      We have refilled the gabapentin at 300 mg twice daily.  We will continue to follow her since I prescribed that and do a televisit in 6 months.  I will refill the muscle relaxants as needed as well.      Diagnoses and all orders for this visit:    1. DDD (degenerative disc disease), lumbar (Primary)    2. History of spinal surgery    3. Neuropathic pain of right lower extremity      Return in about 6 months (around 8/8/2021) for As a televisit.

## 2021-02-08 ENCOUNTER — OFFICE VISIT (OUTPATIENT)
Dept: NEUROSURGERY | Facility: CLINIC | Age: 51
End: 2021-02-08

## 2021-02-08 DIAGNOSIS — M79.2 NEUROPATHIC PAIN OF RIGHT LOWER EXTREMITY: ICD-10-CM

## 2021-02-08 DIAGNOSIS — M51.36 DDD (DEGENERATIVE DISC DISEASE), LUMBAR: Primary | ICD-10-CM

## 2021-02-08 DIAGNOSIS — Z98.890 HISTORY OF SPINAL SURGERY: ICD-10-CM

## 2021-02-08 PROCEDURE — 99441 PR PHYS/QHP TELEPHONE EVALUATION 5-10 MIN: CPT | Performed by: NEUROLOGICAL SURGERY

## 2021-02-08 RX ORDER — GABAPENTIN 300 MG/1
300 CAPSULE ORAL 2 TIMES DAILY
Qty: 60 CAPSULE | Refills: 2 | Status: SHIPPED | OUTPATIENT
Start: 2021-02-08 | End: 2021-05-25

## 2021-03-03 RX ORDER — METFORMIN HYDROCHLORIDE 500 MG/1
500 TABLET, EXTENDED RELEASE ORAL
Qty: 30 TABLET | Refills: 0 | Status: SHIPPED | OUTPATIENT
Start: 2021-03-03 | End: 2021-03-05 | Stop reason: SDUPTHER

## 2021-03-03 RX ORDER — ROSUVASTATIN CALCIUM 5 MG/1
TABLET, COATED ORAL
Qty: 30 TABLET | Refills: 0 | Status: SHIPPED | OUTPATIENT
Start: 2021-03-03 | End: 2021-03-05 | Stop reason: SDUPTHER

## 2021-03-03 RX ORDER — MEDROXYPROGESTERONE ACETATE 2.5 MG/1
TABLET ORAL
Qty: 30 TABLET | Refills: 2 | Status: SHIPPED | OUTPATIENT
Start: 2021-03-03 | End: 2021-05-27 | Stop reason: SDUPTHER

## 2021-03-05 ENCOUNTER — OFFICE VISIT (OUTPATIENT)
Dept: INTERNAL MEDICINE | Facility: CLINIC | Age: 51
End: 2021-03-05

## 2021-03-05 VITALS
DIASTOLIC BLOOD PRESSURE: 70 MMHG | SYSTOLIC BLOOD PRESSURE: 120 MMHG | TEMPERATURE: 97 F | HEART RATE: 72 BPM | BODY MASS INDEX: 31.65 KG/M2 | WEIGHT: 172 LBS | OXYGEN SATURATION: 97 % | HEIGHT: 62 IN

## 2021-03-05 DIAGNOSIS — R79.89 LFT ELEVATION: ICD-10-CM

## 2021-03-05 DIAGNOSIS — E11.65 TYPE 2 DIABETES MELLITUS WITH HYPERGLYCEMIA, WITHOUT LONG-TERM CURRENT USE OF INSULIN (HCC): Primary | ICD-10-CM

## 2021-03-05 DIAGNOSIS — E78.00 HYPERCHOLESTEROLEMIA: ICD-10-CM

## 2021-03-05 DIAGNOSIS — E03.9 ACQUIRED HYPOTHYROIDISM: ICD-10-CM

## 2021-03-05 PROCEDURE — 99214 OFFICE O/P EST MOD 30 MIN: CPT | Performed by: FAMILY MEDICINE

## 2021-03-05 RX ORDER — METFORMIN HYDROCHLORIDE 500 MG/1
500 TABLET, EXTENDED RELEASE ORAL
Qty: 90 TABLET | Refills: 0 | Status: SHIPPED | OUTPATIENT
Start: 2021-03-05 | End: 2021-04-02

## 2021-03-05 RX ORDER — ROSUVASTATIN CALCIUM 5 MG/1
5 TABLET, COATED ORAL EVERY EVENING
Qty: 90 TABLET | Refills: 0 | Status: SHIPPED | OUTPATIENT
Start: 2021-03-05 | End: 2021-04-05

## 2021-03-05 NOTE — PROGRESS NOTES
Subjective   Anu Adam is a 51 y.o. female.   Chief Complaint   Patient presents with   • Diabetes   • Hyperlipidemia   • LFTs elevation       History of Present Illness     #1  Diabetes type 2-diagnosed in 8/2020.  Patient did not do diabetes education yet.  She reviewed folder on diabetes that I gave her, but she did not improve her diet yet.  She has a new job.  She does not cook.  She eats more fast food since she works.  She still drinks 1-2 sodas a day.  She started to exercise.  She walks 2-3 times a day week for about 30 minutes.  She is on Metformin extended release at 500 mg a day.  We started it at last office visit.  She has no side effects.  No diarrhea.  She had diarrhea on Metformin extended release.    Morning sugars at home are in 150s to 170s.  Afternoon sugars between 80 and 120.  , A1c at 6.6 from 6.1.  She had eye exam in September 2020.  She had flu vaccine this season.      2.  Hyperlipidemia-patient is on Crestor at 5 mg a day.  She takes it every day.  She has no side effects.  No abdominal pain.  No muscle aches. LDL 81 from 169, HDL 38.     3.  LFTs elevation-AST 38, ALT 44.  It is stable.  Patient had hepatitis panel done in August 2020 and it was negative.  She had abdominal CT done in February 2020 and it was positive for hepatic steatosis.  BMI 31.5.    She had Covid vaccine #1.    The following portions of the patient's history were reviewed and updated as appropriate: allergies, current medications, past medical history, past social history and problem list.    Review of Systems   Constitutional: Negative.    Respiratory: Negative.    Cardiovascular: Negative.    Psychiatric/Behavioral: Negative.          Objective   Wt Readings from Last 3 Encounters:   03/05/21 78 kg (172 lb)   11/25/20 77.1 kg (170 lb)   10/03/20 76.6 kg (168 lb 12.8 oz)      Vitals:    03/05/21 0712   BP: 120/70   Pulse: 72   Temp: 97 °F (36.1 °C)   SpO2: 97%     Temp Readings from Last 3 Encounters:    03/05/21 97 °F (36.1 °C)   11/25/20 98 °F (36.7 °C)   10/05/20 97.5 °F (36.4 °C) (Oral)     BP Readings from Last 3 Encounters:   03/05/21 120/70   11/25/20 110/70   10/05/20 106/61     Pulse Readings from Last 3 Encounters:   03/05/21 72   11/25/20 95   10/05/20 80     Body mass index is 31.45 kg/m².    Physical Exam  Constitutional:       Appearance: She is well-developed. She is obese.   HENT:      Head: Normocephalic and atraumatic.   Neck:      Musculoskeletal: Neck supple.      Thyroid: No thyromegaly.      Vascular: No carotid bruit.   Cardiovascular:      Rate and Rhythm: Normal rate and regular rhythm.      Heart sounds: Normal heart sounds.   Pulmonary:      Effort: Pulmonary effort is normal.      Breath sounds: Normal breath sounds.   Skin:     General: Skin is warm and dry.   Neurological:      Mental Status: She is alert and oriented to person, place, and time.   Psychiatric:         Behavior: Behavior normal.         Assessment/Plan   Diagnoses and all orders for this visit:    1. Type 2 diabetes mellitus with hyperglycemia, without long-term current use of insulin (CMS/[facility]) (Primary)  -     Hemoglobin A1c; Future  -     MicroAlbumin, Urine, Random - Urine, Clean Catch; Future  -     Basic Metabolic Panel; Future    2. Hypercholesterolemia  -     Lipid Panel With LDL / HDL Ratio; Future    3. Acquired hypothyroidism  -     Thyroid Cascade Profile; Future    4. LFT elevation    Other orders  -     rosuvastatin (CRESTOR) 5 MG tablet; Take 1 tablet by mouth Every Evening.  Dispense: 90 tablet; Refill: 0  -     metFORMIN ER (GLUCOPHAGE-XR) 500 MG 24 hr tablet; Take 1 tablet by mouth Daily With Breakfast.  Dispense: 90 tablet; Refill: 0        #1 diabetes type 2-worse, despite of starting Metformin.  She is advised to schedule diabetes education.  She is advised to work on her diet.  She should stop drinking soda.  Limit fast food.  Work on decreasing carbs in diet.  Increase exercise at least 30 his  day, 5 days a week.  Follow-up in 3 months.  She will need hepatitis B vaccine, cannot do today because she is in the middle of getting vaccination for Covid.    2.  Hyperlipidemia-better, but still LDL above 70.  Patient will work on lifestyle changes and will recheck in 3 months.    3.  LFTs elevation-negative hepatitis panel, liver steatosis on CT.  Patient is advised to work on weight loss.  It can help with improving liver enzymes.  Decrease portion size.  Increase exercise.

## 2021-03-07 NOTE — TELEPHONE ENCOUNTER
She has Valium to take for her claustrophobia,please sign off on the one Percocet for her to take prior to MRI.  Patient has been informed that she can take the one Percocet 30 min prior to MRI with the Valium via patient email   Patient seen and examined. Patient is unable to extend thumb and first MCP. She reports normal thumb and finger motion previously. She reports some current pain in the forearm, but is well controlled with pain medication.  No numbness/tingling.    Exam:  5/5 AIN/M/R/U nerves  Unable to extend thumb and 1st finger MCPs, able to flex digits  SILT M/R/U nerves

## 2021-04-02 RX ORDER — METFORMIN HYDROCHLORIDE 500 MG/1
500 TABLET, EXTENDED RELEASE ORAL
Qty: 30 TABLET | Refills: 5 | Status: SHIPPED | OUTPATIENT
Start: 2021-04-02 | End: 2021-06-02

## 2021-04-05 RX ORDER — ROSUVASTATIN CALCIUM 5 MG/1
5 TABLET, COATED ORAL EVERY EVENING
Qty: 90 TABLET | Refills: 1 | Status: SHIPPED | OUTPATIENT
Start: 2021-04-05 | End: 2021-06-30 | Stop reason: SDUPTHER

## 2021-04-05 RX ORDER — TIZANIDINE 2 MG/1
2 TABLET ORAL EVERY 8 HOURS PRN
Qty: 90 TABLET | Refills: 3 | Status: SHIPPED | OUTPATIENT
Start: 2021-04-05 | End: 2022-01-03

## 2021-04-05 NOTE — TELEPHONE ENCOUNTER
RX refill request from pharmacy    Patient was last seen: 02-08-21    Patients next appointment: 08-09-21

## 2021-05-12 ENCOUNTER — E-VISIT (OUTPATIENT)
Dept: FAMILY MEDICINE CLINIC | Facility: TELEHEALTH | Age: 51
End: 2021-05-12

## 2021-05-12 DIAGNOSIS — R35.0 URINARY FREQUENCY: ICD-10-CM

## 2021-05-12 DIAGNOSIS — J01.40 ACUTE NON-RECURRENT PANSINUSITIS: Primary | ICD-10-CM

## 2021-05-12 PROCEDURE — 99422 OL DIG E/M SVC 11-20 MIN: CPT | Performed by: NURSE PRACTITIONER

## 2021-05-12 RX ORDER — AMOXICILLIN AND CLAVULANATE POTASSIUM 875; 125 MG/1; MG/1
1 TABLET, FILM COATED ORAL EVERY 12 HOURS SCHEDULED
Qty: 14 TABLET | Refills: 0 | Status: SHIPPED | OUTPATIENT
Start: 2021-05-12 | End: 2021-05-19

## 2021-05-12 NOTE — PROGRESS NOTES
I have reviewed the e-Visit questionnaire and patient's answers, and my assessment and plan are as follows:    HPI  Anu Adam is a 51 y.o. female  presents with complaint of sinus pain, HA, ear pain, green nasal discharge. Does have increased pain when pressing on face around eyes and cheeks. ALso reports some urinary frequency, chills, and body aches. Denies covid/flu exposure, urinary burning/pain, hematuria.    Review of Systems - Negative except those listed in the HPI.      Diagnoses and all orders for this visit:    1. Acute non-recurrent pansinusitis (Primary)  -     amoxicillin-clavulanate (Augmentin) 875-125 MG per tablet; Take 1 tablet by mouth Every 12 (Twelve) Hours for 7 days.  Dispense: 14 tablet; Refill: 0    2. Urinary frequency  -     amoxicillin-clavulanate (Augmentin) 875-125 MG per tablet; Take 1 tablet by mouth Every 12 (Twelve) Hours for 7 days.  Dispense: 14 tablet; Refill: 0          FOLLOW-UP  If symptoms worsen or fail to improve, follow-up with PCP/Urgent Care/Emergency Department.      Time Documentation  Counseled patient  Counseling topics: diagnosis and treatment options  Total encounter time: counseling time more than 50% of visit: 15 minutes        VENKATA Gtz  05/12/21  14:50 EDT

## 2021-05-12 NOTE — PATIENT INSTRUCTIONS
Urinary Tract Infection, Adult    A urinary tract infection (UTI) is an infection of any part of the urinary tract. The urinary tract includes the kidneys, ureters, bladder, and urethra. These organs make, store, and get rid of urine in the body.  Your health care provider may use other names to describe the infection. An upper UTI affects the ureters and kidneys (pyelonephritis). A lower UTI affects the bladder (cystitis) and urethra (urethritis).  What are the causes?  Most urinary tract infections are caused by bacteria in your genital area, around the entrance to your urinary tract (urethra). These bacteria grow and cause inflammation of your urinary tract.  What increases the risk?  You are more likely to develop this condition if:  · You have a urinary catheter that stays in place (indwelling).  · You are not able to control when you urinate or have a bowel movement (you have incontinence).  · You are female and you:  ? Use a spermicide or diaphragm for birth control.  ? Have low estrogen levels.  ? Are pregnant.  · You have certain genes that increase your risk (genetics).  · You are sexually active.  · You take antibiotic medicines.  · You have a condition that causes your flow of urine to slow down, such as:  ? An enlarged prostate, if you are male.  ? Blockage in your urethra (stricture).  ? A kidney stone.  ? A nerve condition that affects your bladder control (neurogenic bladder).  ? Not getting enough to drink, or not urinating often.  · You have certain medical conditions, such as:  ? Diabetes.  ? A weak disease-fighting system (immunesystem).  ? Sickle cell disease.  ? Gout.  ? Spinal cord injury.  What are the signs or symptoms?  Symptoms of this condition include:  · Needing to urinate right away (urgently).  · Frequent urination or passing small amounts of urine frequently.  · Pain or burning with urination.  · Blood in the urine.  · Urine that smells bad or unusual.  · Trouble urinating.  · Cloudy  urine.  · Vaginal discharge, if you are female.  · Pain in the abdomen or the lower back.  You may also have:  · Vomiting or a decreased appetite.  · Confusion.  · Irritability or tiredness.  · A fever.  · Diarrhea.  The first symptom in older adults may be confusion. In some cases, they may not have any symptoms until the infection has worsened.  How is this diagnosed?  This condition is diagnosed based on your medical history and a physical exam. You may also have other tests, including:  · Urine tests.  · Blood tests.  · Tests for sexually transmitted infections (STIs).  If you have had more than one UTI, a cystoscopy or imaging studies may be done to determine the cause of the infections.  How is this treated?  Treatment for this condition includes:  · Antibiotic medicine.  · Over-the-counter medicines to treat discomfort.  · Drinking enough water to stay hydrated.  If you have frequent infections or have other conditions such as a kidney stone, you may need to see a health care provider who specializes in the urinary tract (urologist).  In rare cases, urinary tract infections can cause sepsis. Sepsis is a life-threatening condition that occurs when the body responds to an infection. Sepsis is treated in the hospital with IV antibiotics, fluids, and other medicines.  Follow these instructions at home:    Medicines  · Take over-the-counter and prescription medicines only as told by your health care provider.  · If you were prescribed an antibiotic medicine, take it as told by your health care provider. Do not stop using the antibiotic even if you start to feel better.  General instructions  · Make sure you:  ? Empty your bladder often and completely. Do not hold urine for long periods of time.  ? Empty your bladder after sex.  ? Wipe from front to back after a bowel movement if you are female. Use each tissue one time when you wipe.  · Drink enough fluid to keep your urine pale yellow.  · Keep all follow-up  visits as told by your health care provider. This is important.  Contact a health care provider if:  · Your symptoms do not get better after 1-2 days.  · Your symptoms go away and then return.  Get help right away if you have:  · Severe pain in your back or your lower abdomen.  · A fever.  · Nausea or vomiting.  Summary  · A urinary tract infection (UTI) is an infection of any part of the urinary tract, which includes the kidneys, ureters, bladder, and urethra.  · Most urinary tract infections are caused by bacteria in your genital area, around the entrance to your urinary tract (urethra).  · Treatment for this condition often includes antibiotic medicines.  · If you were prescribed an antibiotic medicine, take it as told by your health care provider. Do not stop using the antibiotic even if you start to feel better.  · Keep all follow-up visits as told by your health care provider. This is important.  This information is not intended to replace advice given to you by your health care provider. Make sure you discuss any questions you have with your health care provider.  Document Revised: 12/05/2019 Document Reviewed: 06/27/2019  [employer] Patient Education © 2021 [employer]  Sinusitis, Adult  Sinusitis is inflammation of your sinuses. Sinuses are hollow spaces in the bones around your face. Your sinuses are located:  · Around your eyes.  · In the middle of your forehead.  · Behind your nose.  · In your cheekbones.  Mucus normally drains out of your sinuses. When your nasal tissues become inflamed or swollen, mucus can become trapped or blocked. This allows bacteria, viruses, and fungi to grow, which leads to infection. Most infections of the sinuses are caused by a virus.  Sinusitis can develop quickly. It can last for up to 4 weeks (acute) or for more than 12 weeks (chronic). Sinusitis often develops after a cold.  What are the causes?  This condition is caused by anything that creates swelling in the sinuses or  stops mucus from draining. This includes:  · Allergies.  · Asthma.  · Infection from bacteria or viruses.  · Deformities or blockages in your nose or sinuses.  · Abnormal growths in the nose (nasal polyps).  · Pollutants, such as chemicals or irritants in the air.  · Infection from fungi (rare).  What increases the risk?  You are more likely to develop this condition if you:  · Have a weak body defense system (immune system).  · Do a lot of swimming or diving.  · Overuse nasal sprays.  · Smoke.  What are the signs or symptoms?  The main symptoms of this condition are pain and a feeling of pressure around the affected sinuses. Other symptoms include:  · Stuffy nose or congestion.  · Thick drainage from your nose.  · Swelling and warmth over the affected sinuses.  · Headache.  · Upper toothache.  · A cough that may get worse at night.  · Extra mucus that collects in the throat or the back of the nose (postnasal drip).  · Decreased sense of smell and taste.  · Fatigue.  · A fever.  · Sore throat.  · Bad breath.  How is this diagnosed?  This condition is diagnosed based on:  · Your symptoms.  · Your medical history.  · A physical exam.  · Tests to find out if your condition is acute or chronic. This may include:  ? Checking your nose for nasal polyps.  ? Viewing your sinuses using a device that has a light (endoscope).  ? Testing for allergies or bacteria.  ? Imaging tests, such as an MRI or CT scan.  In rare cases, a bone biopsy may be done to rule out more serious types of fungal sinus disease.  How is this treated?  Treatment for sinusitis depends on the cause and whether your condition is chronic or acute.  · If caused by a virus, your symptoms should go away on their own within 10 days. You may be given medicines to relieve symptoms. They include:  ? Medicines that shrink swollen nasal passages (topical intranasal decongestants).  ? Medicines that treat allergies (antihistamines).  ? A spray that eases inflammation  of the nostrils (topical intranasal corticosteroids).  ? Rinses that help get rid of thick mucus in your nose (nasal saline washes).  · If caused by bacteria, your health care provider may recommend waiting to see if your symptoms improve. Most bacterial infections will get better without antibiotic medicine. You may be given antibiotics if you have:  ? A severe infection.  ? A weak immune system.  · If caused by narrow nasal passages or nasal polyps, you may need to have surgery.  Follow these instructions at home:  Medicines  · Take, use, or apply over-the-counter and prescription medicines only as told by your health care provider. These may include nasal sprays.  · If you were prescribed an antibiotic medicine, take it as told by your health care provider. Do not stop taking the antibiotic even if you start to feel better.  Hydrate and humidify    · Drink enough fluid to keep your urine pale yellow. Staying hydrated will help to thin your mucus.  · Use a cool mist humidifier to keep the humidity level in your home above 50%.  · Inhale steam for 10-15 minutes, 3-4 times a day, or as told by your health care provider. You can do this in the bathroom while a hot shower is running.  · Limit your exposure to cool or dry air.  Rest  · Rest as much as possible.  · Sleep with your head raised (elevated).  · Make sure you get enough sleep each night.  General instructions    · Apply a warm, moist washcloth to your face 3-4 times a day or as told by your health care provider. This will help with discomfort.  · Wash your hands often with soap and water to reduce your exposure to germs. If soap and water are not available, use hand .  · Do not smoke. Avoid being around people who are smoking (secondhand smoke).  · Keep all follow-up visits as told by your health care provider. This is important.  Contact a health care provider if:  · You have a fever.  · Your symptoms get worse.  · Your symptoms do not improve  within 10 days.  Get help right away if:  · You have a severe headache.  · You have persistent vomiting.  · You have severe pain or swelling around your face or eyes.  · You have vision problems.  · You develop confusion.  · Your neck is stiff.  · You have trouble breathing.  Summary  · Sinusitis is soreness and inflammation of your sinuses. Sinuses are hollow spaces in the bones around your face.  · This condition is caused by nasal tissues that become inflamed or swollen. The swelling traps or blocks the flow of mucus. This allows bacteria, viruses, and fungi to grow, which leads to infection.  · If you were prescribed an antibiotic medicine, take it as told by your health care provider. Do not stop taking the antibiotic even if you start to feel better.  · Keep all follow-up visits as told by your health care provider. This is important.  This information is not intended to replace advice given to you by your health care provider. Make sure you discuss any questions you have with your health care provider.  Document Revised: 05/20/2019 Document Reviewed: 05/20/2019  [employer] Patient Education © 2021 [employer]

## 2021-05-25 RX ORDER — GABAPENTIN 300 MG/1
CAPSULE ORAL
Qty: 60 CAPSULE | Refills: 5 | Status: SHIPPED | OUTPATIENT
Start: 2021-05-25 | End: 2021-12-03 | Stop reason: SDUPTHER

## 2021-05-25 NOTE — TELEPHONE ENCOUNTER
RX refill request from pharmacy    Patient was last seen: 02-08-21    Patients next appointment:08-09-21

## 2021-05-27 ENCOUNTER — OFFICE VISIT (OUTPATIENT)
Dept: OBSTETRICS AND GYNECOLOGY | Age: 51
End: 2021-05-27

## 2021-05-27 VITALS
BODY MASS INDEX: 31.1 KG/M2 | SYSTOLIC BLOOD PRESSURE: 124 MMHG | DIASTOLIC BLOOD PRESSURE: 82 MMHG | HEIGHT: 62 IN | WEIGHT: 169 LBS

## 2021-05-27 DIAGNOSIS — Z01.419 ENCOUNTER FOR GYNECOLOGICAL EXAMINATION WITHOUT ABNORMAL FINDING: ICD-10-CM

## 2021-05-27 DIAGNOSIS — N95.1 MENOPAUSAL SYMPTOMS: ICD-10-CM

## 2021-05-27 DIAGNOSIS — Z00.00 VISIT FOR ANNUAL HEALTH EXAMINATION: Primary | ICD-10-CM

## 2021-05-27 PROCEDURE — 99396 PREV VISIT EST AGE 40-64: CPT | Performed by: OBSTETRICS & GYNECOLOGY

## 2021-05-27 RX ORDER — MEDROXYPROGESTERONE ACETATE 2.5 MG/1
2.5 TABLET ORAL DAILY
Qty: 90 TABLET | Refills: 3 | Status: SHIPPED | OUTPATIENT
Start: 2021-05-27 | End: 2021-08-02

## 2021-05-27 RX ORDER — ESTRADIOL 1 MG/1
1 TABLET ORAL DAILY
Qty: 90 TABLET | Refills: 3 | Status: SHIPPED | OUTPATIENT
Start: 2021-05-27 | End: 2021-06-08

## 2021-05-27 NOTE — PROGRESS NOTES
Routine Annual Visit    2021    Patient: Anu Adam          MR#:[id number]      Chief Complaint   Patient presents with   • Gynecologic Exam     cc; annual visit today , last pap 2019 neg  , last mammo 19, next mammo scheduled 2021  , colonoscopy 2017 @ dr lainez , wants to discuss side effects from progesterone hot flashes , night sweats .        History of Present Illness    51 y.o. female  who presents for annual exam.   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  Obstetric History:  OB History        1    Para   1    Term   1            AB        Living   1       SAB        TAB        Ectopic        Molar        Multiple        Live Births   1               Menstrual History:     Patient's last menstrual period was 2013 (lmp unknown).       Sexual History:       ________________________________________  Patient Active Problem List   Diagnosis   • Mixed anxiety depressive disorder   • Hypercholesterolemia   • Hyperglycemia   • Hypothyroidism   • Chronic non-seasonal allergic rhinitis   • Family history of GI malignancy   • Acute right-sided low back pain with right-sided sciatica   • Surgical followup visit   • Back spasm   • Herniation of lumbar intervertebral disc with radiculopathy   • DDD (degenerative disc disease), lumbar   • History of spinal surgery   • Bilateral nephrolithiasis   • Neuropathic pain of right lower extremity       Past Medical History:   Diagnosis Date   • Allergic    • Anxiety    • Depression    • Diabetes mellitus (CMS/HCC)    • Elevated cholesterol    • Hormone replacement therapy    • Hypothyroidism    • PONV (postoperative nausea and vomiting)        Past Surgical History:   Procedure Laterality Date   • BACK SURGERY     •   "SECTION     • CHOLECYSTECTOMY  2004   • COLONOSCOPY N/A 7/16/2020    Procedure: COLONOSCOPY;  Surgeon: Emory Acosta MD;  Location: [facility] ENDOSCOPY;  Service: Gastroenterology;  Laterality: N/A;  PRE- SCREENING, FAMILY HX COLON CA  POST- NORMAL   • LUMBAR DISCECTOMY Right 2/23/2020    Procedure: L3 & L4 MICRO DISCECTOMY WITH METRIX;  Surgeon: Edwin Mcgraw MD;  Location: [facility] MAIN OR;  Service: Neurosurgery;  Laterality: Right;   • TONSILLECTOMY     • TUBAL ABDOMINAL LIGATION     • URETEROSCOPY LASER LITHOTRIPSY WITH STENT INSERTION Bilateral 10/4/2020    Procedure: URETEROSCOPY LASER LITHOTRIPSY WITH bilateral retrogrades, and bilateral STENT INSERTION;  Surgeon: Cm Bennett MD;  Location: [facility] MAIN OR;  Service: Urology;  Laterality: Bilateral;       Social History     Tobacco Use   Smoking Status Never Smoker   Smokeless Tobacco Never Used       has a current medication list which includes the following prescription(s): azelastine, estradiol, fluticasone, gabapentin, glucose blood, levothyroxine, medroxyprogesterone, metformin er, onetouch delica lancets 33g, rosuvastatin, sertraline, and tizanidine.  ________________________________________    Current contraception: post menopausal status  History of abnormal Pap smear: no  Family history of Breast cancer: no  Family history of uterine or ovarian cancer: no  Family History of colon cancer/colon polyps: yes - mother  History of abnormal mammogram: no      The following portions of the patient's history were reviewed and updated as appropriate: allergies, current medications, past family history, past medical history, past social history, past surgical history and problem list.    Review of Systems    Pertinent items are noted in HPI.     Objective   Physical Exam    /82   Ht 157.5 cm (62.01\")   Wt 76.7 kg (169 lb)   LMP 11/16/2013 (LMP Unknown) Comment: patient states her last period was six years ago and is post menopausal   " "Breastfeeding No   BMI 30.90 kg/m²    BP Readings from Last 3 Encounters:   05/27/21 124/82   03/05/21 120/70   11/25/20 110/70      Wt Readings from Last 3 Encounters:   05/27/21 76.7 kg (169 lb)   03/05/21 78 kg (172 lb)   11/25/20 77.1 kg (170 lb)      BMI: Estimated body mass index is 30.9 kg/m² as calculated from the following:    Height as of this encounter: 157.5 cm (62.01\").    Weight as of this encounter: 76.7 kg (169 lb).      General:   alert, appears stated age and cooperative   Abdomen: soft, non-tender, without masses or organomegaly   Breast: inspection negative, no nipple discharge or bleeding, no masses or nodularity palpable   Vulva: normal   Vagina: normal mucosa   Cervix: no cervical motion tenderness and no lesions   Uterus: normal size, mobile and non-tender   Adnexa: no mass, fullness, tenderness     Assessment:    1. Normal annual exam   Assessment     ICD-10-CM ICD-9-CM   1. Visit for annual health examination  Z00.00 V70.0   2. Encounter for gynecological examination without abnormal finding  Z01.419 V72.31   3. Menopausal symptoms  N95.1 627.2     Plan:    Plan     [x]  Mammogram request made  []  PAP done  []  Labs:   []  GC/Chl/TV  []  DEXA scan   []  Referral for colonoscopy:       Diagnoses and all orders for this visit:    1. Visit for annual health examination (Primary)    2. Encounter for gynecological examination without abnormal finding    3. Menopausal symptoms    Other orders  -     Cancel: IgP, Aptima HPV  -     medroxyPROGESTERone (PROVERA) 2.5 MG tablet; Take 1 tablet by mouth Daily.  Dispense: 90 tablet; Refill: 3  -     estradiol (Estrace) 1 MG tablet; Take 1 tablet by mouth Daily.  Dispense: 90 tablet; Refill: 3            Counseling:  --Nutrition: Stressed importance of moderation and caloric balance, stressed fresh fruit and vegetables  --Exercise: Stressed the importance of regular exercise. 3-5 times weekly   - Discussed screening mammogram recommendations. "   --Discussed benefits of screening colonoscopy- age 45 unless FH  --Discussed pap smear screening recommendations

## 2021-06-01 DIAGNOSIS — E11.65 TYPE 2 DIABETES MELLITUS WITH HYPERGLYCEMIA, WITHOUT LONG-TERM CURRENT USE OF INSULIN (HCC): ICD-10-CM

## 2021-06-01 DIAGNOSIS — E03.9 ACQUIRED HYPOTHYROIDISM: ICD-10-CM

## 2021-06-01 DIAGNOSIS — E78.00 HYPERCHOLESTEROLEMIA: ICD-10-CM

## 2021-06-02 RX ORDER — METFORMIN HYDROCHLORIDE 500 MG/1
500 TABLET, EXTENDED RELEASE ORAL
Qty: 90 TABLET | Refills: 0 | Status: SHIPPED | OUTPATIENT
Start: 2021-06-02 | End: 2021-06-30 | Stop reason: SDUPTHER

## 2021-06-07 ENCOUNTER — TELEPHONE (OUTPATIENT)
Dept: NEUROSURGERY | Facility: CLINIC | Age: 51
End: 2021-06-07

## 2021-06-07 NOTE — TELEPHONE ENCOUNTER
Pt called stating that she is having severe pain in her lower back and right leg. Pt states that her pain level is at a 8 and it's worse whenever she has to bend down. Pt has a appointment scheduled for 8/9 but thinks she needs to be seen sooner. I did advise the patient that if her pain is unbearable, she needs to go the ER or call her PCP. Please call and advise

## 2021-06-08 RX ORDER — ESTRADIOL 1 MG/1
TABLET ORAL
Qty: 90 TABLET | Refills: 3 | Status: SHIPPED | OUTPATIENT
Start: 2021-06-08 | End: 2022-07-05

## 2021-06-09 ENCOUNTER — OFFICE VISIT (OUTPATIENT)
Dept: INTERNAL MEDICINE | Facility: CLINIC | Age: 51
End: 2021-06-09

## 2021-06-09 VITALS
SYSTOLIC BLOOD PRESSURE: 114 MMHG | OXYGEN SATURATION: 98 % | HEIGHT: 62 IN | TEMPERATURE: 97.5 F | BODY MASS INDEX: 31.28 KG/M2 | WEIGHT: 170 LBS | DIASTOLIC BLOOD PRESSURE: 82 MMHG | HEART RATE: 74 BPM

## 2021-06-09 DIAGNOSIS — R29.898 WEAKNESS OF RIGHT LOWER EXTREMITY: ICD-10-CM

## 2021-06-09 DIAGNOSIS — Z98.890 HISTORY OF SPINAL SURGERY: ICD-10-CM

## 2021-06-09 DIAGNOSIS — M54.16 LUMBAR RADICULOPATHY, RIGHT: Primary | ICD-10-CM

## 2021-06-09 DIAGNOSIS — M51.16 HERNIATION OF LUMBAR INTERVERTEBRAL DISC WITH RADICULOPATHY: ICD-10-CM

## 2021-06-09 DIAGNOSIS — M51.36 DDD (DEGENERATIVE DISC DISEASE), LUMBAR: ICD-10-CM

## 2021-06-09 PROCEDURE — 99213 OFFICE O/P EST LOW 20 MIN: CPT | Performed by: NURSE PRACTITIONER

## 2021-06-09 NOTE — PROGRESS NOTES
"Chief Complaint  Back Pain (Pt c/o Rt LBP that radiates down to rt leg X monday.) and Leg Pain    Subjective          Anu Adam presents to [facility]  History of Present Illness  She is here today as a new patient to me with c/o right lower back pain with radiation down the front of right leg to the right foot. Symptoms began on Monday. Pain worse with position changes, bending forward and prolonged sitting. She has been using ice, pain medicine, tizanidine and gabapentin. Rates pain as 8/10, throbbing.  Positive hx of L3-L4 discectomy in 02/23/2020 for DDD. Seeing Dr. Mcgraw. Currently taking gabapentin 300 mg BID for chronic right leg neuropathic pain. Last MRI performed 06/11/2020.  Denies any fever, chills, SOA, chest pain, bowel or bladder dysfunction, saddle anesthesia, recent trauma.      Objective   Vital Signs:   /82 (BP Location: Left arm, Patient Position: Sitting, Cuff Size: Adult)   Pulse 74   Temp 97.5 °F (36.4 °C)   Ht 157.5 cm (62.01\")   Wt 77.1 kg (170 lb)   SpO2 98%   BMI 31.08 kg/m²     Physical Exam  Constitutional:       Appearance: She is well-developed.   Neck:      Thyroid: No thyroid mass, thyromegaly or thyroid tenderness.      Vascular: No carotid bruit.      Trachea: Trachea normal.   Cardiovascular:      Rate and Rhythm: Normal rate and regular rhythm.      Chest Wall: PMI is not displaced.      Pulses:           Radial pulses are 2+ on the right side and 2+ on the left side.        Dorsalis pedis pulses are 2+ on the right side and 2+ on the left side.        Posterior tibial pulses are 2+ on the right side and 2+ on the left side.      Heart sounds: S1 normal and S2 normal.   Pulmonary:      Effort: Pulmonary effort is normal.      Breath sounds: Normal breath sounds.   Musculoskeletal:      Cervical back: Normal.      Thoracic back: Normal.      Lumbar back: Tenderness and bony tenderness present. No swelling, edema, deformity, signs " of trauma, lacerations or spasms. Decreased range of motion. Positive right straight leg raise test. No scoliosis.      Right lower leg: No edema.      Left lower leg: No edema.      Comments: Decreased strength RLE   Lymphadenopathy:      Head:      Right side of head: No submental, submandibular, tonsillar or occipital adenopathy.      Left side of head: No submental, submandibular, tonsillar or occipital adenopathy.      Cervical: No cervical adenopathy.   Skin:     General: Skin is warm and dry.      Capillary Refill: Capillary refill takes less than 2 seconds.      Nails: There is no clubbing.   Neurological:      Mental Status: She is alert and oriented to person, place, and time.      Motor: Weakness (right lower extremity) present.      Deep Tendon Reflexes:      Reflex Scores:       Patellar reflexes are 1+ on the right side and 2+ on the left side.  Psychiatric:         Attention and Perception: Attention normal.         Mood and Affect: Mood and affect normal.         Speech: Speech normal.         Behavior: Behavior normal.         Thought Content: Thought content normal.         Cognition and Memory: Cognition normal.        Result Review :     Common labs    Common Labsle 10/3/20 10/3/20 11/18/20 11/18/20 11/18/20 11/18/20 2/27/21 2/27/21 2/27/21    1927 1927 1201 1201 1201 1201 0832 0832 0832   Glucose  145 (A)          Glucose   117 (A)    127 (A)     BUN  15 17    18     Creatinine  0.82 0.78    0.84     eGFR Non  Am  74 78    81     eGFR  Am   95    93     Sodium  137 139    139     Potassium  3.7 4.1    4.7     Chloride  98 98    103     Calcium  10.3 10.2    10.0     Total Protein   7.2    7.3     Albumin  4.60 5.00    4.9     Total Bilirubin  0.5 0.6    0.8     Alkaline Phosphatase  93 100    92     AST (SGOT)  39 (A) 35 (A)    44 (A)     ALT (SGPT)  45 (A) 42 (A)    38 (A)     WBC 10.24           Hemoglobin 13.8           Hematocrit 40.2           Platelets 284           Total  Cholesterol    276 (A)     156   Triglycerides    350 (A)     219 (A)   HDL Cholesterol    39 (A)     38 (A)   LDL Cholesterol     169 (A)     81   Hemoglobin A1C     6.10 (A)   6.6 (A)    Microalbumin, Urine      41.4      (A) Abnormal value       Comments are available for some flowsheets but are not being displayed.                     Assessment and Plan    Diagnoses and all orders for this visit:    1. Lumbar radiculopathy, right (Primary)  -     MRI Lumbar Spine With & Without Contrast; Future    2. Herniation of lumbar intervertebral disc with radiculopathy  -     MRI Lumbar Spine With & Without Contrast; Future    3. DDD (degenerative disc disease), lumbar  -     MRI Lumbar Spine With & Without Contrast; Future    4. Weakness of right lower extremity  -     MRI Lumbar Spine With & Without Contrast; Future    5. History of spinal surgery       1. Right side lumbar radiculopathy- MRI lumbar spine ordered secondary to exam today with right lower extremity weakness, diminished patellar reflex, pain with straight leg raise and hx of lumbar discectomy. Continue NSAIDs, tylenol, application of ice/heat, rest, avoiding offending activities. To ER with worsening symptoms.      Follow Up   Return if symptoms worsen or fail to improve, for Next scheduled follow up.  Patient was given instructions and counseling regarding her condition or for health maintenance advice. Please see specific information pulled into the AVS if appropriate.

## 2021-06-15 NOTE — TELEPHONE ENCOUNTER
I talked to MsSerg Kia and she is having the MRI at [facility] on 6/22. She is already scheduled to come in, in August and said she will wait until then. She is already feeling better and does not feel that she needs to come in right away.

## 2021-06-17 RX ORDER — METHYLPREDNISOLONE 4 MG/1
TABLET ORAL
Qty: 1 EACH | Refills: 0 | Status: SHIPPED | OUTPATIENT
Start: 2021-06-17 | End: 2021-06-30

## 2021-06-17 NOTE — TELEPHONE ENCOUNTER
Provider: HARITHA  Caller: DION SALDIVAR  Relationship to Patient: PT/SELF  Pharmacy: [id number] [place], [place] - [address] AT [facility] [address] [facility] [phone] [facility] [phone] FX    Phone Number: [phone]  Reason for Call: PT CALLED BACK AND STATES HER PAIN IS SEVERE. PT STATES PAIN IS IN LOWER BACK RADIATING DOWN FRONT OF RIGHT LEG. PT STATES PAIN HAD LET UP AND THEN RETURNED TODAY REALLY SEVERE.  When was the patient last seen: 02/08/2021 TELEVISIT  When did it start: PT STATES SYMPTOMS STARTED A COUPLE OF WEEKS AGO.  Where is it located: PAIN IS MAINLY IN RIGHT LEG  Characteristics of symptom/severity: CURRENT PAIN LEVEL: 8/10  Timing- Is it constant or intermittent: CONSTANT  What makes it worse: DRIVING, USING RIGHT LEG/FOOT  What makes it better: USING ICE PACK AND REST  What therapies/medications have you tried: GABAPENTIN 300MG 2X DAILY PLUS A MUSCLE RELAXER.    PT HAS MRI SCHEDULED FOR 06/22/2021 AND WOULD LIKE TO SEE DR MG AROUND THIS DATE.     PT IS ALSO REQUESTING SOMETHING FOR PAIN.    PLEASE CONTACT PT REGARDING THIS MATTER, QUESTIONS OR CONCERNS.    THANK YOU!

## 2021-06-17 NOTE — TELEPHONE ENCOUNTER
Please advise the patient that if the pain is so severe she cannot even stand or if she is having severe weakness in her leg or foot, or bowel/bladder incontinence she should go to the emergency room for evaluation.      Advise her to increase the gabapentin that she was prescribed by Dr. Mcgraw on May 25 -she should start taking 1 tablet 3 times a day instead of 1 tablet twice a day.    Also let her know that I have prescribed a Medrol dose pack.  She should start taking the steroid pack right away as prescribed on the package instructions.     Instructed her to call us on Monday if her symptoms are no better.  Also contact [place] and see if we can move the MRI up.

## 2021-06-17 NOTE — TELEPHONE ENCOUNTER
Called patient and informed her that if her legs are too weak to stand or if she has bladder or bowel incontinence, she needs to go to the ED.  I told her that [facility] sent her a MDP and explained it to her.  I also informed her that I am trying to reschedule her MRI sooner.

## 2021-06-24 DIAGNOSIS — M54.16 LUMBAR RADICULOPATHY, RIGHT: ICD-10-CM

## 2021-06-24 DIAGNOSIS — R29.898 WEAKNESS OF RIGHT LOWER EXTREMITY: ICD-10-CM

## 2021-06-24 DIAGNOSIS — M51.16 HERNIATION OF LUMBAR INTERVERTEBRAL DISC WITH RADICULOPATHY: ICD-10-CM

## 2021-06-24 DIAGNOSIS — M51.36 DDD (DEGENERATIVE DISC DISEASE), LUMBAR: ICD-10-CM

## 2021-06-27 LAB
BUN SERPL-MCNC: 14 MG/DL (ref 6–24)
BUN/CREAT SERPL: 19 (ref 9–23)
CALCIUM SERPL-MCNC: 9.3 MG/DL (ref 8.7–10.2)
CHLORIDE SERPL-SCNC: 104 MMOL/L (ref 96–106)
CHOLEST SERPL-MCNC: 141 MG/DL (ref 100–199)
CO2 SERPL-SCNC: 22 MMOL/L (ref 20–29)
CREAT SERPL-MCNC: 0.75 MG/DL (ref 0.57–1)
GLUCOSE SERPL-MCNC: 134 MG/DL (ref 65–99)
HBA1C MFR BLD: 6.7 % (ref 4.8–5.6)
HDLC SERPL-MCNC: 35 MG/DL
LDLC SERPL CALC-MCNC: 79 MG/DL (ref 0–99)
LDLC/HDLC SERPL: 2.3 RATIO (ref 0–3.2)
MICROALBUMIN UR-MCNC: 20.8 UG/ML
POTASSIUM SERPL-SCNC: 4.3 MMOL/L (ref 3.5–5.2)
SODIUM SERPL-SCNC: 141 MMOL/L (ref 134–144)
TRIGL SERPL-MCNC: 158 MG/DL (ref 0–149)
TSH SERPL DL<=0.005 MIU/L-ACNC: 3.34 UIU/ML (ref 0.45–4.5)
VLDLC SERPL CALC-MCNC: 27 MG/DL (ref 5–40)

## 2021-06-30 ENCOUNTER — OFFICE VISIT (OUTPATIENT)
Dept: INTERNAL MEDICINE | Facility: CLINIC | Age: 51
End: 2021-06-30

## 2021-06-30 VITALS
DIASTOLIC BLOOD PRESSURE: 70 MMHG | HEART RATE: 98 BPM | BODY MASS INDEX: 31.1 KG/M2 | WEIGHT: 169 LBS | SYSTOLIC BLOOD PRESSURE: 118 MMHG | HEIGHT: 62 IN | OXYGEN SATURATION: 98 % | TEMPERATURE: 97.3 F

## 2021-06-30 DIAGNOSIS — E03.9 ACQUIRED HYPOTHYROIDISM: ICD-10-CM

## 2021-06-30 DIAGNOSIS — E78.00 HYPERCHOLESTEROLEMIA: ICD-10-CM

## 2021-06-30 DIAGNOSIS — E11.9 CONTROLLED TYPE 2 DIABETES MELLITUS WITHOUT COMPLICATION, WITHOUT LONG-TERM CURRENT USE OF INSULIN (HCC): Primary | ICD-10-CM

## 2021-06-30 DIAGNOSIS — F41.8 MIXED ANXIETY DEPRESSIVE DISORDER: ICD-10-CM

## 2021-06-30 PROCEDURE — 99214 OFFICE O/P EST MOD 30 MIN: CPT | Performed by: FAMILY MEDICINE

## 2021-06-30 RX ORDER — SERTRALINE HYDROCHLORIDE 100 MG/1
100 TABLET, FILM COATED ORAL DAILY
Qty: 90 TABLET | Refills: 1 | Status: SHIPPED | OUTPATIENT
Start: 2021-06-30 | End: 2021-07-07

## 2021-06-30 RX ORDER — ROSUVASTATIN CALCIUM 10 MG/1
10 TABLET, COATED ORAL EVERY EVENING
Qty: 90 TABLET | Refills: 0 | Status: SHIPPED | OUTPATIENT
Start: 2021-06-30 | End: 2021-10-19

## 2021-06-30 RX ORDER — LEVOTHYROXINE SODIUM 112 UG/1
TABLET ORAL
Qty: 114 TABLET | Refills: 3 | Status: SHIPPED | OUTPATIENT
Start: 2021-06-30 | End: 2022-06-28 | Stop reason: SDUPTHER

## 2021-06-30 RX ORDER — METFORMIN HYDROCHLORIDE 500 MG/1
500 TABLET, EXTENDED RELEASE ORAL
Qty: 90 TABLET | Refills: 0 | Status: SHIPPED | OUTPATIENT
Start: 2021-06-30 | End: 2022-01-17

## 2021-06-30 NOTE — PROGRESS NOTES
Subjective   Anu Adam is a 51 y.o. female.   Chief Complaint   Patient presents with   • Hyperlipidemia   • Diabetes       History of Present Illness     #1  Diabetes type 2-diagnosed in 8/2020.  Patient did not do diabetes education yet.    She is planning to do it.  She improved her diet.  She is less soda.  She has it every other day.  Occasional fast food, on average twice a week.  She started walking.  She walks 30 minutes a day.  She was treated with steroids within the last 2 weeks for lower back pain.  Blood sugar prior to starting steroids were in 110 120s.  She is on Metformin extended release at 500 mg a day.  She takes it every day. She has no side effects.  No diarrhea.  She had diarrhea on Metformin  release.      , A1c at  6.7 from 6.6 from 6.1.  Microalbumin 20.8.  She had eye exam in September 2020.  She had flu vaccine this season.      2.  Hyperlipidemia-patient is on Crestor at 5 mg a day.  She takes it every day.  She has no side effects.  No abdominal pain.  No muscle aches. LDL 79, HDL 38.        3.  Depression with anxiety- on sertraline at 100 mg a day.  She takes it every day.  She has no side effects.  No suicidal ideations, no aggressive behaviors. Mood is normal most of the time. anxiety is controlled. PHQ-9 is at 1, GAY-7 at 1.    4. hypothyroidism- patient is on levothyroxine 112 µg a day.  She takes 1 tablet 5 days a week, 1.5 tablet twice a week. She takes it on empty stomach and does not eat for at least an hour after taking it. TSH 3.3.    Back pain is better after steroids.  She is scheduled for regular follow-up with Dr. Augustin in August.    The following portions of the patient's history were reviewed and updated as appropriate: allergies, current medications, past family history, past medical history, past social history, past surgical history and problem list.    Review of Systems   Constitutional: Negative.    Respiratory: Negative.    Cardiovascular:  Negative.    Gastrointestinal: Negative for diarrhea.   Psychiatric/Behavioral: Negative for suicidal ideas.         Objective   Wt Readings from Last 3 Encounters:   06/30/21 76.7 kg (169 lb)   06/09/21 77.1 kg (170 lb)   05/27/21 76.7 kg (169 lb)      Vitals:    06/30/21 0712   BP: 118/70   Pulse: 98   Temp: 97.3 °F (36.3 °C)   SpO2: 98%     Temp Readings from Last 3 Encounters:   06/30/21 97.3 °F (36.3 °C)   06/09/21 97.5 °F (36.4 °C)   03/05/21 97 °F (36.1 °C)     BP Readings from Last 3 Encounters:   06/30/21 118/70   06/09/21 114/82   05/27/21 124/82     Pulse Readings from Last 3 Encounters:   06/30/21 98   06/09/21 74   03/05/21 72     Body mass index is 30.9 kg/m².    Physical Exam  Constitutional:       Appearance: She is well-developed. She is obese.   HENT:      Head: Normocephalic and atraumatic.   Neck:      Thyroid: No thyromegaly.      Vascular: No carotid bruit.   Cardiovascular:      Rate and Rhythm: Normal rate and regular rhythm.      Heart sounds: Normal heart sounds.   Pulmonary:      Effort: Pulmonary effort is normal.      Breath sounds: Normal breath sounds.   Musculoskeletal:      Cervical back: Neck supple.   Skin:     General: Skin is warm and dry.   Neurological:      Mental Status: She is alert and oriented to person, place, and time.   Psychiatric:         Behavior: Behavior normal.         Assessment/Plan   Diagnoses and all orders for this visit:    1. Controlled type 2 diabetes mellitus without complication, without long-term current use of insulin (CMS/[facility]) (Primary)    2. Hypercholesterolemia    3. Acquired hypothyroidism    4. Mixed anxiety depressive disorder    Other orders  -     levothyroxine (Synthroid) 112 MCG tablet; One tablet daily except Wednesday and Sunday when you take 1.5tb  Dispense: 114 tablet; Refill: 3  -     sertraline (ZOLOFT) 100 MG tablet; Take 1 tablet by mouth Daily.  Dispense: 90 tablet; Refill: 1  -     rosuvastatin (CRESTOR) 10 MG tablet; Take 1 tablet  by mouth Every Evening.  Dispense: 90 tablet; Refill: 0  -     metFORMIN ER (GLUCOPHAGE-XR) 500 MG 24 hr tablet; Take 1 tablet by mouth Daily With Breakfast.  Dispense: 90 tablet; Refill: 0  -     Comprehensive Metabolic Panel; Future  -     Hemoglobin A1c; Future  -     Lipid Panel With LDL / HDL Ratio; Future        #1 diabetes type 2-patient is advised to schedule diabetes education.  We will continue current treatment.  Follow-up in 6 months.  2.  Hyperlipidemia-LDL above 70.  We are increasing dose of Crestor to 10 mg a day.  Labs in 3 months before office visit.  3.  Depression with anxiety-continue same.  Follow-up in 6 months.  4.  Hypothyroidism-continue same.  Follow-up in 12 months.

## 2021-07-07 RX ORDER — SERTRALINE HYDROCHLORIDE 100 MG/1
TABLET, FILM COATED ORAL
Qty: 90 TABLET | Refills: 1 | Status: SHIPPED | OUTPATIENT
Start: 2021-07-07 | End: 2022-02-11 | Stop reason: SDUPTHER

## 2021-07-09 ENCOUNTER — E-VISIT (OUTPATIENT)
Dept: FAMILY MEDICINE CLINIC | Facility: TELEHEALTH | Age: 51
End: 2021-07-09

## 2021-07-09 DIAGNOSIS — J06.9 UPPER RESPIRATORY TRACT INFECTION, UNSPECIFIED TYPE: Primary | ICD-10-CM

## 2021-07-09 PROCEDURE — 99422 OL DIG E/M SVC 11-20 MIN: CPT | Performed by: NURSE PRACTITIONER

## 2021-07-09 NOTE — PROGRESS NOTES
Anu Adam    1970  [id number]    I have reviewed the e-Visit questionnaire and patient's answers, my assessment and plan are as follows:      HPI  Anu Adam is a 51 y.o. with 1-4 days of nasal and sinus congestion, sore throat, runny nose, sneezing, body aches and one day of fever with chills. She is taking IBU and nasal spray for relief. She denies exposure or concern about Covid 19 and refuses Covid 19 test today.     Review of Systems - General ROS: negative for - chills or fever  ENT ROS: positive for - nasal congestion, nasal discharge, sneezing and sore throat  Allergy and Immunology ROS: positive for - nasal congestion  Respiratory ROS: positive for - cough  Negative for shortness of breath or wheezing      Diagnoses and all orders for this visit:    1. Upper respiratory tract infection, unspecified type (Primary)    Most often these initial symptoms are associated with a virus or even allergies.  According to guidelines in treating sinus infections, studies show that 50%-67% of sinusitis cases are reportedly attributable to viruses.    Criteria for prescribing antibiotics for sinus infection:  1.  signs or symptoms of sinusitis (cough, congestion, and/or post-nasal drip) that last = 10 days without clinical improvement  2.  worsening signs or symptoms following initial improvement (double sickening)  3.  severe symptoms including fever = 39 degrees C (102 degrees F) and purulent nasal discharge lasting = 3-4 consecutive days  4.  unilateral cheek or maxillary tooth pain and purulent nasal discharge    Continue nasal steroid spray.   Coricidin HBP as directed.   Increase decaffeinated fluids and rest.   Follow up no improvement in 5-7  Days     Any medications prescribed have been sent electronically to   [id number] - [place], [place] - [address] [facility] [address] [facility] [phone] [facility] [phone]   [address]  [facility] [address]  Phone:  [phone] Fax: [phone]      Time Documentation  Counseled patient  Counseling topics: treatment options, follow up plan and return instructions  Total encounter time: counseling time more than 50% of visit: 15 minutes        VENKATA Cano  07/09/21  13:38 EDT

## 2021-07-10 ENCOUNTER — HOSPITAL ENCOUNTER (EMERGENCY)
Facility: HOSPITAL | Age: 51
Discharge: HOME | End: 2021-07-11
Attending: EMERGENCY MEDICINE | Admitting: EMERGENCY MEDICINE

## 2021-07-10 DIAGNOSIS — J06.9 UPPER RESPIRATORY TRACT INFECTION, UNSPECIFIED TYPE: Primary | ICD-10-CM

## 2021-07-10 DIAGNOSIS — R79.89 POSITIVE D DIMER: ICD-10-CM

## 2021-07-10 DIAGNOSIS — R73.9 HYPERGLYCEMIA: ICD-10-CM

## 2021-07-10 LAB
ALBUMIN SERPL-MCNC: 4.2 G/DL (ref 3.5–5.2)
ALBUMIN/GLOB SERPL: 1.8 G/DL
ALP SERPL-CCNC: 92 U/L (ref 39–117)
ALT SERPL W P-5'-P-CCNC: 23 U/L (ref 1–33)
ANION GAP SERPL CALCULATED.3IONS-SCNC: 12.1 MMOL/L (ref 5–15)
AST SERPL-CCNC: 20 U/L (ref 1–32)
BILIRUB SERPL-MCNC: 0.7 MG/DL (ref 0–1.2)
BUN SERPL-MCNC: 15 MG/DL (ref 6–20)
BUN/CREAT SERPL: 19.5 (ref 7–25)
CALCIUM SPEC-SCNC: 9.1 MG/DL (ref 8.6–10.5)
CHLORIDE SERPL-SCNC: 105 MMOL/L (ref 98–107)
CO2 SERPL-SCNC: 22.9 MMOL/L (ref 22–29)
CREAT SERPL-MCNC: 0.77 MG/DL (ref 0.57–1)
D-LACTATE SERPL-SCNC: 2.1 MMOL/L (ref 0.5–2)
DEPRECATED RDW RBC AUTO: 42.1 FL (ref 37–54)
EOSINOPHIL # BLD MANUAL: 0.96 10*3/MM3 (ref 0–0.4)
EOSINOPHIL NFR BLD MANUAL: 8.4 % (ref 0.3–6.2)
ERYTHROCYTE [DISTWIDTH] IN BLOOD BY AUTOMATED COUNT: 12.2 % (ref 12.3–15.4)
GFR SERPL CREATININE-BSD FRML MDRD: 79 ML/MIN/1.73
GLOBULIN UR ELPH-MCNC: 2.4 GM/DL
GLUCOSE SERPL-MCNC: 168 MG/DL (ref 65–99)
HCT VFR BLD AUTO: 42.2 % (ref 34–46.6)
HGB BLD-MCNC: 14 G/DL (ref 12–15.9)
LYMPHOCYTES # BLD MANUAL: 2.04 10*3/MM3 (ref 0.7–3.1)
LYMPHOCYTES NFR BLD MANUAL: 13.7 % (ref 5–12)
LYMPHOCYTES NFR BLD MANUAL: 17.9 % (ref 19.6–45.3)
MCH RBC QN AUTO: 30.8 PG (ref 26.6–33)
MCHC RBC AUTO-ENTMCNC: 33.2 G/DL (ref 31.5–35.7)
MCV RBC AUTO: 93 FL (ref 79–97)
MONOCYTES # BLD AUTO: 1.56 10*3/MM3 (ref 0.1–0.9)
NEUTROPHILS # BLD AUTO: 6.83 10*3/MM3 (ref 1.7–7)
NEUTROPHILS NFR BLD MANUAL: 60 % (ref 42.7–76)
PLAT MORPH BLD: NORMAL
PLATELET # BLD AUTO: 300 10*3/MM3 (ref 140–450)
PMV BLD AUTO: 9.9 FL (ref 6–12)
POTASSIUM SERPL-SCNC: 3.6 MMOL/L (ref 3.5–5.2)
PROT SERPL-MCNC: 6.6 G/DL (ref 6–8.5)
RBC # BLD AUTO: 4.54 10*6/MM3 (ref 3.77–5.28)
RBC MORPH BLD: NORMAL
SODIUM SERPL-SCNC: 140 MMOL/L (ref 136–145)
WBC # BLD AUTO: 11.39 10*3/MM3 (ref 3.4–10.8)
WBC MORPH BLD: NORMAL

## 2021-07-10 PROCEDURE — 87040 BLOOD CULTURE FOR BACTERIA: CPT | Performed by: EMERGENCY MEDICINE

## 2021-07-10 PROCEDURE — 81001 URINALYSIS AUTO W/SCOPE: CPT | Performed by: EMERGENCY MEDICINE

## 2021-07-10 PROCEDURE — U0003 INFECTIOUS AGENT DETECTION BY NUCLEIC ACID (DNA OR RNA); SEVERE ACUTE RESPIRATORY SYNDROME CORONAVIRUS 2 (SARS-COV-2) (CORONAVIRUS DISEASE [COVID-19]), AMPLIFIED PROBE TECHNIQUE, MAKING USE OF HIGH THROUGHPUT TECHNOLOGIES AS DESCRIBED BY CMS-2020-01-R: HCPCS | Performed by: EMERGENCY MEDICINE

## 2021-07-10 PROCEDURE — 83605 ASSAY OF LACTIC ACID: CPT | Performed by: EMERGENCY MEDICINE

## 2021-07-10 PROCEDURE — 36415 COLL VENOUS BLD VENIPUNCTURE: CPT

## 2021-07-10 PROCEDURE — 96360 HYDRATION IV INFUSION INIT: CPT

## 2021-07-10 PROCEDURE — 80053 COMPREHEN METABOLIC PANEL: CPT | Performed by: EMERGENCY MEDICINE

## 2021-07-10 PROCEDURE — 85007 BL SMEAR W/DIFF WBC COUNT: CPT | Performed by: EMERGENCY MEDICINE

## 2021-07-10 PROCEDURE — 99283 EMERGENCY DEPT VISIT LOW MDM: CPT

## 2021-07-10 PROCEDURE — 85025 COMPLETE CBC W/AUTO DIFF WBC: CPT | Performed by: EMERGENCY MEDICINE

## 2021-07-10 PROCEDURE — 84484 ASSAY OF TROPONIN QUANT: CPT | Performed by: EMERGENCY MEDICINE

## 2021-07-10 RX ORDER — SODIUM CHLORIDE 0.9 % (FLUSH) 0.9 %
10 SYRINGE (ML) INJECTION AS NEEDED
Status: DISCONTINUED | OUTPATIENT
Start: 2021-07-10 | End: 2021-07-11 | Stop reason: HOSPADM

## 2021-07-10 RX ORDER — ACETAMINOPHEN 500 MG
1000 TABLET ORAL ONCE
Status: COMPLETED | OUTPATIENT
Start: 2021-07-10 | End: 2021-07-10

## 2021-07-10 RX ADMIN — SODIUM CHLORIDE 1000 ML: 9 INJECTION, SOLUTION INTRAVENOUS at 23:04

## 2021-07-10 RX ADMIN — ACETAMINOPHEN 1000 MG: 500 TABLET, FILM COATED ORAL at 23:21

## 2021-07-11 ENCOUNTER — APPOINTMENT (OUTPATIENT)
Dept: GENERAL RADIOLOGY | Facility: HOSPITAL | Age: 51
End: 2021-07-11

## 2021-07-11 ENCOUNTER — APPOINTMENT (OUTPATIENT)
Dept: CT IMAGING | Facility: HOSPITAL | Age: 51
End: 2021-07-11

## 2021-07-11 VITALS
TEMPERATURE: 99.2 F | RESPIRATION RATE: 20 BRPM | OXYGEN SATURATION: 98 % | HEART RATE: 94 BPM | BODY MASS INDEX: 31.44 KG/M2 | DIASTOLIC BLOOD PRESSURE: 92 MMHG | WEIGHT: 171.96 LBS | SYSTOLIC BLOOD PRESSURE: 140 MMHG

## 2021-07-11 LAB
BACTERIA UR QL AUTO: ABNORMAL /HPF
BILIRUB UR QL STRIP: NEGATIVE
CLARITY UR: CLEAR
COLOR UR: YELLOW
D DIMER PPP FEU-MCNC: 0.74 MCGFEU/ML (ref 0–0.49)
GLUCOSE UR STRIP-MCNC: NEGATIVE MG/DL
HGB UR QL STRIP.AUTO: ABNORMAL
HOLD SPECIMEN: NORMAL
HOLD SPECIMEN: NORMAL
HYALINE CASTS UR QL AUTO: ABNORMAL /LPF
KETONES UR QL STRIP: NEGATIVE
LEUKOCYTE ESTERASE UR QL STRIP.AUTO: ABNORMAL
NITRITE UR QL STRIP: NEGATIVE
PH UR STRIP.AUTO: 6 [PH] (ref 5–8)
PROT UR QL STRIP: NEGATIVE
QT INTERVAL: 355 MS
RBC # UR: ABNORMAL /HPF
REF LAB TEST METHOD: ABNORMAL
SARS-COV-2 RNA RESP QL NAA+PROBE: NOT DETECTED
SP GR UR STRIP: 1.01 (ref 1–1.03)
SQUAMOUS #/AREA URNS HPF: ABNORMAL /HPF
TROPONIN T SERPL-MCNC: <0.01 NG/ML (ref 0–0.03)
UROBILINOGEN UR QL STRIP: ABNORMAL
WBC UR QL AUTO: ABNORMAL /HPF
WHOLE BLOOD HOLD SPECIMEN: NORMAL

## 2021-07-11 PROCEDURE — 0 IOPAMIDOL PER 1 ML: Performed by: EMERGENCY MEDICINE

## 2021-07-11 PROCEDURE — 71275 CT ANGIOGRAPHY CHEST: CPT

## 2021-07-11 PROCEDURE — 85379 FIBRIN DEGRADATION QUANT: CPT | Performed by: EMERGENCY MEDICINE

## 2021-07-11 PROCEDURE — 93005 ELECTROCARDIOGRAM TRACING: CPT | Performed by: EMERGENCY MEDICINE

## 2021-07-11 PROCEDURE — 71045 X-RAY EXAM CHEST 1 VIEW: CPT

## 2021-07-11 PROCEDURE — 93010 ELECTROCARDIOGRAM REPORT: CPT | Performed by: INTERNAL MEDICINE

## 2021-07-11 RX ORDER — BENZONATATE 200 MG/1
200 CAPSULE ORAL 3 TIMES DAILY PRN
Qty: 21 CAPSULE | Refills: 0 | Status: SHIPPED | OUTPATIENT
Start: 2021-07-11 | End: 2021-10-15

## 2021-07-11 RX ORDER — ONDANSETRON 2 MG/ML
8 INJECTION INTRAMUSCULAR; INTRAVENOUS ONCE AS NEEDED
Status: DISCONTINUED | OUTPATIENT
Start: 2021-07-11 | End: 2021-07-11 | Stop reason: HOSPADM

## 2021-07-11 RX ADMIN — IOPAMIDOL 90 ML: 755 INJECTION, SOLUTION INTRAVENOUS at 02:47

## 2021-07-11 NOTE — ED TRIAGE NOTES
.Pt masked on arrival, staff masked    Pt from home via ems, called for cough for several days, feeling feverish/chills, has not been checking her temp

## 2021-07-11 NOTE — ED PROVIDER NOTES
EMERGENCY DEPARTMENT ENCOUNTER  Patient was placed in face mask in first look and the following protective measures were taken unless additional measures were taken and documented below in the ED course. Patient was wearing facemask when I entered the room and throughout our encounter. I wore full protective equipment throughout this patient encounter including a face mask, and gloves. Hand hygiene was performed before donning protective equipment and after removal when leaving the room.    Room Number:  15/15  Date of encounter:  7/11/2021  PCP: Geovanna Kinney MD    HPI:  Context: Anu Adam is a 51 y.o. female who presents to the ED c/o chief complaint of cough and fevers.  Patient reports symptoms began Wednesday evening.  At that time she was having rhinorrhea, sore throat, chills.  Patient began coughing the next day, cough is mainly nonproductive, has had trace mucus in her cough which she attributes to nasal discharge.  Patient denies any nasal congestion.  Patient denies any nausea vomiting, no loss of sense of smell or taste.  Patient does endorse some shortness of breath, coming and going, no shortness of breath at rest.  Patient does endorse subjective fevers, shakes chills and night sweats.  Patient reports that her nephews recently had upper respiratory infection.  Patient denies any known Covid 19 exposure, reports was immunized against COVID-19 in March.  Patient denies any chronic medical problems other than diabetes treated with oral medication.  Patient is non-smoker, denies any history of lung disease.    MEDICAL HISTORY REVIEW  Reviewed in EPIC    PAST MEDICAL HISTORY  Active Ambulatory Problems     Diagnosis Date Noted   • Mixed anxiety depressive disorder 02/03/2016   • Hypercholesterolemia 02/03/2016   • Hyperglycemia 02/03/2016   • Hypothyroidism 02/03/2016   • Chronic non-seasonal allergic rhinitis 10/17/2017   • Family history of GI malignancy 01/27/2020   • Acute right-sided low back  pain with right-sided sciatica 2020   • Surgical followup visit 2020   • Back spasm 2020   • Herniation of lumbar intervertebral disc with radiculopathy 2020   • DDD (degenerative disc disease), lumbar 2020   • History of spinal surgery 2020   • Bilateral nephrolithiasis 10/03/2020   • Neuropathic pain of right lower extremity 2021     Resolved Ambulatory Problems     Diagnosis Date Noted   • Atopic rhinitis 2016     Past Medical History:   Diagnosis Date   • Allergic    • Anxiety    • Depression    • Diabetes mellitus (CMS/HCC)    • Elevated cholesterol    • Hormone replacement therapy    • PONV (postoperative nausea and vomiting)        PAST SURGICAL HISTORY  Past Surgical History:   Procedure Laterality Date   • BACK SURGERY     •  SECTION     • CHOLECYSTECTOMY     • COLONOSCOPY N/A 2020    Procedure: COLONOSCOPY;  Surgeon: Emory Acosta MD;  Location: [facility] ENDOSCOPY;  Service: Gastroenterology;  Laterality: N/A;  PRE- SCREENING, FAMILY HX COLON CA  POST- NORMAL   • LUMBAR DISCECTOMY Right 2020    Procedure: L3 & L4 MICRO DISCECTOMY WITH METRIX;  Surgeon: Edwin Mcgraw MD;  Location: [facility] OR;  Service: Neurosurgery;  Laterality: Right;   • TONSILLECTOMY     • TUBAL ABDOMINAL LIGATION     • URETEROSCOPY LASER LITHOTRIPSY WITH STENT INSERTION Bilateral 10/4/2020    Procedure: URETEROSCOPY LASER LITHOTRIPSY WITH bilateral retrogrades, and bilateral STENT INSERTION;  Surgeon: Cm Bennett MD;  Location: [facility] OR;  Service: Urology;  Laterality: Bilateral;       FAMILY HISTORY  Family History   Problem Relation Age of Onset   • Cancer Mother    • Heart disease Mother    • Arthritis Mother    • COPD Mother    • Heart disease Father    • Diabetes Father    • COPD Father    • Hypertension Father    • Heart disease Brother    • No Known Problems Sister    • No Known Problems Daughter    • No Known Problems Son    • No  Known Problems Maternal Grandmother    • No Known Problems Paternal Grandmother    • No Known Problems Maternal Aunt    • No Known Problems Paternal Aunt    • BRCA 1/2 Neg Hx    • Breast cancer Neg Hx    • Colon cancer Neg Hx    • Endometrial cancer Neg Hx    • Ovarian cancer Neg Hx    • Malig Hyperthermia Neg Hx        SOCIAL HISTORY  Social History     Socioeconomic History   • Marital status: Single     Spouse name: Not on file   • Number of children: Not on file   • Years of education: Not on file   • Highest education level: Not on file   Tobacco Use   • Smoking status: Never Smoker   • Smokeless tobacco: Never Used   Vaping Use   • Vaping Use: Never used   Substance and Sexual Activity   • Alcohol use: Yes     Comment: rarely on occas   • Drug use: No   • Sexual activity: Defer     Birth control/protection: Post-menopausal       ALLERGIES  Adhesive tape and Chlorhexidine    The patient's allergies have been reviewed    REVIEW OF SYSTEMS  All systems reviewed and negative except for those discussed in HPI.     PHYSICAL EXAM  I have reviewed the triage vital signs and nursing notes.  ED Triage Vitals [07/10/21 2236]   Temp Heart Rate Resp BP SpO2   99.2 °F (37.3 °C) 110 22 (!) 145/103 99 %      Temp src Heart Rate Source Patient Position BP Location FiO2 (%)   -- -- -- -- --     General: No acute distress, well-appearing, speaking full sentences, maintaining oxygen saturation on room air without difficulty  HENT: NCAT, PERRL, Nares patent  Eyes: no scleral icterus  Neck: trachea midline, no ROM limitations  CV: regular rhythm, regular rate  Respiratory: normal effort, CTAB, no wheezing rales or rhonchi  Abdomen: soft, nondistended, nontender to palpation, no rebound tenderness, no guarding or rigidity  : deferred  Musculoskeletal: no deformity  Neuro: alert, moves all extremities, follows commands  Skin: warm, dry, no peripheral edema    LAB RESULTS  Recent Results (from the past 24 hour(s))   Comprehensive  Metabolic Panel    Collection Time: 07/10/21 11:00 PM    Specimen: Blood   Result Value Ref Range    Glucose 168 (H) 65 - 99 mg/dL    BUN 15 6 - 20 mg/dL    Creatinine 0.77 0.57 - 1.00 mg/dL    Sodium 140 136 - 145 mmol/L    Potassium 3.6 3.5 - 5.2 mmol/L    Chloride 105 98 - 107 mmol/L    CO2 22.9 22.0 - 29.0 mmol/L    Calcium 9.1 8.6 - 10.5 mg/dL    Total Protein 6.6 6.0 - 8.5 g/dL    Albumin 4.20 3.50 - 5.20 g/dL    ALT (SGPT) 23 1 - 33 U/L    AST (SGOT) 20 1 - 32 U/L    Alkaline Phosphatase 92 39 - 117 U/L    Total Bilirubin 0.7 0.0 - 1.2 mg/dL    eGFR Non African Amer 79 >60 mL/min/1.73    Globulin 2.4 gm/dL    A/G Ratio 1.8 g/dL    BUN/Creatinine Ratio 19.5 7.0 - 25.0    Anion Gap 12.1 5.0 - 15.0 mmol/L   Lactic Acid, Plasma    Collection Time: 07/10/21 11:00 PM    Specimen: Blood   Result Value Ref Range    Lactate 2.1 (C) 0.5 - 2.0 mmol/L   CBC Auto Differential    Collection Time: 07/10/21 11:00 PM    Specimen: Blood   Result Value Ref Range    WBC 11.39 (H) 3.40 - 10.80 10*3/mm3    RBC 4.54 3.77 - 5.28 10*6/mm3    Hemoglobin 14.0 12.0 - 15.9 g/dL    Hematocrit 42.2 34.0 - 46.6 %    MCV 93.0 79.0 - 97.0 fL    MCH 30.8 26.6 - 33.0 pg    MCHC 33.2 31.5 - 35.7 g/dL    RDW 12.2 (L) 12.3 - 15.4 %    RDW-SD 42.1 37.0 - 54.0 fl    MPV 9.9 6.0 - 12.0 fL    Platelets 300 140 - 450 10*3/mm3   Green Top (Gel)    Collection Time: 07/10/21 11:00 PM   Result Value Ref Range    Extra Tube Hold for add-ons.    Lavender Top    Collection Time: 07/10/21 11:00 PM   Result Value Ref Range    Extra Tube hold for add-on    Gold Top - SST    Collection Time: 07/10/21 11:00 PM   Result Value Ref Range    Extra Tube Hold for add-ons.    Manual Differential    Collection Time: 07/10/21 11:00 PM    Specimen: Blood   Result Value Ref Range    Neutrophil % 60.0 42.7 - 76.0 %    Lymphocyte % 17.9 (L) 19.6 - 45.3 %    Monocyte % 13.7 (H) 5.0 - 12.0 %    Eosinophil % 8.4 (H) 0.3 - 6.2 %    Neutrophils Absolute 6.83 1.70 - 7.00 10*3/mm3     Lymphocytes Absolute 2.04 0.70 - 3.10 10*3/mm3    Monocytes Absolute 1.56 (H) 0.10 - 0.90 10*3/mm3    Eosinophils Absolute 0.96 (H) 0.00 - 0.40 10*3/mm3    RBC Morphology Normal Normal    WBC Morphology Normal Normal    Platelet Morphology Normal Normal   Troponin    Collection Time: 07/10/21 11:00 PM    Specimen: Blood   Result Value Ref Range    Troponin T <0.010 0.000 - 0.030 ng/mL   COVID-19,[facility] IN-HOUSE CEPHEID/ELFEGO NP SWAB IN TRANSPORT MEDIA 8-12 HR TAT - Swab, Nasopharynx    Collection Time: 07/10/21 11:05 PM    Specimen: Nasopharynx; Swab   Result Value Ref Range    COVID19 Not Detected Not Detected - Ref. Range   Urinalysis With Microscopic If Indicated (No Culture) - Urine, Clean Catch    Collection Time: 07/10/21 11:35 PM    Specimen: Urine, Clean Catch   Result Value Ref Range    Color, UA Yellow Yellow, Straw    Appearance, UA Clear Clear    pH, UA 6.0 5.0 - 8.0    Specific Gravity, UA 1.011 1.005 - 1.030    Glucose, UA Negative Negative    Ketones, UA Negative Negative    Bilirubin, UA Negative Negative    Blood, UA Trace (A) Negative    Protein, UA Negative Negative    Leuk Esterase, UA Trace (A) Negative    Nitrite, UA Negative Negative    Urobilinogen, UA 0.2 E.U./dL 0.2 - 1.0 E.U./dL   Urinalysis, Microscopic Only - Urine, Clean Catch    Collection Time: 07/10/21 11:35 PM    Specimen: Urine, Clean Catch   Result Value Ref Range    RBC, UA 0-2 None Seen, 0-2 /HPF    WBC, UA 6-12 (A) None Seen, 0-2 /HPF    Bacteria, UA None Seen None Seen /HPF    Squamous Epithelial Cells, UA 0-2 None Seen, 0-2 /HPF    Hyaline Casts, UA None Seen None Seen /LPF    Methodology Automated Microscopy    D-dimer, Quantitative    Collection Time: 07/11/21  1:00 AM    Specimen: Blood   Result Value Ref Range    D-Dimer, Quantitative 0.74 (H) 0.00 - 0.49 MCGFEU/mL   ECG 12 Lead    Collection Time: 07/11/21  3:08 AM   Result Value Ref Range    QT Interval 355 ms       I ordered the above labs and reviewed the  results.    RADIOLOGY  XR Chest 1 View    Result Date: 7/11/2021  SINGLE VIEW OF THE CHEST  HISTORY: Cough  COMPARISON: None available.  FINDINGS: Heart size is within normal limits. Lungs appear clear. No pneumothorax, pleural effusion, or acute infiltrate is seen.      No acute findings.  This report was finalized on 7/11/2021 12:43 AM by Dr. Luda An M.D.      CT Angiogram Chest    Result Date: 7/11/2021  CT ANGIOGRAM OF THE CHEST  HISTORY: Shortness of air. Elevated d-dimer.  COMPARISON: None available.  TECHNIQUE: Axial CT imaging was obtained through the thorax. IV contrast was administered. Three-D reformatted images were obtained.  FINDINGS: Exam is degraded by poor timing of the contrast bolus. No obvious pulmonary thromboembolus is seen. The thoracic aorta is normal in caliber. There is no evidence of dissection. There may be some minimal coronary artery calcifications. The thyroid gland appear within normal limits. There is a small hiatal hernia. There is no pleural or pericardial effusion. Mediastinal lymph nodes do not appear pathologically enlarged. No acute infiltrates are seen. Liver is suspected to be steatotic. There are bilateral renal cysts. No acute abnormalities are noted within the upper abdomen. Gallbladder is surgically absent. No acute osseous abnormalities are seen.      No acute pulmonary thromboembolus identified.  Radiation dose reduction techniques were utilized, including automated exposure control and exposure modulation based on body size.  This report was finalized on 7/11/2021 3:15 AM by Dr. Luda An M.D.        I ordered the above noted radiological studies. I reviewed the images and results. I agree with the radiologist interpretation.    PROCEDURES  Procedures    MEDICATIONS GIVEN IN ER  Medications   sodium chloride 0.9 % flush 10 mL (has no administration in time range)   ondansetron (ZOFRAN) injection 8 mg (8 mg Intravenous Not Given 7/11/21 0304)    acetaminophen (TYLENOL) tablet 1,000 mg (1,000 mg Oral Given 7/10/21 2321)   sodium chloride 0.9 % bolus 1,000 mL (0 mL Intravenous Stopped 7/11/21 0224)   iopamidol (ISOVUE-370) 76 % injection 100 mL (90 mL Intravenous Given 7/11/21 0247)       PROGRESS, DATA ANALYSIS, CONSULTS, AND MEDICAL DECISION MAKING  A complete history and physical exam have been performed.  All available laboratory and imaging results have been reviewed by myself prior to disposition.    MDM  After the initial H&P, I discussed pertinent information from history and physical exam with patient/family.  Discussed differential diagnosis.  Discussed plan for ED evaluation/work-up/treatment.  All questions answered.  Patient/family is agreeable with plan.  ED Course as of Jul 11 0329   Sat Jul 10, 2021   2300 I wore full protective equipment throughout this patient encounter including a N95 facemask, faceshield, gown and gloves. Hand hygiene was performed before donning protective equipment and after removal when leaving the room.        [JG]   Sun Jul 11, 2021   0219 Dimer elevated, obtaining CT angiogram.    [JG]   0317 EKG independently viewed and contemporaneously interpreted by ED physician. Time: 3:08 AM.  Rate 93.  Interpretation: Normal sinus rhythm, normal axis, normal QRS, no acute ST changes.    [JG]   0327 Patient had low-grade temperature, tachycardia, chest x-ray unremarkable.  Dimer was elevated, obtain CT angiogram, no signs of pulmonary embolism, no pulmonary infiltrates on CT imaging.  Lab work otherwise unremarkable other than mild hyperglycemia, no DKA.  Patient is well-appearing.  Patient likely has undifferentiated viral illness.  Covid is negative.  Given extensive discussion return precautions, plan for discharge with primary care follow-up.    [JG]   0327 The patient was reexamined.  They have had symptomatic improvement during their ED stay.  I discussed today's findings with the patient, explaining the pertinent  positives and negatives from today's visit, and the plan of care.  Discussed plan for discharge as there is no emergent indication for admission.  Discussed limitation of the ED work-up and that this is to rule out life-threatening emergencies but that they could require further testing as determined by their primary care and or any referred specialist patient is agreeable and understands need for follow-up and repeat exam/testing.  Patient is aware that discharge does not mean there is nothing wrong, indicates no emergency is present, and that they must continue their care with their primary care physician and/or any referred specialist.  They were given appropriate follow-up with their primary care physician and/or specialist.  I had an extensive discussion on the expected clinical course and return precautions.  Patient understands to return to the emergency department for continuation, worsening, or new symptoms.  I answered any of the patient's questions. Patient was discharged home in a stable condition.        [JG]      ED Course User Index  [JG] Geronimo Joy MD       AS OF 03:29 EDT VITALS:    BP - 141/93  HR - 112  TEMP - 99.2 °F (37.3 °C)  O2 SATS - 98%    DIAGNOSIS  Final diagnoses:   Upper respiratory tract infection, unspecified type   Positive D dimer   Hyperglycemia         DISPOSITION  DISCHARGE    Patient discharged in stable condition.    Reviewed implications of results, diagnosis, meds, responsibility to follow up, warning signs and symptoms of possible worsening, potential complications and reasons to return to ER.    Patient/Family voiced understanding of above instructions.    Discussed plan for discharge, as there is no emergent indication for admission. Patient referred to primary care provider for BP management due to today's BP. Pt/family is agreeable and understands need for follow up and repeat testing.  Pt is aware that discharge does not mean that nothing is wrong but it  indicates no emergency is present that requires admission and they must continue care with follow-up as given below or physician of their choice.     FOLLOW-UP  Geovanna Kinney MD  [address]  [address]  [facility] [address] [phone]    Schedule an appointment as soon as possible for a visit in 2 days  even if well         Medication List      New Prescriptions    benzonatate 200 MG capsule  Commonly known as: TESSALON  Take 1 capsule by mouth 3 (Three) Times a Day As Needed for Cough.     Sudafed PE Head Congestion 5--325 MG tablet  Generic drug: Phenylephrine-DM-GG-APAP  Take 2 tablets by mouth Every 8 (Eight) Hours As Needed (cough/congestion).        Changed    azelastine 0.15 % solution nasal spray  Commonly known as: ASTEPRO  1 spray into the nostril(s) as directed by provider Daily.  What changed:   · when to take this  · reasons to take this     fluticasone 50 MCG/ACT nasal spray  Commonly known as: FLONASE  2 sprays into the nostril(s) as directed by provider Daily.  What changed:   · how much to take  · how to take this           Where to Get Your Medications      These medications were sent to [id number] - [place], [place] - [address] [facility] [address] [facility] [phone] [facility] [phone]   [address], [facility] [address]    Phone: [phone]   · benzonatate 200 MG capsule  · Sudafed PE Head Congestion 5--325 MG tablet          Geronimo Joy MD  07/11/21 7090

## 2021-07-11 NOTE — ED NOTES
Pt ambulatory c steady gait, AAOx4, ABC's intact, NAD noted at this time. Pt discharged c belongings and educational packet. PPE worn by this RN c pt wearing mask during encounters.      Brian Hughes, RN  07/11/21 5939

## 2021-07-15 LAB — BACTERIA SPEC AEROBE CULT: NORMAL

## 2021-07-16 ENCOUNTER — OFFICE VISIT (OUTPATIENT)
Dept: INTERNAL MEDICINE | Facility: CLINIC | Age: 51
End: 2021-07-16

## 2021-07-16 VITALS
OXYGEN SATURATION: 98 % | DIASTOLIC BLOOD PRESSURE: 74 MMHG | HEART RATE: 94 BPM | WEIGHT: 165.5 LBS | TEMPERATURE: 97.8 F | HEIGHT: 62 IN | BODY MASS INDEX: 30.46 KG/M2 | SYSTOLIC BLOOD PRESSURE: 100 MMHG

## 2021-07-16 DIAGNOSIS — J01.00 SUBACUTE MAXILLARY SINUSITIS: Primary | ICD-10-CM

## 2021-07-16 LAB — BACTERIA SPEC AEROBE CULT: NORMAL

## 2021-07-16 PROCEDURE — 99213 OFFICE O/P EST LOW 20 MIN: CPT | Performed by: NURSE PRACTITIONER

## 2021-07-16 RX ORDER — AMOXICILLIN 875 MG/1
875 TABLET, COATED ORAL 2 TIMES DAILY
Qty: 20 TABLET | Refills: 0 | Status: SHIPPED | OUTPATIENT
Start: 2021-07-16 | End: 2021-10-15

## 2021-07-16 NOTE — PROGRESS NOTES
Subjective   Anu Adam is a 51 y.o. female. Patient is here today for   Chief Complaint   Patient presents with   • URI     Pt c/o cough, congestion HA, earache, green drainage X 9 days.   .    History of Present Illness   New problem to this provider: C/o nasal congestion x 9 days associated with post-nasal drainage, cough. Some wheezing and shortness of breath. No history of asthma  She was seen in the ER 6 days ago and had a negative work-up.  Her D-dimer was slightly elevated, but she had a negative chest CT.  No pulmonary embolus, infiltrates.  She felt like she was getting better, but them symptoms worsened.  She has taken tessalon, Mucinex DM with minimal relief.    The following portions of the patient's history were reviewed and updated as appropriate: allergies, current medications, past family history, past medical history, past social history, past surgical history and problem list.    Review of Systems    Objective   Vitals:    07/16/21 1101   BP: 100/74   Pulse: 94   Temp: 97.8 °F (36.6 °C)   SpO2: 98%     Body mass index is 30.26 kg/m².  Physical Exam  Vitals and nursing note reviewed.   Constitutional:       Appearance: Normal appearance. She is well-developed.   HENT:      Right Ear: Ear canal normal. A middle ear effusion is present.      Left Ear: Ear canal normal. A middle ear effusion is present.      Mouth/Throat:      Mouth: Mucous membranes are moist.      Pharynx: Oropharynx is clear.   Cardiovascular:      Rate and Rhythm: Normal rate and regular rhythm.      Heart sounds: Normal heart sounds.   Pulmonary:      Effort: Pulmonary effort is normal.      Breath sounds: Normal breath sounds.   Lymphadenopathy:      Cervical: No cervical adenopathy.   Skin:     General: Skin is warm and dry.   Neurological:      Mental Status: She is alert and oriented to person, place, and time.   Psychiatric:         Speech: Speech normal.         Behavior: Behavior normal.         Thought Content: Thought  content normal.         Assessment/Plan   Diagnoses and all orders for this visit:    1. Subacute maxillary sinusitis (Primary)  -     amoxicillin (AMOXIL) 875 MG tablet; Take 1 tablet by mouth 2 (Two) Times a Day.  Dispense: 20 tablet; Refill: 0    Patient can continue with Mucinex DM as needed. Increase fluids and rest

## 2021-08-02 RX ORDER — MEDROXYPROGESTERONE ACETATE 2.5 MG/1
TABLET ORAL
Qty: 90 TABLET | Refills: 1 | Status: SHIPPED | OUTPATIENT
Start: 2021-08-02 | End: 2022-08-01

## 2021-08-09 ENCOUNTER — OFFICE VISIT (OUTPATIENT)
Dept: NEUROSURGERY | Facility: CLINIC | Age: 51
End: 2021-08-09

## 2021-08-09 VITALS
BODY MASS INDEX: 30.84 KG/M2 | DIASTOLIC BLOOD PRESSURE: 68 MMHG | TEMPERATURE: 98.2 F | WEIGHT: 167.6 LBS | HEIGHT: 62 IN | SYSTOLIC BLOOD PRESSURE: 128 MMHG

## 2021-08-09 DIAGNOSIS — Z98.890 HISTORY OF SPINAL SURGERY: ICD-10-CM

## 2021-08-09 DIAGNOSIS — M51.36 DDD (DEGENERATIVE DISC DISEASE), LUMBAR: Primary | ICD-10-CM

## 2021-08-09 PROCEDURE — 99213 OFFICE O/P EST LOW 20 MIN: CPT | Performed by: NEUROLOGICAL SURGERY

## 2021-08-09 NOTE — PROGRESS NOTES
"Subjective   Patient ID: Anu Adam is a 51 y.o. female is here today for follow-up on lumbar disc disease. We refilled her Gabapentin 300 mg at her last visit.    6/24/2021 Lumbar MRI W/WO- [facility].    Today patient reports that she is in no pain and feels great.    Its been 18 months since this patient underwent a right L3-L4 microdiscectomy.  Is taken 18 months but her right leg pain is finally settled down.  She is on gabapentin at 300 mg twice daily which I have given to her.  She had a flareup in June and her primary care physician ordered an MRI which I reviewed with her.  It was done on 5/3/2021 but there really is not any evidence of a recurrent disc extrusion.  There is some scar tissue and a little residual protrusion but nothing dramatic.  She is actually much better.  She is working and doing exercises.  She wants to try and wean off the gabapentin which I think is reasonable.  I told her to take 300 mg nightly x2 weeks and then every other night x2 weeks and then stop it.  I asked her to give us a call afterwards and let us know how she is doing.  If she is doing fine then the medicine can be stopped altogether.  If she has residual symptoms she should go back on the gabapentin but then we will need to decide if she wants me to prescribe it or her primary care physician.  We can determine that later if the symptoms recur.          History of Present Illness    The following portions of the patient's history were reviewed and updated as appropriate: allergies, current medications, past family history, past medical history, past social history, past surgical history and problem list.    Review of Systems   Constitutional: Negative for fever.   All other systems reviewed and are negative.          Objective     Vitals:    08/09/21 1551   BP: 128/68   Temp: 98.2 °F (36.8 °C)   Weight: 76 kg (167 lb 9.6 oz)   Height: 157.5 cm (62.01\")     Body mass index is 30.64 kg/m².      Physical " Exam  Constitutional:       Appearance: She is well-developed.   HENT:      Head: Normocephalic and atraumatic.   Eyes:      Extraocular Movements: EOM normal.      Conjunctiva/sclera: Conjunctivae normal.      Pupils: Pupils are equal, round, and reactive to light.   Neck:      Vascular: No carotid bruit.   Neurological:      Mental Status: She is oriented to person, place, and time.      Coordination: Finger-Nose-Finger Test and Heel to Shin Test normal.      Gait: Gait is intact.      Deep Tendon Reflexes:      Reflex Scores:       Tricep reflexes are 2+ on the right side and 2+ on the left side.       Bicep reflexes are 2+ on the right side and 2+ on the left side.       Brachioradialis reflexes are 2+ on the right side and 2+ on the left side.       Patellar reflexes are 2+ on the right side and 2+ on the left side.       Achilles reflexes are 2+ on the right side and 2+ on the left side.  Psychiatric:         Speech: Speech normal.       Neurologic Exam     Mental Status   Oriented to person, place, and time.   Registration of memory: Good recent and remote memory.   Attention: normal. Concentration: normal.   Speech: speech is normal   Level of consciousness: alert  Knowledge: consistent with education.     Cranial Nerves     CN II   Visual fields full to confrontation.   Visual acuity: normal    CN III, IV, VI   Pupils are equal, round, and reactive to light.  Extraocular motions are normal.     CN V   Facial sensation intact.   Right corneal reflex: normal  Left corneal reflex: normal    CN VII   Facial expression full, symmetric.   Right facial weakness: none  Left facial weakness: none    CN VIII   Hearing: intact    CN IX, X   Palate: symmetric    CN XI   Right sternocleidomastoid strength: normal  Left sternocleidomastoid strength: normal    CN XII   Tongue: not atrophic  Tongue deviation: none    Motor Exam   Muscle bulk: normal  Right arm tone: normal  Left arm tone: normal  Right leg tone:  normal  Left leg tone: normal    Strength   Strength 5/5 except as noted.     Sensory Exam   Light touch normal.     Gait, Coordination, and Reflexes     Gait  Gait: normal    Coordination   Finger to nose coordination: normal  Heel to shin coordination: normal    Reflexes   Right brachioradialis: 2+  Left brachioradialis: 2+  Right biceps: 2+  Left biceps: 2+  Right triceps: 2+  Left triceps: 2+  Right patellar: 2+  Left patellar: 2+  Right achilles: 2+  Left achilles: 2+  Right : 2+  Left : 2+          Assessment/Plan   Independent Review of Radiographic Studies:      I personally reviewed the images from the following studies.    I reviewed the lumbar MRI done on 6/22/2021 which shows postoperative changes on the right L3-L4 level.  There is a mild residual protrusion at that level but I do not think it is compressing any nerve roots.  The other levels are unremarkable.  Agree with the report.        Medical Decision Making:      She is doing overall very well.  We will keep it open-ended for now.  She will attempt to wean the gabapentin by taking 300 mg nightly x2 weeks and then 300 mg every other night for 2 weeks and then stop.  If she has recurrent symptoms she will let us know and gabapentin can be reinstituted.  He can be written for by either me or her primary care physician.  We will decide at that time.  But if it is by me then she will have to follow-up with me at some point.      Diagnoses and all orders for this visit:    1. DDD (degenerative disc disease), lumbar (Primary)    2. History of spinal surgery      Return if symptoms worsen or fail to improve.

## 2021-09-20 DIAGNOSIS — E11.9 CONTROLLED TYPE 2 DIABETES MELLITUS WITHOUT COMPLICATION, WITHOUT LONG-TERM CURRENT USE OF INSULIN (HCC): ICD-10-CM

## 2021-09-20 DIAGNOSIS — E78.00 HYPERCHOLESTEROLEMIA: ICD-10-CM

## 2021-09-26 LAB
ALBUMIN SERPL-MCNC: 4.7 G/DL (ref 3.8–4.9)
ALBUMIN/GLOB SERPL: 2.4 {RATIO} (ref 1.2–2.2)
ALP SERPL-CCNC: 102 IU/L (ref 44–121)
ALT SERPL-CCNC: 37 IU/L (ref 0–32)
AST SERPL-CCNC: 32 IU/L (ref 0–40)
BILIRUB SERPL-MCNC: 1 MG/DL (ref 0–1.2)
BUN SERPL-MCNC: 13 MG/DL (ref 6–24)
BUN/CREAT SERPL: 18 (ref 9–23)
CALCIUM SERPL-MCNC: 9.8 MG/DL (ref 8.7–10.2)
CHLORIDE SERPL-SCNC: 104 MMOL/L (ref 96–106)
CHOLEST SERPL-MCNC: 133 MG/DL (ref 100–199)
CO2 SERPL-SCNC: 24 MMOL/L (ref 20–29)
CREAT SERPL-MCNC: 0.73 MG/DL (ref 0.57–1)
GLOBULIN SER CALC-MCNC: 2 G/DL (ref 1.5–4.5)
GLUCOSE SERPL-MCNC: 128 MG/DL (ref 65–99)
HBA1C MFR BLD: 6.4 % (ref 4.8–5.6)
HDLC SERPL-MCNC: 34 MG/DL
LDLC SERPL CALC-MCNC: 70 MG/DL (ref 0–99)
LDLC/HDLC SERPL: 2.1 RATIO (ref 0–3.2)
POTASSIUM SERPL-SCNC: 4.1 MMOL/L (ref 3.5–5.2)
PROT SERPL-MCNC: 6.7 G/DL (ref 6–8.5)
SODIUM SERPL-SCNC: 143 MMOL/L (ref 134–144)
TRIGL SERPL-MCNC: 167 MG/DL (ref 0–149)
VLDLC SERPL CALC-MCNC: 29 MG/DL (ref 5–40)

## 2021-10-14 DIAGNOSIS — R73.9 HYPERGLYCEMIA: ICD-10-CM

## 2021-10-14 DIAGNOSIS — E11.9 CONTROLLED TYPE 2 DIABETES MELLITUS WITHOUT COMPLICATION, WITHOUT LONG-TERM CURRENT USE OF INSULIN (HCC): Primary | ICD-10-CM

## 2021-10-15 ENCOUNTER — OFFICE VISIT (OUTPATIENT)
Dept: INTERNAL MEDICINE | Facility: CLINIC | Age: 51
End: 2021-10-15

## 2021-10-15 VITALS
TEMPERATURE: 98.7 F | WEIGHT: 164 LBS | HEART RATE: 67 BPM | SYSTOLIC BLOOD PRESSURE: 124 MMHG | HEIGHT: 62 IN | BODY MASS INDEX: 30.18 KG/M2 | DIASTOLIC BLOOD PRESSURE: 62 MMHG | OXYGEN SATURATION: 98 %

## 2021-10-15 DIAGNOSIS — M26.622 TMJ TENDERNESS, LEFT: Primary | ICD-10-CM

## 2021-10-15 DIAGNOSIS — J30.89 CHRONIC NON-SEASONAL ALLERGIC RHINITIS: ICD-10-CM

## 2021-10-15 PROCEDURE — 99213 OFFICE O/P EST LOW 20 MIN: CPT | Performed by: NURSE PRACTITIONER

## 2021-10-19 RX ORDER — ROSUVASTATIN CALCIUM 10 MG/1
10 TABLET, COATED ORAL EVERY EVENING
Qty: 90 TABLET | Refills: 0 | Status: SHIPPED | OUTPATIENT
Start: 2021-10-19 | End: 2022-01-17

## 2021-10-29 ENCOUNTER — OFFICE VISIT (OUTPATIENT)
Dept: INTERNAL MEDICINE | Facility: CLINIC | Age: 51
End: 2021-10-29

## 2021-10-29 VITALS
OXYGEN SATURATION: 97 % | DIASTOLIC BLOOD PRESSURE: 80 MMHG | SYSTOLIC BLOOD PRESSURE: 120 MMHG | WEIGHT: 165 LBS | HEIGHT: 62 IN | BODY MASS INDEX: 30.36 KG/M2 | HEART RATE: 80 BPM | TEMPERATURE: 97.8 F

## 2021-10-29 DIAGNOSIS — E11.65 TYPE 2 DIABETES MELLITUS WITH HYPERGLYCEMIA, WITHOUT LONG-TERM CURRENT USE OF INSULIN (HCC): Primary | ICD-10-CM

## 2021-10-29 DIAGNOSIS — E78.00 HYPERCHOLESTEROLEMIA: ICD-10-CM

## 2021-10-29 DIAGNOSIS — Z23 NEED FOR INFLUENZA VACCINATION: ICD-10-CM

## 2021-10-29 DIAGNOSIS — M51.36 DDD (DEGENERATIVE DISC DISEASE), LUMBAR: ICD-10-CM

## 2021-10-29 PROCEDURE — 90471 IMMUNIZATION ADMIN: CPT | Performed by: FAMILY MEDICINE

## 2021-10-29 PROCEDURE — 90686 IIV4 VACC NO PRSV 0.5 ML IM: CPT | Performed by: FAMILY MEDICINE

## 2021-10-29 PROCEDURE — 99214 OFFICE O/P EST MOD 30 MIN: CPT | Performed by: FAMILY MEDICINE

## 2021-10-29 NOTE — PROGRESS NOTES
Subjective   Anu Adam is a 51 y.o. female.   Chief Complaint   Patient presents with   • Back Pain   • Diabetes   • Hyperlipidemia       History of Present Illness       #1  Diabetes type 2-diagnosed in 8/2020.  Patient did not do diabetes education yet.   She is scheduled in November.   She does not check blood sugars at home.  She does not exercise regularly.  She is on Metformin extended release at 500 mg a day.  She takes it every day. She has no side effects.  No diarrhea.  She had diarrhea on Metformin  immediately release.       , A1c at  6.4 from 6.7.  She had eye exam in September 2020.  She had Pneumovax in August 2020.     2.  Hyperlipidemia-patient is on Crestor at 10 mg a day.  We increased dose at last office visit.  She takes it every day.  She has no side effects.  No muscle aches. LDL 70 from 79, HDL 34.  LFTs stable.     3.  Lumbar degenerative disc disease-patient had microdiscectomy at L3-L4 in February 2020 by Dr. Mcgraw.  She was started on gabapentin at 300 mg twice a day after surgery.  It controled her symptoms.  When she saw Dr. Mcgraw last time, they advised her to try weaning off.  She tried to do it as advised, but after 1 to 2 weeks of decreasing dose from twice a day to once a day she started having right leg pain.  It was the same as prior to initiation of therapy.  So she increased it back to 300 mg twice a day as advised by Dr. Ramos.  All symptoms resolved after she did it.  She has no back pain, no leg pain.  She has no side effects from gabapentin.  She would like us to take over prescribing gabapentin for her.  It was suggested by Dr. Mcgraw.  She does not need refills at this time yet.    The following portions of the patient's history were reviewed and updated as appropriate: allergies, current medications, past family history, past medical history, past social history, past surgical history and problem list.    Review of Systems   Constitutional:  Negative.    Respiratory: Negative.    Cardiovascular: Negative.          Objective   Wt Readings from Last 3 Encounters:   10/29/21 74.8 kg (165 lb)   10/15/21 74.4 kg (164 lb)   08/09/21 76 kg (167 lb 9.6 oz)      Vitals:    10/29/21 0734   BP: 120/80   Pulse: 80   Temp: 97.8 °F (36.6 °C)   SpO2: 97%     Temp Readings from Last 3 Encounters:   10/29/21 97.8 °F (36.6 °C)   10/15/21 98.7 °F (37.1 °C)   08/09/21 98.2 °F (36.8 °C)     BP Readings from Last 3 Encounters:   10/29/21 120/80   10/15/21 124/62   08/09/21 128/68     Pulse Readings from Last 3 Encounters:   10/29/21 80   10/15/21 67   07/16/21 94     Body mass index is 30.17 kg/m².    Physical Exam  Constitutional:       Appearance: She is well-developed. She is obese.   HENT:      Head: Normocephalic and atraumatic.   Neck:      Thyroid: No thyromegaly.      Vascular: No carotid bruit.   Cardiovascular:      Rate and Rhythm: Normal rate and regular rhythm.      Heart sounds: Normal heart sounds.   Pulmonary:      Effort: Pulmonary effort is normal.      Breath sounds: Normal breath sounds.   Musculoskeletal:      Cervical back: Neck supple.   Skin:     General: Skin is warm and dry.   Neurological:      Mental Status: She is alert and oriented to person, place, and time.   Psychiatric:         Behavior: Behavior normal.         Assessment/Plan   Diagnoses and all orders for this visit:    1. Type 2 diabetes mellitus with hyperglycemia, without long-term current use of insulin (HCC) (Primary)  -     Hemoglobin A1c; Future  -     MicroAlbumin, Urine, Random - Urine, Clean Catch; Future  -     Basic Metabolic Panel; Future    2. Hypercholesterolemia  -     Lipid Panel With LDL / HDL Ratio; Future  -     Basic Metabolic Panel; Future    3. DDD (degenerative disc disease), lumbar        #1 diabetes type 2-we will continue current treatment.  Patient is advised to improve her diet.  To start to exercise.  She can start with 30 minutes a day, 3 days a week.  She  is due for eye exam.  Labs in 3 months before office visit.  2.  Hyperlipidemia-LDL at 70, no significant change after increase in dose of Crestor.  Will recheck in 3 to 6 months and if it is still above 70 will increase dose.  Follow-up in 3 months.  3.  Degenerative disc disease-notes from Dr. Mcgraw were reviewed.  We are going to follow-up with patient on gabapentin.  She will see me in 3 months.

## 2021-12-03 RX ORDER — GABAPENTIN 300 MG/1
300 CAPSULE ORAL 2 TIMES DAILY
Qty: 180 CAPSULE | Refills: 0 | Status: SHIPPED | OUTPATIENT
Start: 2021-12-03 | End: 2022-02-11 | Stop reason: SDUPTHER

## 2021-12-03 RX ORDER — GABAPENTIN 300 MG/1
300 CAPSULE ORAL 2 TIMES DAILY
Qty: 180 CAPSULE | Refills: 0 | Status: SHIPPED | OUTPATIENT
Start: 2021-12-03 | End: 2021-12-03 | Stop reason: SDUPTHER

## 2021-12-15 ENCOUNTER — PROCEDURE VISIT (OUTPATIENT)
Dept: OBSTETRICS AND GYNECOLOGY | Age: 51
End: 2021-12-15

## 2021-12-15 ENCOUNTER — APPOINTMENT (OUTPATIENT)
Dept: WOMENS IMAGING | Facility: HOSPITAL | Age: 51
End: 2021-12-15

## 2021-12-15 DIAGNOSIS — Z12.31 VISIT FOR SCREENING MAMMOGRAM: Primary | ICD-10-CM

## 2021-12-15 PROCEDURE — 77063 BREAST TOMOSYNTHESIS BI: CPT | Performed by: RADIOLOGY

## 2021-12-15 PROCEDURE — 77067 SCR MAMMO BI INCL CAD: CPT | Performed by: RADIOLOGY

## 2021-12-15 PROCEDURE — 77063 BREAST TOMOSYNTHESIS BI: CPT | Performed by: OBSTETRICS & GYNECOLOGY

## 2021-12-15 PROCEDURE — 77067 SCR MAMMO BI INCL CAD: CPT | Performed by: OBSTETRICS & GYNECOLOGY

## 2021-12-23 DIAGNOSIS — N64.89 BREAST ASYMMETRY: Primary | ICD-10-CM

## 2022-01-03 RX ORDER — TIZANIDINE 2 MG/1
TABLET ORAL
Qty: 90 TABLET | Refills: 3 | Status: SHIPPED | OUTPATIENT
Start: 2022-01-03

## 2022-01-03 NOTE — TELEPHONE ENCOUNTER
Rx Refill Note  Requested Prescriptions     Pending Prescriptions Disp Refills   • tiZANidine (ZANAFLEX) 2 MG tablet [Pharmacy Med Name: TIZANIDINE 2MG TABLETS] 90 tablet 3     Sig: TAKE 1 TABLET BY MOUTH EVERY 8 HOURS AS NEEDED FOR MUSCLE SPASMS      Last office visit with prescribing clinician: 8/9/2021      Next office visit with prescribing clinician: Visit date not found            Leatha Rios MA  01/03/22, 07:59 EST

## 2022-01-17 RX ORDER — METFORMIN HYDROCHLORIDE 500 MG/1
500 TABLET, EXTENDED RELEASE ORAL
Qty: 90 TABLET | Refills: 0 | Status: SHIPPED | OUTPATIENT
Start: 2022-01-17 | End: 2022-02-11 | Stop reason: SDUPTHER

## 2022-01-17 RX ORDER — ROSUVASTATIN CALCIUM 10 MG/1
10 TABLET, COATED ORAL EVERY EVENING
Qty: 90 TABLET | Refills: 0 | Status: SHIPPED | OUTPATIENT
Start: 2022-01-17 | End: 2022-02-11 | Stop reason: SDUPTHER

## 2022-02-11 ENCOUNTER — OFFICE VISIT (OUTPATIENT)
Dept: INTERNAL MEDICINE | Facility: CLINIC | Age: 52
End: 2022-02-11

## 2022-02-11 VITALS
TEMPERATURE: 98 F | WEIGHT: 164 LBS | OXYGEN SATURATION: 97 % | DIASTOLIC BLOOD PRESSURE: 70 MMHG | HEIGHT: 62 IN | SYSTOLIC BLOOD PRESSURE: 100 MMHG | BODY MASS INDEX: 30.18 KG/M2 | HEART RATE: 87 BPM

## 2022-02-11 DIAGNOSIS — F41.8 MIXED ANXIETY DEPRESSIVE DISORDER: ICD-10-CM

## 2022-02-11 DIAGNOSIS — E03.9 ACQUIRED HYPOTHYROIDISM: ICD-10-CM

## 2022-02-11 DIAGNOSIS — R80.9 CONTROLLED TYPE 2 DIABETES MELLITUS WITH MICROALBUMINURIA, WITHOUT LONG-TERM CURRENT USE OF INSULIN: Primary | ICD-10-CM

## 2022-02-11 DIAGNOSIS — E11.29 CONTROLLED TYPE 2 DIABETES MELLITUS WITH MICROALBUMINURIA, WITHOUT LONG-TERM CURRENT USE OF INSULIN: Primary | ICD-10-CM

## 2022-02-11 DIAGNOSIS — E78.00 HYPERCHOLESTEROLEMIA: ICD-10-CM

## 2022-02-11 DIAGNOSIS — M51.36 DDD (DEGENERATIVE DISC DISEASE), LUMBAR: ICD-10-CM

## 2022-02-11 PROCEDURE — 99214 OFFICE O/P EST MOD 30 MIN: CPT | Performed by: FAMILY MEDICINE

## 2022-02-11 RX ORDER — METFORMIN HYDROCHLORIDE 500 MG/1
500 TABLET, EXTENDED RELEASE ORAL
Qty: 90 TABLET | Refills: 1 | Status: SHIPPED | OUTPATIENT
Start: 2022-02-11 | End: 2022-05-11

## 2022-02-11 RX ORDER — LISINOPRIL 2.5 MG/1
2.5 TABLET ORAL DAILY
Qty: 90 TABLET | Refills: 1 | Status: SHIPPED | OUTPATIENT
Start: 2022-02-11 | End: 2022-06-28 | Stop reason: SDUPTHER

## 2022-02-11 RX ORDER — ROSUVASTATIN CALCIUM 10 MG/1
10 TABLET, COATED ORAL EVERY EVENING
Qty: 90 TABLET | Refills: 1 | Status: SHIPPED | OUTPATIENT
Start: 2022-02-11 | End: 2022-06-28 | Stop reason: SDUPTHER

## 2022-02-11 RX ORDER — SERTRALINE HYDROCHLORIDE 100 MG/1
100 TABLET, FILM COATED ORAL DAILY
Qty: 90 TABLET | Refills: 1 | Status: SHIPPED | OUTPATIENT
Start: 2022-02-11 | End: 2022-07-29

## 2022-02-11 RX ORDER — GABAPENTIN 300 MG/1
300 CAPSULE ORAL 2 TIMES DAILY
Qty: 180 CAPSULE | Refills: 0 | Status: SHIPPED | OUTPATIENT
Start: 2022-02-11 | End: 2022-06-28 | Stop reason: SDUPTHER

## 2022-02-11 NOTE — PROGRESS NOTES
Subjective   Anu Adam is a 52 y.o. female.   Chief Complaint   Patient presents with   • Hyperlipidemia   • Diabetes   • Depression   • Degenerative disc disease       History of Present Illness     1.  Diabetes type 2-diagnosed in 8/2020.  Patient did not do diabetes education yet. She does not exercise regularly.  She is on Metformin extended release at 500 mg a day.  She takes it every day. She has no side effects.  No diarrhea.  She had diarrhea on Metformin  immediately release.      Upper respiratory viral infection few weeks ago.  She was tested for COVID and flu and it was negative.     , A1c at   6.6 from 6.4 from 6.7.  Microalbumin 63.5  She had eye exam in September 2020.  No eye exam scheduled yet.  She had Pneumovax in August 2020.     2.  Hyperlipidemia-patient is on Crestor at 10 mg a day.  She takes it every day.  She has no side effects. LDL 119 from 70 from 79, HDL 39.       3.  Lumbar degenerative disc disease-patient had microdiscectomy at L3-L4 in February 2020 by Dr. Mcgraw.  She was started on gabapentin at 300 mg twice a day after surgery.  It controled her symptoms.  When she saw Dr. Mcgraw last time, they advised her to try weaning off.  She tried to do it as advised, but after 1 to 2 weeks of decreasing dose from twice a day to once a day she started having right leg pain.  It was the same as prior to initiation of therapy.  So she increased it back to 300 mg twice a day as advised by Dr. Ramos.  All symptoms resolved after she did it.  She has no back pain, no leg pain.  She has no side effects from gabapentin.    It was suggested by Dr. Mcgraw that will take over prescribing it.   She reports that symptoms are controlled.  She takes it as prescribed.  No side effects.    4. Depression with anxiety- on sertraline at 100 mg a day.  She takes it every day.  She has no side effects.  No suicidal ideations, no aggressive behaviors. Mood is normal most of the time.   No excessive worries. PHQ-9 is at 4, GAY-7 at 2.    The following portions of the patient's history were reviewed and updated as appropriate: allergies, current medications, past family history, past medical history, past social history, past surgical history and problem list.    Review of Systems   Respiratory: Negative for shortness of breath.    Cardiovascular: Negative for chest pain.   Neurological: Negative for light-headedness.   Psychiatric/Behavioral: Negative for suicidal ideas.         Objective   Wt Readings from Last 3 Encounters:   02/11/22 74.4 kg (164 lb)   10/29/21 74.8 kg (165 lb)   10/15/21 74.4 kg (164 lb)      Vitals:    02/11/22 0725   BP: 100/70   Pulse: 87   Temp: 98 °F (36.7 °C)   SpO2: 97%     Temp Readings from Last 3 Encounters:   02/11/22 98 °F (36.7 °C)   10/29/21 97.8 °F (36.6 °C)   10/15/21 98.7 °F (37.1 °C)     BP Readings from Last 3 Encounters:   02/11/22 100/70   10/29/21 120/80   10/15/21 124/62     Pulse Readings from Last 3 Encounters:   02/11/22 87   10/29/21 80   10/15/21 67     Body mass index is 29.99 kg/m².    Physical Exam  Constitutional:       Appearance: She is well-developed. She is obese.   HENT:      Head: Normocephalic and atraumatic.   Neck:      Thyroid: No thyromegaly.      Vascular: No carotid bruit.   Cardiovascular:      Rate and Rhythm: Normal rate and regular rhythm.      Pulses: Normal pulses.      Heart sounds: Normal heart sounds.   Pulmonary:      Effort: Pulmonary effort is normal.      Breath sounds: Normal breath sounds.   Musculoskeletal:      Cervical back: Neck supple.   Feet:      Comments: See diabetic foot form which will be scanned.  Skin:     General: Skin is warm and dry.   Neurological:      Mental Status: She is alert and oriented to person, place, and time.   Psychiatric:         Behavior: Behavior normal.         Assessment/Plan   Diagnoses and all orders for this visit:    1. Controlled type 2 diabetes mellitus with microalbuminuria,  without long-term current use of insulin (HCC) (Primary)  -     Comprehensive Metabolic Panel; Future  -     Hemoglobin A1c; Future  -     MicroAlbumin, Urine, Random - Urine, Clean Catch; Future    2. Hypercholesterolemia  -     Lipid Panel With LDL / HDL Ratio; Future    3. DDD (degenerative disc disease), lumbar    4. Mixed anxiety depressive disorder    5. Acquired hypothyroidism  -     Thyroid Cascade Profile; Future    Other orders  -     lisinopril (Zestril) 2.5 MG tablet; Take 1 tablet by mouth Daily.  Dispense: 90 tablet; Refill: 1  -     sertraline (ZOLOFT) 100 MG tablet; Take 1 tablet by mouth Daily.  Dispense: 90 tablet; Refill: 1  -     rosuvastatin (CRESTOR) 10 MG tablet; Take 1 tablet by mouth Every Evening.  Dispense: 90 tablet; Refill: 1  -     metFORMIN ER (GLUCOPHAGE-XR) 500 MG 24 hr tablet; Take 1 tablet by mouth Daily With Breakfast.  Dispense: 90 tablet; Refill: 1  -     gabapentin (NEURONTIN) 300 MG capsule; Take 1 capsule by mouth 2 (Two) Times a Day.  Dispense: 180 capsule; Refill: 0        1.  DMtype II-continue current treatment.  Decrease carbs and sweets.  We are starting lisinopril 2.5 mg a day for microalbuminuria.  She is reminded to schedule eye exam.  Follow-up in 3 months  2.  Hyperlipidemia-LDL above 70, patient reports that her diet was not good when she had upper respiratory infection.  We will try another 3 months of dietary changes.  We will continue current dose of rosuvastatin.  Follow-up in 3 months.  3.  Depression with anxiety-continue same.  Follow-up in 6 months.  4.  DDD-controlled.  Continue current treatment.  Follow-up in 3 months.

## 2022-02-23 ENCOUNTER — APPOINTMENT (OUTPATIENT)
Dept: WOMENS IMAGING | Facility: HOSPITAL | Age: 52
End: 2022-02-23

## 2022-02-23 PROCEDURE — 76641 ULTRASOUND BREAST COMPLETE: CPT | Performed by: RADIOLOGY

## 2022-02-23 PROCEDURE — 77065 DX MAMMO INCL CAD UNI: CPT | Performed by: RADIOLOGY

## 2022-02-23 PROCEDURE — 77061 BREAST TOMOSYNTHESIS UNI: CPT | Performed by: RADIOLOGY

## 2022-02-23 PROCEDURE — G0279 TOMOSYNTHESIS, MAMMO: HCPCS | Performed by: RADIOLOGY

## 2022-02-24 ENCOUNTER — TELEPHONE (OUTPATIENT)
Dept: OBSTETRICS AND GYNECOLOGY | Age: 52
End: 2022-02-24

## 2022-02-24 NOTE — TELEPHONE ENCOUNTER
[facility] called reporting a BI-RAD 4.   Recommending left cyst aspiration with possible US guided bx if cyst is not aspirated.     Pt not aware; report being faxed now.

## 2022-02-25 ENCOUNTER — TELEPHONE (OUTPATIENT)
Dept: OBSTETRICS AND GYNECOLOGY | Age: 52
End: 2022-02-25

## 2022-02-25 DIAGNOSIS — N60.02 BREAST CYST, LEFT: Primary | ICD-10-CM

## 2022-03-23 ENCOUNTER — APPOINTMENT (OUTPATIENT)
Dept: WOMENS IMAGING | Facility: HOSPITAL | Age: 52
End: 2022-03-23

## 2022-03-23 PROCEDURE — 76942 ECHO GUIDE FOR BIOPSY: CPT | Performed by: RADIOLOGY

## 2022-03-23 PROCEDURE — 19000 PUNCTURE ASPIR CYST BREAST: CPT | Performed by: RADIOLOGY

## 2022-04-06 ENCOUNTER — TELEPHONE (OUTPATIENT)
Dept: INTERNAL MEDICINE | Facility: CLINIC | Age: 52
End: 2022-04-06

## 2022-04-11 DIAGNOSIS — R80.9 CONTROLLED TYPE 2 DIABETES MELLITUS WITH MICROALBUMINURIA, WITHOUT LONG-TERM CURRENT USE OF INSULIN: ICD-10-CM

## 2022-04-11 DIAGNOSIS — E03.9 ACQUIRED HYPOTHYROIDISM: ICD-10-CM

## 2022-04-11 DIAGNOSIS — E11.29 CONTROLLED TYPE 2 DIABETES MELLITUS WITH MICROALBUMINURIA, WITHOUT LONG-TERM CURRENT USE OF INSULIN: ICD-10-CM

## 2022-04-11 DIAGNOSIS — E78.00 HYPERCHOLESTEROLEMIA: ICD-10-CM

## 2022-04-12 DIAGNOSIS — N60.02 SOLITARY CYST OF LEFT BREAST: Primary | ICD-10-CM

## 2022-04-13 ENCOUNTER — OFFICE VISIT (OUTPATIENT)
Dept: INTERNAL MEDICINE | Facility: CLINIC | Age: 52
End: 2022-04-13

## 2022-04-13 VITALS
WEIGHT: 166 LBS | OXYGEN SATURATION: 97 % | BODY MASS INDEX: 30.55 KG/M2 | HEART RATE: 76 BPM | DIASTOLIC BLOOD PRESSURE: 70 MMHG | TEMPERATURE: 97.5 F | HEIGHT: 62 IN | SYSTOLIC BLOOD PRESSURE: 110 MMHG

## 2022-04-13 DIAGNOSIS — M25.50 MULTIPLE JOINT PAIN: Primary | ICD-10-CM

## 2022-04-13 PROCEDURE — 99214 OFFICE O/P EST MOD 30 MIN: CPT | Performed by: FAMILY MEDICINE

## 2022-04-13 NOTE — PROGRESS NOTES
Subjective   Anu Adam is a 52 y.o. female.   Chief Complaint   Patient presents with   • MULTIPLE JOINTS PAIN       History of Present Illness     1.  Pain in joints-started November 2021.  She has pain in both shoulders, right more than left, pain in elbows, small joints of hands and feet, ankles and knees.  Pain is constant, dull, sometimes sharp, intensity from 5-10 out of 10.  She tried ice and heat and it did not help.  She tried Tylenol arthritis and it helped some.  She did not notice any redness or swelling.  She complains of a stiffness in all joints especially in the mornings.  It lasts for 3 to 4 hours.  No family history of rheumatoid arthritis.    The following portions of the patient's history were reviewed and updated as appropriate: allergies, current medications, past medical history, past social history and problem list.    Review of Systems   Constitutional: Positive for fatigue.   Musculoskeletal: Positive for arthralgias and myalgias. Negative for joint swelling.         Objective   Wt Readings from Last 3 Encounters:   04/13/22 75.3 kg (166 lb)   02/11/22 74.4 kg (164 lb)   10/29/21 74.8 kg (165 lb)      Vitals:    04/13/22 1404   BP: 110/70   Pulse: 76   Temp: 97.5 °F (36.4 °C)   SpO2: 97%     Temp Readings from Last 3 Encounters:   04/13/22 97.5 °F (36.4 °C)   02/11/22 98 °F (36.7 °C)   10/29/21 97.8 °F (36.6 °C)     BP Readings from Last 3 Encounters:   04/13/22 110/70   02/11/22 100/70   10/29/21 120/80     Pulse Readings from Last 3 Encounters:   04/13/22 76   02/11/22 87   10/29/21 80     Body mass index is 30.35 kg/m².    Physical Exam  Constitutional:       Appearance: She is obese.   Musculoskeletal:      Comments: Tenderness to palpation over both shoulders and elbows, over small joints of the right hand but not left hand.  No tenderness to palpation over knees. Tenderness to palpation over ankles and feet.  No redness, no swelling.   Neurological:      Mental Status: She is  alert.   Psychiatric:         Mood and Affect: Mood normal.         Behavior: Behavior normal.         Assessment/Plan   Diagnoses and all orders for this visit:    1. Multiple joint pain (Primary)  -     CRIS  -     Cyclic Citrul Peptide Antibody, IgG / IgA  -     Sedimentation Rate  -     C-reactive Protein  -     Rheumatoid Factor  -     Ambulatory Referral to Rheumatology        1.  Pain in multiple joints-we are checking labs and referring patient to rheumatologist.  She is advised to use Tylenol 1 to 2 tablets of extra strengthTylenol every 8 hours.

## 2022-04-15 LAB
ANA SER QL: NEGATIVE
CCP IGA+IGG SERPL IA-ACNC: 5 UNITS (ref 0–19)
CRP SERPL-MCNC: 5 MG/L (ref 0–10)
ERYTHROCYTE [SEDIMENTATION RATE] IN BLOOD BY WESTERGREN METHOD: 2 MM/HR (ref 0–40)
RHEUMATOID FACT SERPL-ACNC: <10 IU/ML

## 2022-05-11 RX ORDER — METFORMIN HYDROCHLORIDE 500 MG/1
500 TABLET, EXTENDED RELEASE ORAL
Qty: 30 TABLET | Refills: 0 | Status: SHIPPED | OUTPATIENT
Start: 2022-05-11 | End: 2022-06-28 | Stop reason: SDUPTHER

## 2022-06-19 LAB
ALBUMIN SERPL-MCNC: 4.7 G/DL (ref 3.8–4.9)
ALBUMIN/GLOB SERPL: 2.2 {RATIO} (ref 1.2–2.2)
ALP SERPL-CCNC: 96 IU/L (ref 44–121)
ALT SERPL-CCNC: 28 IU/L (ref 0–32)
AST SERPL-CCNC: 25 IU/L (ref 0–40)
BILIRUB SERPL-MCNC: 0.8 MG/DL (ref 0–1.2)
BUN SERPL-MCNC: 16 MG/DL (ref 6–24)
BUN/CREAT SERPL: 20 (ref 9–23)
CALCIUM SERPL-MCNC: 9.4 MG/DL (ref 8.7–10.2)
CHLORIDE SERPL-SCNC: 104 MMOL/L (ref 96–106)
CHOLEST SERPL-MCNC: 125 MG/DL (ref 100–199)
CO2 SERPL-SCNC: 22 MMOL/L (ref 20–29)
CREAT SERPL-MCNC: 0.79 MG/DL (ref 0.57–1)
EGFRCR SERPLBLD CKD-EPI 2021: 90 ML/MIN/1.73
GLOBULIN SER CALC-MCNC: 2.1 G/DL (ref 1.5–4.5)
GLUCOSE SERPL-MCNC: 121 MG/DL (ref 65–99)
HBA1C MFR BLD: 6.5 % (ref 4.8–5.6)
HDLC SERPL-MCNC: 33 MG/DL
LDLC SERPL CALC-MCNC: 65 MG/DL (ref 0–99)
LDLC/HDLC SERPL: 2 RATIO (ref 0–3.2)
MICROALBUMIN UR-MCNC: 17.2 UG/ML
POTASSIUM SERPL-SCNC: 4.3 MMOL/L (ref 3.5–5.2)
PROT SERPL-MCNC: 6.8 G/DL (ref 6–8.5)
SODIUM SERPL-SCNC: 141 MMOL/L (ref 134–144)
T4 FREE SERPL-MCNC: 2.15 NG/DL (ref 0.82–1.77)
TRIGL SERPL-MCNC: 153 MG/DL (ref 0–149)
TSH SERPL DL<=0.005 MIU/L-ACNC: 0.12 UIU/ML (ref 0.45–4.5)
VLDLC SERPL CALC-MCNC: 27 MG/DL (ref 5–40)

## 2022-06-28 ENCOUNTER — OFFICE VISIT (OUTPATIENT)
Dept: INTERNAL MEDICINE | Facility: CLINIC | Age: 52
End: 2022-06-28

## 2022-06-28 VITALS
HEIGHT: 62 IN | TEMPERATURE: 97.5 F | WEIGHT: 162 LBS | BODY MASS INDEX: 29.81 KG/M2 | OXYGEN SATURATION: 99 % | DIASTOLIC BLOOD PRESSURE: 80 MMHG | SYSTOLIC BLOOD PRESSURE: 112 MMHG | HEART RATE: 72 BPM

## 2022-06-28 DIAGNOSIS — E11.29 CONTROLLED TYPE 2 DIABETES MELLITUS WITH MICROALBUMINURIA, WITHOUT LONG-TERM CURRENT USE OF INSULIN: Primary | ICD-10-CM

## 2022-06-28 DIAGNOSIS — E03.9 ACQUIRED HYPOTHYROIDISM: ICD-10-CM

## 2022-06-28 DIAGNOSIS — F41.8 MIXED ANXIETY DEPRESSIVE DISORDER: ICD-10-CM

## 2022-06-28 DIAGNOSIS — R80.9 CONTROLLED TYPE 2 DIABETES MELLITUS WITH MICROALBUMINURIA, WITHOUT LONG-TERM CURRENT USE OF INSULIN: Primary | ICD-10-CM

## 2022-06-28 DIAGNOSIS — E78.00 HYPERCHOLESTEROLEMIA: ICD-10-CM

## 2022-06-28 DIAGNOSIS — M51.36 DDD (DEGENERATIVE DISC DISEASE), LUMBAR: ICD-10-CM

## 2022-06-28 DIAGNOSIS — J30.9 ALLERGIC RHINITIS, UNSPECIFIED SEASONALITY, UNSPECIFIED TRIGGER: ICD-10-CM

## 2022-06-28 PROCEDURE — 99214 OFFICE O/P EST MOD 30 MIN: CPT | Performed by: FAMILY MEDICINE

## 2022-06-28 RX ORDER — LISINOPRIL 2.5 MG/1
2.5 TABLET ORAL DAILY
Qty: 90 TABLET | Refills: 1 | Status: SHIPPED | OUTPATIENT
Start: 2022-06-28 | End: 2022-07-29

## 2022-06-28 RX ORDER — ROSUVASTATIN CALCIUM 10 MG/1
10 TABLET, COATED ORAL EVERY EVENING
Qty: 90 TABLET | Refills: 1 | Status: SHIPPED | OUTPATIENT
Start: 2022-06-28 | End: 2023-01-13 | Stop reason: SDUPTHER

## 2022-06-28 RX ORDER — LEVOTHYROXINE SODIUM 112 UG/1
TABLET ORAL
Qty: 108 TABLET | Refills: 1 | Status: SHIPPED | OUTPATIENT
Start: 2022-06-28 | End: 2022-09-20 | Stop reason: DRUGHIGH

## 2022-06-28 RX ORDER — GABAPENTIN 300 MG/1
300 CAPSULE ORAL 2 TIMES DAILY
Qty: 180 CAPSULE | Refills: 0 | Status: SHIPPED | OUTPATIENT
Start: 2022-06-28 | End: 2022-10-14

## 2022-06-28 RX ORDER — METFORMIN HYDROCHLORIDE 500 MG/1
500 TABLET, EXTENDED RELEASE ORAL
Qty: 30 TABLET | Refills: 0 | Status: SHIPPED | OUTPATIENT
Start: 2022-06-28 | End: 2023-01-13 | Stop reason: SDUPTHER

## 2022-06-28 NOTE — PROGRESS NOTES
Subjective   Anu Adam is a 52 y.o. female.   Chief Complaint   Patient presents with   • Hyperlipidemia   • Diabetes   • Depression   • Degenerative disc disease   • Nasal Congestion       History of Present Illness     1.  Diabetes type 2-diagnosed in 8/2020.  She does not exercise regularly.  She is on Metformin extended release at 500 mg a day.  She takes it every day. She has no side effects.  No diarrhea.  She had diarrhea on Metformin  immediately release.  Sugars at home are in 120s.  , A1c at 6.5 from 6.6.  Microalbumin 17.2.  She had eye exam 6/2022.  She had Pneumovax in August 2020.     2.  Hyperlipidemia-patient is on Crestor at 10 mg a day.  She takes it every day.  She has no side effects. LDL 65 from 119 from 70 from 79, HDL 33.  LFTs normal.    3.  Lumbar degenerative disc disease-patient had microdiscectomy at L3-L4 in February 2020 by Dr. Mcgraw.  She was started on gabapentin at 300 mg twice a day after surgery.  It controled her symptoms.  When she saw Dr. Mcgraw last time, they advised her to try weaning off.  She tried to do it as advised, but after 1 to 2 weeks of decreasing dose from twice a day to once a day she started having right leg pain.  It was the same as prior to initiation of therapy.  So she increased it back to 300 mg twice a day as advised by Dr. Ramos.  All symptoms resolved after she did it.  She has no back pain, no leg pain.  She has no side effects from gabapentin.    It was suggested by Dr. Mcgraw that will take over prescribing it.     She takes it as prescribed.  It controls her symptoms.  She has no side effects.  No drowsiness.       4. Depression with anxiety- on sertraline at 100 mg a day.  She takes it every day.  She has no side effects.  No suicidal ideations, no aggressive behaviors. Mood is normal most of the time.  No excessive worries. PHQ-9 is at 4, GAY-7 at 3.    5.  Congestion, ears fullness, sinus pressure-symptoms started 7 to  10 days ago.  Intensity of symptoms goes up and down.  She has postnasal drainage, clear to yellowish nasal drainage.  No fever.  No cough.  No shortness of breath.  She takes Mucinex DM, Flonase and Astepro and ibuprofen/Tylenol.  Tested for COVID at home last week and it was negative.    6. hypothyroidism- patient is on levothyroxine 112 µg a day.  She takes 1 tablet 5 days a week, 1.5 tablet twice a week. She takes it on empty stomach and does not eat for at least an hour after taking it.  TSH is 0.116, free T4 at 2.15.    The following portions of the patient's history were reviewed and updated as appropriate: allergies, current medications, past family history, past medical history, past social history, past surgical history and problem list.    Review of Systems   Constitutional: Negative for fever.   HENT: Positive for congestion, postnasal drip and sinus pressure.    Respiratory: Negative for cough and shortness of breath.    Cardiovascular: Negative for chest pain and palpitations.   Musculoskeletal: Negative for myalgias.   Neurological: Negative for light-headedness.         Objective   Wt Readings from Last 3 Encounters:   06/28/22 73.5 kg (162 lb)   04/13/22 75.3 kg (166 lb)   02/11/22 74.4 kg (164 lb)      Vitals:    06/28/22 0730   BP: 112/80   Pulse: 72   Temp: 97.5 °F (36.4 °C)   SpO2: 99%     Temp Readings from Last 3 Encounters:   06/28/22 97.5 °F (36.4 °C)   04/13/22 97.5 °F (36.4 °C)   02/11/22 98 °F (36.7 °C)     BP Readings from Last 3 Encounters:   06/28/22 112/80   04/13/22 110/70   02/11/22 100/70     Pulse Readings from Last 3 Encounters:   06/28/22 72   04/13/22 76   02/11/22 87     Body mass index is 29.62 kg/m².    Physical Exam  Constitutional:       Appearance: She is well-developed. She is obese.   HENT:      Head: Normocephalic and atraumatic.      Right Ear: Tympanic membrane, ear canal and external ear normal. No middle ear effusion. Tympanic membrane is not erythematous.       Left Ear: Tympanic membrane, ear canal and external ear normal.  No middle ear effusion. Tympanic membrane is not erythematous.      Nose:      Right Sinus: No maxillary sinus tenderness or frontal sinus tenderness.      Left Sinus: No maxillary sinus tenderness or frontal sinus tenderness.      Mouth/Throat:      Pharynx: No posterior oropharyngeal erythema.   Neck:      Thyroid: No thyromegaly.      Vascular: No carotid bruit.   Cardiovascular:      Rate and Rhythm: Normal rate and regular rhythm.      Heart sounds: Normal heart sounds.   Pulmonary:      Effort: Pulmonary effort is normal.      Breath sounds: Normal breath sounds.   Musculoskeletal:      Cervical back: Neck supple.   Skin:     General: Skin is warm and dry.   Neurological:      Mental Status: She is alert and oriented to person, place, and time.   Psychiatric:         Behavior: Behavior normal.         Assessment & Plan   Diagnoses and all orders for this visit:    1. Controlled type 2 diabetes mellitus with microalbuminuria, without long-term current use of insulin (HCC) (Primary)    2. Hypercholesterolemia    3. Mixed anxiety depressive disorder    4. DDD (degenerative disc disease), lumbar    5. Acquired hypothyroidism  -     Thyroid Cascade Profile; Future    6. Allergic rhinitis, unspecified seasonality, unspecified trigger    Other orders  -     levothyroxine (Synthroid) 112 MCG tablet; One tablet daily except Sunday when you take 1.5tb  Dispense: 108 tablet; Refill: 1  -     rosuvastatin (CRESTOR) 10 MG tablet; Take 1 tablet by mouth Every Evening.  Dispense: 90 tablet; Refill: 1  -     lisinopril (Zestril) 2.5 MG tablet; Take 1 tablet by mouth Daily.  Dispense: 90 tablet; Refill: 1  -     metFORMIN ER (GLUCOPHAGE-XR) 500 MG 24 hr tablet; Take 1 tablet by mouth Daily With Breakfast.  Dispense: 30 tablet; Refill: 0  -     gabapentin (NEURONTIN) 300 MG capsule; Take 1 capsule by mouth 2 (Two) Times a Day.  Dispense: 180 capsule; Refill:  0        1.  DM type II-continue current treatment.  2.  Hyperlipidemia-continue current treatment.  Exercise can help to improve HDL.  Follow-up in 6 months.  3.  Depression with anxiety-continue current treatment.  Follow-up in 6 months.  4.  Degenerative disc disease-continue current treatment.  Follow-up in 6 months.  5.  Congestion/sinus pressure-likely due to allergies, we are checking COVID test to be complete.  Symptomatic treatment.  Follow-up as needed.  6.  Hypothyroidism-uncontrolled.  She will take levothyroxine 112, 1 tablet 6 days a week, 1.5 tablet on Sundays.  Labs in 3 months.  Follow-up in 6 months

## 2022-06-30 LAB
LABCORP SARS-COV-2, NAA 2 DAY TAT: NORMAL
SARS-COV-2 RNA RESP QL NAA+PROBE: NOT DETECTED

## 2022-07-05 RX ORDER — ESTRADIOL 1 MG/1
TABLET ORAL
Qty: 90 TABLET | Refills: 3 | Status: SHIPPED | OUTPATIENT
Start: 2022-07-05 | End: 2022-07-14 | Stop reason: SDUPTHER

## 2022-07-14 RX ORDER — ESTRADIOL 1 MG/1
1 TABLET ORAL DAILY
Qty: 90 TABLET | Refills: 1 | Status: SHIPPED | OUTPATIENT
Start: 2022-07-14 | End: 2022-12-22 | Stop reason: SDUPTHER

## 2022-07-29 RX ORDER — LISINOPRIL 2.5 MG/1
2.5 TABLET ORAL DAILY
Qty: 90 TABLET | Refills: 1 | Status: SHIPPED | OUTPATIENT
Start: 2022-07-29 | End: 2023-01-13 | Stop reason: SDUPTHER

## 2022-07-29 RX ORDER — SERTRALINE HYDROCHLORIDE 100 MG/1
TABLET, FILM COATED ORAL
Qty: 90 TABLET | Refills: 1 | Status: SHIPPED | OUTPATIENT
Start: 2022-07-29 | End: 2023-01-13 | Stop reason: SDUPTHER

## 2022-08-01 RX ORDER — MEDROXYPROGESTERONE ACETATE 2.5 MG/1
TABLET ORAL
Qty: 90 TABLET | Refills: 1 | Status: SHIPPED | OUTPATIENT
Start: 2022-08-01 | End: 2022-12-22 | Stop reason: SDUPTHER

## 2022-09-08 DIAGNOSIS — E03.9 ACQUIRED HYPOTHYROIDISM: ICD-10-CM

## 2022-09-18 LAB
T4 FREE SERPL-MCNC: 2.04 NG/DL (ref 0.82–1.77)
TSH SERPL DL<=0.005 MIU/L-ACNC: 0.17 UIU/ML (ref 0.45–4.5)

## 2022-09-20 DIAGNOSIS — E03.9 ACQUIRED HYPOTHYROIDISM: Primary | ICD-10-CM

## 2022-09-20 RX ORDER — LEVOTHYROXINE SODIUM 112 UG/1
TABLET ORAL
Qty: 90 TABLET | Refills: 1 | OUTPATIENT
Start: 2022-09-20 | End: 2023-01-13 | Stop reason: SDUPTHER

## 2022-10-14 RX ORDER — GABAPENTIN 300 MG/1
CAPSULE ORAL
Qty: 180 CAPSULE | Refills: 0 | Status: SHIPPED | OUTPATIENT
Start: 2022-10-14 | End: 2023-01-13 | Stop reason: SDUPTHER

## 2022-10-17 ENCOUNTER — TELEPHONE (OUTPATIENT)
Dept: OBSTETRICS AND GYNECOLOGY | Age: 52
End: 2022-10-17

## 2022-11-15 DIAGNOSIS — E03.9 ACQUIRED HYPOTHYROIDISM: ICD-10-CM

## 2022-11-15 DIAGNOSIS — E11.29 CONTROLLED TYPE 2 DIABETES MELLITUS WITH MICROALBUMINURIA, WITHOUT LONG-TERM CURRENT USE OF INSULIN: ICD-10-CM

## 2022-11-15 DIAGNOSIS — R80.9 CONTROLLED TYPE 2 DIABETES MELLITUS WITH MICROALBUMINURIA, WITHOUT LONG-TERM CURRENT USE OF INSULIN: ICD-10-CM

## 2022-11-15 DIAGNOSIS — E11.29 CONTROLLED TYPE 2 DIABETES MELLITUS WITH MICROALBUMINURIA, WITHOUT LONG-TERM CURRENT USE OF INSULIN: Primary | ICD-10-CM

## 2022-11-15 DIAGNOSIS — R80.9 CONTROLLED TYPE 2 DIABETES MELLITUS WITH MICROALBUMINURIA, WITHOUT LONG-TERM CURRENT USE OF INSULIN: Primary | ICD-10-CM

## 2022-11-22 ENCOUNTER — OFFICE VISIT (OUTPATIENT)
Dept: INTERNAL MEDICINE | Facility: CLINIC | Age: 52
End: 2022-11-22

## 2022-11-22 VITALS
SYSTOLIC BLOOD PRESSURE: 122 MMHG | BODY MASS INDEX: 29.83 KG/M2 | DIASTOLIC BLOOD PRESSURE: 68 MMHG | OXYGEN SATURATION: 98 % | HEART RATE: 104 BPM | TEMPERATURE: 97.1 F | WEIGHT: 162.1 LBS | HEIGHT: 62 IN

## 2022-11-22 DIAGNOSIS — R05.9 COUGH, UNSPECIFIED TYPE: ICD-10-CM

## 2022-11-22 DIAGNOSIS — J01.00 ACUTE NON-RECURRENT MAXILLARY SINUSITIS: Primary | ICD-10-CM

## 2022-11-22 LAB
EXPIRATION DATE: NORMAL
FLUAV AG UPPER RESP QL IA.RAPID: NOT DETECTED
FLUBV AG UPPER RESP QL IA.RAPID: NOT DETECTED
INTERNAL CONTROL: NORMAL
Lab: NORMAL
SARS-COV-2 AG UPPER RESP QL IA.RAPID: NOT DETECTED

## 2022-11-22 PROCEDURE — 99213 OFFICE O/P EST LOW 20 MIN: CPT | Performed by: NURSE PRACTITIONER

## 2022-11-22 PROCEDURE — 87428 SARSCOV & INF VIR A&B AG IA: CPT | Performed by: NURSE PRACTITIONER

## 2022-11-22 RX ORDER — METHYLPREDNISOLONE 4 MG/1
TABLET ORAL
COMMUNITY
Start: 2022-11-18 | End: 2022-12-22

## 2022-11-22 RX ORDER — PSEUDOEPHED/ACETAMINOPH/DIPHEN 30MG-500MG
TABLET ORAL
COMMUNITY
Start: 2022-11-18 | End: 2022-12-22

## 2022-11-22 RX ORDER — AMOXICILLIN 875 MG/1
875 TABLET, COATED ORAL 2 TIMES DAILY
Qty: 20 TABLET | Refills: 0 | Status: SHIPPED | OUTPATIENT
Start: 2022-11-22 | End: 2022-12-22

## 2022-11-22 NOTE — PROGRESS NOTES
Subjective   Anu Adam is a 52 y.o. female. Patient is here today for   Chief Complaint   Patient presents with   • Cough     Congestion started last Wednesday    .    History of Present Illness   New problem to this provider: C/o fever as high as 101.6F last week. She did a teledoc visit and was treated for a URI and possible flu. Prescribed medrol dose pack.   She has also had nasal congestion, fatigue. She is feeling better today. Today is the last day of steroid. Dry cough. She has also taken Mucinex DM, nasal spray.     The following portions of the patient's history were reviewed and updated as appropriate: allergies, current medications, past family history, past medical history, past social history, past surgical history and problem list.    Review of Systems    Objective   Vitals:    11/22/22 1538   BP: 122/68   Pulse: 104   Temp: 97.1 °F (36.2 °C)   SpO2: 98%     Body mass index is 29.64 kg/m².  Physical Exam  Vitals and nursing note reviewed.   Constitutional:       Appearance: Normal appearance. She is well-developed.   HENT:      Right Ear: Tympanic membrane and ear canal normal.      Left Ear: Tympanic membrane and ear canal normal.      Mouth/Throat:      Pharynx: Oropharynx is clear.   Cardiovascular:      Rate and Rhythm: Normal rate and regular rhythm.      Heart sounds: Normal heart sounds.   Pulmonary:      Effort: Pulmonary effort is normal.      Breath sounds: Normal breath sounds.   Lymphadenopathy:      Cervical: Cervical adenopathy present.   Skin:     General: Skin is warm and dry.   Neurological:      Mental Status: She is alert and oriented to person, place, and time.   Psychiatric:         Speech: Speech normal.         Behavior: Behavior normal.         Thought Content: Thought content normal.       Results for orders placed or performed in visit on 11/22/22   POCT SARS-CoV-2 Antigen BRIA + Flu    Specimen: Swab   Result Value Ref Range    SARS Antigen Not Detected Not Detected,  Presumptive Negative    Influenza A Antigen BRIA Not Detected Not Detected    Influenza B Antigen BRIA Not Detected Not Detected    Internal Control Passed Passed    Lot Number [id number]     Expiration Date 3,102,023        Assessment & Plan   Diagnoses and all orders for this visit:    1. Acute non-recurrent maxillary sinusitis (Primary)  -     amoxicillin (AMOXIL) 875 MG tablet; Take 1 tablet by mouth 2 (Two) Times a Day.  Dispense: 20 tablet; Refill: 0    2. Cough, unspecified type  -     POCT SARS-CoV-2 Antigen BRIA + Flu      Patient's symptoms seem to be improving, so is likely viral.  Patient was given an antibiotic due to the long holiday weekend to take if her symptoms do not resolve in the next couple of days.  Increase fluids.

## 2022-12-13 DIAGNOSIS — R92.8 ABNORMAL MAMMOGRAM: Primary | ICD-10-CM

## 2022-12-13 NOTE — TELEPHONE ENCOUNTER
Pt scheduled for screening on 12/22 and Jessie asked me to schedule diagnostic since she missed her appt in Sept.  I called pt and she said she already called the office and she cancelled that appt due to cost.  I had not seen this message before I called her.    I spoke w/ Jessie and she said she will document that the patient is refusing a diagnostic and she will do a screening on 12/22 as scheduled.    Pt is aware.

## 2022-12-22 ENCOUNTER — PROCEDURE VISIT (OUTPATIENT)
Dept: OBSTETRICS AND GYNECOLOGY | Age: 52
End: 2022-12-22

## 2022-12-22 ENCOUNTER — APPOINTMENT (OUTPATIENT)
Dept: WOMENS IMAGING | Facility: HOSPITAL | Age: 52
End: 2022-12-22
Payer: COMMERCIAL

## 2022-12-22 ENCOUNTER — OFFICE VISIT (OUTPATIENT)
Dept: OBSTETRICS AND GYNECOLOGY | Age: 52
End: 2022-12-22

## 2022-12-22 VITALS
BODY MASS INDEX: 30 KG/M2 | HEIGHT: 62 IN | WEIGHT: 163 LBS | DIASTOLIC BLOOD PRESSURE: 74 MMHG | SYSTOLIC BLOOD PRESSURE: 118 MMHG

## 2022-12-22 DIAGNOSIS — Z12.4 SCREENING FOR CERVICAL CANCER: ICD-10-CM

## 2022-12-22 DIAGNOSIS — Z79.890 HORMONE REPLACEMENT THERAPY (HRT): ICD-10-CM

## 2022-12-22 DIAGNOSIS — Z01.419 WELL WOMAN EXAM WITH ROUTINE GYNECOLOGICAL EXAM: Primary | ICD-10-CM

## 2022-12-22 DIAGNOSIS — N64.4 BREAST PAIN, LEFT: ICD-10-CM

## 2022-12-22 DIAGNOSIS — Z12.31 VISIT FOR SCREENING MAMMOGRAM: Primary | ICD-10-CM

## 2022-12-22 PROCEDURE — 77067 SCR MAMMO BI INCL CAD: CPT | Performed by: NURSE PRACTITIONER

## 2022-12-22 PROCEDURE — 77067 SCR MAMMO BI INCL CAD: CPT | Performed by: RADIOLOGY

## 2022-12-22 PROCEDURE — 77063 BREAST TOMOSYNTHESIS BI: CPT | Performed by: RADIOLOGY

## 2022-12-22 PROCEDURE — 99396 PREV VISIT EST AGE 40-64: CPT | Performed by: NURSE PRACTITIONER

## 2022-12-22 PROCEDURE — 77063 BREAST TOMOSYNTHESIS BI: CPT | Performed by: NURSE PRACTITIONER

## 2022-12-22 RX ORDER — MEDROXYPROGESTERONE ACETATE 2.5 MG/1
2.5 TABLET ORAL DAILY
Qty: 90 TABLET | Refills: 3 | Status: SHIPPED | OUTPATIENT
Start: 2022-12-22

## 2022-12-22 RX ORDER — ESTRADIOL 1 MG/1
1 TABLET ORAL DAILY
Qty: 90 TABLET | Refills: 3 | Status: SHIPPED | OUTPATIENT
Start: 2022-12-22

## 2022-12-22 RX ORDER — MULTIPLE VITAMINS W/ MINERALS TAB 9MG-400MCG
1 TAB ORAL DAILY
COMMUNITY

## 2022-12-22 NOTE — PROGRESS NOTES
Subjective     Chief Complaint   Patient presents with   • Gynecologic Exam     AE, last pap 2019 neg/hpv neg,  mg 2022, csy 2020  Cc: left breast sore        History of Present Illness    Anu Adam is a 52 y.o.  who presents for annual exam.    Doing well  Mammogram today  Had cyst aspiration back in march. Was supposed to have follow up dx mammo but states it was going to be $600 and she could not afford so she declined.  Has noted some soreness near where she had her aspiration over the past couple of months   Denies masses, skin or nipple changes or nipple discharge  Has one caffienated beverage a day, no smoking or etoh use  Pap due  Colonoscopy UTD  On HRT for HF's and wishes to continue, needs refilled  No bleeding  Brother passed away from lung cancer over the summer  No other GYN concerns or complaints     Obstetric History:  OB History        1    Para   1    Term   1            AB        Living   1       SAB        IAB        Ectopic        Molar        Multiple        Live Births   1               Menstrual History:     Patient's last menstrual period was 2013 (lmp unknown).         Current contraception: post menopausal status  History of abnormal Pap smear: no  Received Gardasil immunization: no  Perform regular self breast exam: yes - .  Family history of uterine or ovarian cancer: no  Family History of colon cancer: no  Family history of breast cancer: no    Mammogram: done today.  Colonoscopy: up to date.  DEXA: not indicated.    Exercise: moderately active or not active   Calcium/Vitamin D: adequate intake    The following portions of the patient's history were reviewed and updated as appropriate: allergies, current medications, past family history, past medical history, past social history, past surgical history and problem list.    Review of Systems   Constitutional: Negative.    Respiratory: Negative.    Cardiovascular: Negative.     Gastrointestinal: Negative.    Genitourinary: Negative.    Skin: Negative.         Left breast tenderness   Psychiatric/Behavioral: Negative.            Objective   Physical Exam  Constitutional:       General: She is awake.      Appearance: Normal appearance. She is well-developed.   HENT:      Head: Normocephalic and atraumatic.      Nose: Nose normal.   Neck:      Thyroid: No thyroid mass, thyromegaly or thyroid tenderness.   Cardiovascular:      Rate and Rhythm: Normal rate and regular rhythm.      Pulses: Normal pulses.      Heart sounds: Normal heart sounds.   Pulmonary:      Effort: Pulmonary effort is normal.      Breath sounds: Normal breath sounds.   Chest:   Breasts:     Breasts are symmetrical.      Right: Normal. No swelling, bleeding, inverted nipple, mass, nipple discharge, skin change or tenderness.      Left: Normal. No swelling, bleeding, inverted nipple, mass, nipple discharge, skin change or tenderness.          Comments: Tenderness to palpation. No lumps/masses, skin changes noted  Abdominal:      General: Abdomen is flat. Bowel sounds are normal.      Palpations: Abdomen is soft.      Tenderness: There is no abdominal tenderness.   Genitourinary:     General: Normal vulva.      Labia:         Right: No rash, tenderness, lesion or injury.         Left: No rash, tenderness, lesion or injury.       Urethra: No prolapse, urethral pain, urethral swelling or urethral lesion.      Vagina: Normal. No signs of injury. No vaginal discharge, erythema, tenderness, bleeding, lesions or prolapsed vaginal walls.      Cervix: No discharge, friability, lesion, erythema or cervical bleeding.      Uterus: Normal. Not enlarged, not tender and no uterine prolapse.       Adnexa: Right adnexa normal and left adnexa normal.        Right: No mass, tenderness or fullness.          Left: No mass, tenderness or fullness.        Rectum: Normal. No mass.   Musculoskeletal:      Cervical back: Normal range of motion and  "neck supple.   Lymphadenopathy:      Upper Body:      Right upper body: No supraclavicular adenopathy.      Left upper body: No supraclavicular adenopathy.   Skin:     General: Skin is warm and dry.   Neurological:      General: No focal deficit present.      Mental Status: She is alert and oriented to person, place, and time.   Psychiatric:         Mood and Affect: Mood normal.         Behavior: Behavior normal. Behavior is cooperative.         Thought Content: Thought content normal.         Judgment: Judgment normal.         /74   Ht 157.5 cm (62\")   Wt 73.9 kg (163 lb)   LMP 11/16/2013 (LMP Unknown) Comment: patient states her last period was six years ago and is post menopausal   BMI 29.81 kg/m²     Assessment & Plan   Diagnoses and all orders for this visit:    1. Well woman exam with routine gynecological exam (Primary)    2. Screening for cervical cancer  -     IGP, Apt HPV,rfx 16 / 18,45    3. Breast pain, left    4. Hormone replacement therapy (HRT)    Other orders  -     estradiol (ESTRACE) 1 MG tablet; Take 1 tablet by mouth Daily.  Dispense: 90 tablet; Refill: 3  -     medroxyPROGESTERone (PROVERA) 2.5 MG tablet; Take 1 tablet by mouth Daily.  Dispense: 90 tablet; Refill: 3        All questions answered.  Breast self exam technique reviewed and patient encouraged to perform self-exam monthly.  Discussed healthy lifestyle modifications.  Recommended 30 minutes of aerobic exercise five times per week.  Discussed calcium needs to prevent osteoporosis.    -Pap and mammogram today  -If mammo normal and pain persists - will refer to breast surgeon for further evaluation   -F/u 1 year for annual exam                "

## 2022-12-23 ENCOUNTER — TELEPHONE (OUTPATIENT)
Dept: INTERNAL MEDICINE | Facility: CLINIC | Age: 52
End: 2022-12-23

## 2022-12-23 DIAGNOSIS — J01.00 ACUTE NON-RECURRENT MAXILLARY SINUSITIS: Primary | ICD-10-CM

## 2022-12-23 RX ORDER — AMOXICILLIN 875 MG/1
875 TABLET, COATED ORAL 2 TIMES DAILY
Qty: 14 TABLET | Refills: 0 | Status: SHIPPED | OUTPATIENT
Start: 2022-12-23 | End: 2023-01-13

## 2022-12-23 NOTE — TELEPHONE ENCOUNTER
----- Message from Cheri Hull MA sent at 12/22/2022  5:18 PM EST -----  Regarding: FW: Antibiotic  Contact: [phone]    ----- Message -----  From: Anu Adam  Sent: 12/22/2022   5:02 PM EST  To: [facility]  Subject: Antibiotic                                       Hello!    I was in the office to see you on November 22 and you prescribed me amoxicillin. I finished it three weeks ago. I am still not feeling well. My left ear is hurting, and that side of my face is tender to touch. Would it be possible to get another round of antibiotic or do I need to come back in the office.     Thank you!  Anu Adam

## 2023-01-01 LAB
BUN SERPL-MCNC: 14 MG/DL (ref 6–24)
BUN/CREAT SERPL: 19 (ref 9–23)
CALCIUM SERPL-MCNC: 9.9 MG/DL (ref 8.7–10.2)
CHLORIDE SERPL-SCNC: 104 MMOL/L (ref 96–106)
CO2 SERPL-SCNC: 22 MMOL/L (ref 20–29)
CREAT SERPL-MCNC: 0.74 MG/DL (ref 0.57–1)
EGFRCR SERPLBLD CKD-EPI 2021: 97 ML/MIN/1.73
GLUCOSE SERPL-MCNC: 161 MG/DL (ref 70–99)
HBA1C MFR BLD: 7.1 % (ref 4.8–5.6)
POTASSIUM SERPL-SCNC: 4.7 MMOL/L (ref 3.5–5.2)
SODIUM SERPL-SCNC: 141 MMOL/L (ref 134–144)
TSH SERPL DL<=0.005 MIU/L-ACNC: 0.86 UIU/ML (ref 0.45–4.5)

## 2023-01-03 LAB
CYTOLOGIST CVX/VAG CYTO: NORMAL
CYTOLOGY CVX/VAG DOC CYTO: NORMAL
CYTOLOGY CVX/VAG DOC THIN PREP: NORMAL
DX ICD CODE: NORMAL
HIV 1 & 2 AB SER-IMP: NORMAL
HPV I/H RISK 4 DNA CVX QL PROBE+SIG AMP: NEGATIVE
OTHER STN SPEC: NORMAL
STAT OF ADQ CVX/VAG CYTO-IMP: NORMAL

## 2023-01-13 ENCOUNTER — OFFICE VISIT (OUTPATIENT)
Dept: INTERNAL MEDICINE | Facility: CLINIC | Age: 53
End: 2023-01-13
Payer: COMMERCIAL

## 2023-01-13 VITALS
SYSTOLIC BLOOD PRESSURE: 122 MMHG | HEART RATE: 89 BPM | DIASTOLIC BLOOD PRESSURE: 60 MMHG | OXYGEN SATURATION: 98 % | TEMPERATURE: 97.7 F | WEIGHT: 166 LBS | BODY MASS INDEX: 30.55 KG/M2 | HEIGHT: 62 IN

## 2023-01-13 DIAGNOSIS — F41.8 MIXED ANXIETY DEPRESSIVE DISORDER: ICD-10-CM

## 2023-01-13 DIAGNOSIS — E03.9 ACQUIRED HYPOTHYROIDISM: ICD-10-CM

## 2023-01-13 DIAGNOSIS — M51.36 DDD (DEGENERATIVE DISC DISEASE), LUMBAR: ICD-10-CM

## 2023-01-13 DIAGNOSIS — E11.29 CONTROLLED TYPE 2 DIABETES MELLITUS WITH MICROALBUMINURIA, WITHOUT LONG-TERM CURRENT USE OF INSULIN: Primary | ICD-10-CM

## 2023-01-13 DIAGNOSIS — E78.00 HYPERCHOLESTEROLEMIA: ICD-10-CM

## 2023-01-13 DIAGNOSIS — R80.9 CONTROLLED TYPE 2 DIABETES MELLITUS WITH MICROALBUMINURIA, WITHOUT LONG-TERM CURRENT USE OF INSULIN: Primary | ICD-10-CM

## 2023-01-13 PROCEDURE — 99214 OFFICE O/P EST MOD 30 MIN: CPT | Performed by: FAMILY MEDICINE

## 2023-01-13 PROCEDURE — 90677 PCV20 VACCINE IM: CPT | Performed by: FAMILY MEDICINE

## 2023-01-13 PROCEDURE — 90471 IMMUNIZATION ADMIN: CPT | Performed by: FAMILY MEDICINE

## 2023-01-13 RX ORDER — METFORMIN HYDROCHLORIDE 500 MG/1
500 TABLET, EXTENDED RELEASE ORAL
Qty: 90 TABLET | Refills: 1 | Status: SHIPPED | OUTPATIENT
Start: 2023-01-13

## 2023-01-13 RX ORDER — ROSUVASTATIN CALCIUM 10 MG/1
10 TABLET, COATED ORAL EVERY EVENING
Qty: 90 TABLET | Refills: 1 | Status: SHIPPED | OUTPATIENT
Start: 2023-01-13

## 2023-01-13 RX ORDER — LISINOPRIL 2.5 MG/1
2.5 TABLET ORAL DAILY
Qty: 90 TABLET | Refills: 1 | Status: SHIPPED | OUTPATIENT
Start: 2023-01-13 | End: 2023-01-16

## 2023-01-13 RX ORDER — SERTRALINE HYDROCHLORIDE 100 MG/1
100 TABLET, FILM COATED ORAL DAILY
Qty: 90 TABLET | Refills: 1 | Status: SHIPPED | OUTPATIENT
Start: 2023-01-13

## 2023-01-13 RX ORDER — GABAPENTIN 300 MG/1
300 CAPSULE ORAL 2 TIMES DAILY
Qty: 180 CAPSULE | Refills: 1 | Status: SHIPPED | OUTPATIENT
Start: 2023-01-13

## 2023-01-13 RX ORDER — LEVOTHYROXINE SODIUM 112 UG/1
TABLET ORAL
Qty: 90 TABLET | Refills: 1 | OUTPATIENT
Start: 2023-01-13 | End: 2023-01-16

## 2023-01-13 NOTE — PROGRESS NOTES
Subjective   Anu Adam is a 52 y.o. female.   Chief Complaint   Patient presents with   • Hyperlipidemia   • Hypertension   • Hypothyroidism   • Spasms   • Depression   • Diabetes       History of Present Illness     1. Diabetes type 2-diagnosed in 2020.  She does not exercise regularly.  She is on Metformin extended release at 500 mg a day.  She takes it every day. She has no side effects.  No diarrhea.  She had diarrhea on Metformin  immediately release.  Sugars at home are in 160s.  FBS 161, A1c at  7.1 from 6.5 from 6.6.  She was recently treated with prednisone for upper respiratory infection.  She gained a few pounds.  She was eating more from stress.  Her brother  in August  from lung cancer.    She has supportive family.  She had eye exam 2022.  She had Pneumovax in 2020.     2.  Hyperlipidemia-patient is on Crestor at 10 mg a day.  She takes it every day.  She has no side effects.      3.  Lumbar degenerative disc disease-patient had microdiscectomy at L3-L4 in 2020 by Dr. Mcgraw.  She was started on gabapentin at 300 mg twice a day after surgery.  It controled her symptoms.  When she saw Dr. Mcgraw last time, they advised her to try weaning off.  She tried to do it as advised, but after 1 to 2 weeks of decreasing dose from twice a day to once a day she started having right leg pain.  It was the same as prior to initiation of therapy.  So she increased it back to 300 mg twice a day as advised by Dr. Ramos.  All symptoms resolved after she did it.  It was suggested by Dr. Mcgraw that will take over prescribing it.    No change from last office visit.  Gabapentin helps.  She has no side effects.  No drowsiness     4. Depression with anxiety- on sertraline at 100 mg a day.  She takes it every day.  She has no side effects.  No suicidal ideations, no aggressive behaviors. Mood is normal most of the time.  No excessive worries. PHQ-9 is at 3, GAY-7 at 1.     5.  Hypothyroidism- patient is on levothyroxine 112 µg a day.  She takes 1 tablet 7 days a week.  She takes it on empty stomach and does not eat for at least an hour after taking it.  TSH is 0.86.    The following portions of the patient's history were reviewed and updated as appropriate: allergies, current medications, past family history, past medical history, past social history, past surgical history and problem list.    Review of Systems   Constitutional: Negative.    Respiratory: Negative for shortness of breath.    Cardiovascular: Negative for chest pain and palpitations.   Neurological: Negative for light-headedness.   Psychiatric/Behavioral: Negative for suicidal ideas.         Objective   Wt Readings from Last 3 Encounters:   01/13/23 75.3 kg (166 lb)   12/22/22 73.9 kg (163 lb)   11/22/22 73.5 kg (162 lb 1.6 oz)      Vitals:    01/13/23 0716   BP: 122/60   Pulse: 89   Temp: 97.7 °F (36.5 °C)   SpO2: 98%     Temp Readings from Last 3 Encounters:   01/13/23 97.7 °F (36.5 °C)   11/22/22 97.1 °F (36.2 °C)   06/28/22 97.5 °F (36.4 °C)     BP Readings from Last 3 Encounters:   01/13/23 122/60   12/22/22 118/74   11/22/22 122/68     Pulse Readings from Last 3 Encounters:   01/13/23 89   11/22/22 104   06/28/22 72     Body mass index is 30.35 kg/m².    Physical Exam  Constitutional:       Appearance: She is well-developed.   Neck:      Thyroid: No thyromegaly.      Vascular: No carotid bruit.   Cardiovascular:      Rate and Rhythm: Normal rate and regular rhythm.      Heart sounds: Normal heart sounds.   Pulmonary:      Effort: Pulmonary effort is normal.      Breath sounds: Normal breath sounds.   Skin:     General: Skin is warm and dry.   Neurological:      Mental Status: She is alert and oriented to person, place, and time.   Psychiatric:         Behavior: Behavior normal.         Assessment & Plan   Diagnoses and all orders for this visit:    1. Controlled type 2 diabetes mellitus with microalbuminuria, without  long-term current use of insulin (HCC) (Primary)  -     Cancel: Hemoglobin A1c; Future  -     Cancel: MicroAlbumin, Urine, Random - Urine, Clean Catch; Future  -     Cancel: Comprehensive Metabolic Panel; Future  -     Hemoglobin A1c; Future  -     Comprehensive Metabolic Panel; Future  -     MicroAlbumin, Urine, Random - Urine, Clean Catch; Future    2. Hypercholesterolemia  -     Cancel: Comprehensive Metabolic Panel; Future  -     Cancel: Lipid Panel With LDL / HDL Ratio; Future  -     Lipid Panel With LDL / HDL Ratio; Future  -     Comprehensive Metabolic Panel; Future    3. Acquired hypothyroidism    4. Mixed anxiety depressive disorder    5. DDD (degenerative disc disease), lumbar    Other orders  -     sertraline (ZOLOFT) 100 MG tablet; Take 1 tablet by mouth Daily.  Dispense: 90 tablet; Refill: 1  -     levothyroxine (Synthroid) 112 MCG tablet; One tablet daily  Dispense: 90 tablet; Refill: 1  -     gabapentin (NEURONTIN) 300 MG capsule; Take 1 capsule by mouth 2 (Two) Times a Day.  Dispense: 180 capsule; Refill: 1  -     rosuvastatin (CRESTOR) 10 MG tablet; Take 1 tablet by mouth Every Evening.  Dispense: 90 tablet; Refill: 1  -     lisinopril (PRINIVIL,ZESTRIL) 2.5 MG tablet; Take 1 tablet by mouth Daily.  Dispense: 90 tablet; Refill: 1  -     metFORMIN ER (GLUCOPHAGE-XR) 500 MG 24 hr tablet; Take 1 tablet by mouth Daily With Breakfast.  Dispense: 90 tablet; Refill: 1        1.  Diabetes type 2-uncontrolled.  Likely due to prednisone and more relaxed diet.  She we will continue current treatment.  She is motivated to improve her lifestyle.  She will improve her diet.  She is working on increasing exercise.  Labs in 3 months before office visit.  2.  Hyperlipidemia-continue current treatment.  Follow-up in 3 months.  3.  Depression with anxiety-continue current treatment.  Follow-up in 6 months.  4.  Degenerative disc disease-continue current treatment.  Follow-up in 3 months.  5.  Hypothyroidism-continue  current treatment.  Follow-up in 12 months.

## 2023-01-16 RX ORDER — LISINOPRIL 2.5 MG/1
2.5 TABLET ORAL DAILY
Qty: 90 TABLET | Refills: 1 | Status: SHIPPED | OUTPATIENT
Start: 2023-01-16

## 2023-01-16 RX ORDER — LEVOTHYROXINE SODIUM 112 UG/1
TABLET ORAL
Qty: 108 TABLET | Refills: 0 | Status: SHIPPED | OUTPATIENT
Start: 2023-01-16

## 2023-04-14 RX ORDER — LEVOTHYROXINE SODIUM 112 UG/1
TABLET ORAL
Qty: 108 TABLET | Refills: 0 | Status: SHIPPED | OUTPATIENT
Start: 2023-04-14

## 2023-05-04 ENCOUNTER — OFFICE VISIT (OUTPATIENT)
Dept: INTERNAL MEDICINE | Facility: CLINIC | Age: 53
End: 2023-05-04
Payer: COMMERCIAL

## 2023-05-04 VITALS
HEIGHT: 62 IN | DIASTOLIC BLOOD PRESSURE: 70 MMHG | TEMPERATURE: 97.6 F | WEIGHT: 166 LBS | HEART RATE: 78 BPM | OXYGEN SATURATION: 97 % | BODY MASS INDEX: 30.55 KG/M2 | SYSTOLIC BLOOD PRESSURE: 120 MMHG

## 2023-05-04 DIAGNOSIS — E78.00 HYPERCHOLESTEROLEMIA: Primary | ICD-10-CM

## 2023-05-04 DIAGNOSIS — E11.65 UNCONTROLLED TYPE 2 DIABETES MELLITUS WITH HYPERGLYCEMIA: ICD-10-CM

## 2023-05-04 PROCEDURE — 99214 OFFICE O/P EST MOD 30 MIN: CPT | Performed by: FAMILY MEDICINE

## 2023-05-04 RX ORDER — METFORMIN HYDROCHLORIDE 500 MG/1
1000 TABLET, EXTENDED RELEASE ORAL
Qty: 180 TABLET | Refills: 0 | Status: SHIPPED | OUTPATIENT
Start: 2023-05-04

## 2023-05-04 RX ORDER — ROSUVASTATIN CALCIUM 10 MG/1
10 TABLET, COATED ORAL EVERY EVENING
Qty: 90 TABLET | Refills: 0 | Status: SHIPPED | OUTPATIENT
Start: 2023-05-04

## 2023-05-04 NOTE — PROGRESS NOTES
Subjective   Anu Adam is a 53 y.o. female.   Chief Complaint   Patient presents with   • Diabetes   • Hyperlipidemia       History of Present Illness     1. Diabetes type 2-diagnosed in 8/2020.  She does not exercise regularly.  She is on Metformin extended release at 500 mg a day.  She takes it every day. She has no side effects.  No diarrhea.  She has 1 loose stool after first meal.  She had diarrhea on Metformin  immediately release.She is working on limiting Coke.  She is trying to make healthier choices.  She does not exercise regularly yet, but started walking more.  Sugars at home are in 120s.  FBS 125, A1c at 7.0 from 7.1 from 6.5 from 6.6.      She had eye exam 6/2022.  She had Pneumovax in August 2020.     2.  Hyperlipidemia-patient is on Crestor at 10 mg a day.  She takes it every day.  She has no side effects.  LDL 75, HDL 32.  AST 45, ALT 40.  She has more Tylenol lately.  She does not drink alcohol on a regular basis.       The following portions of the patient's history were reviewed and updated as appropriate: allergies, current medications, past family history, past medical history, past social history, past surgical history and problem list.    Review of Systems   Respiratory: Negative.    Cardiovascular: Negative.    Musculoskeletal: Negative for myalgias.         Objective   Wt Readings from Last 3 Encounters:   05/04/23 75.3 kg (166 lb)   01/13/23 75.3 kg (166 lb)   12/22/22 73.9 kg (163 lb)      Vitals:    05/04/23 0710   BP: 120/70   Pulse: 78   Temp: 97.6 °F (36.4 °C)   SpO2: 97%     Temp Readings from Last 3 Encounters:   05/04/23 97.6 °F (36.4 °C)   01/13/23 97.7 °F (36.5 °C)   11/22/22 97.1 °F (36.2 °C)     BP Readings from Last 3 Encounters:   05/04/23 120/70   01/13/23 122/60   12/22/22 118/74     Pulse Readings from Last 3 Encounters:   05/04/23 78   01/13/23 89   11/22/22 104     Body mass index is 30.35 kg/m².    Physical Exam  Constitutional:       Appearance: She is  well-developed.   Neck:      Vascular: No carotid bruit.   Cardiovascular:      Rate and Rhythm: Normal rate and regular rhythm.      Pulses: Normal pulses.      Heart sounds: Normal heart sounds.   Pulmonary:      Effort: Pulmonary effort is normal.      Breath sounds: Normal breath sounds.   Feet:      Comments: See diabetic foot form which will be scanned.  Skin:     General: Skin is warm and dry.   Neurological:      Mental Status: She is alert and oriented to person, place, and time.   Psychiatric:         Behavior: Behavior normal.         Assessment & Plan   Diagnoses and all orders for this visit:    1. Hypercholesterolemia (Primary)  -     Lipid Panel With LDL / HDL Ratio; Future    2. Uncontrolled type 2 diabetes mellitus with hyperglycemia  -     Hemoglobin A1c; Future  -     Comprehensive Metabolic Panel; Future  -     Microalbumin / Creatinine Urine Ratio - Urine, Clean Catch; Future    Other orders  -     metFORMIN ER (GLUCOPHAGE-XR) 500 MG 24 hr tablet; Take 2 tablets by mouth Daily With Breakfast.  Dispense: 180 tablet; Refill: 0  -     rosuvastatin (CRESTOR) 10 MG tablet; Take 1 tablet by mouth Every Evening.  Dispense: 90 tablet; Refill: 0        1.  Diabetes type 2- A1c above 7.  We are increasing dose of metforminXR to 1000 mg a day.  Increase exercise.  Try to get at least 30 minutes a day, 5 days a week.  Limit carbs.  Labs in 3 months before office visit.  2.  Hyperlipidemia- continue current treatment.  Avoid Tylenol.  Labs in 3 months before office visit.          BMI is >= 30 and <35. (Class 1 Obesity). The following options were offered after discussion;: weight loss educational material (shared in after visit summary)

## 2023-07-26 DIAGNOSIS — E11.65 UNCONTROLLED TYPE 2 DIABETES MELLITUS WITH HYPERGLYCEMIA: ICD-10-CM

## 2023-07-26 DIAGNOSIS — E78.00 HYPERCHOLESTEROLEMIA: ICD-10-CM

## 2023-08-02 RX ORDER — LEVOTHYROXINE SODIUM 112 UG/1
TABLET ORAL
Qty: 108 TABLET | Refills: 0 | Status: SHIPPED | OUTPATIENT
Start: 2023-08-02

## 2023-08-06 LAB
ALBUMIN SERPL-MCNC: 4.6 G/DL (ref 3.8–4.9)
ALBUMIN/CREAT UR: 24 MG/G CREAT (ref 0–29)
ALBUMIN/GLOB SERPL: 2.3 {RATIO} (ref 1.2–2.2)
ALP SERPL-CCNC: 83 IU/L (ref 44–121)
ALT SERPL-CCNC: 19 IU/L (ref 0–32)
AST SERPL-CCNC: 23 IU/L (ref 0–40)
BILIRUB SERPL-MCNC: 0.9 MG/DL (ref 0–1.2)
BUN SERPL-MCNC: 17 MG/DL (ref 6–24)
BUN/CREAT SERPL: 23 (ref 9–23)
CALCIUM SERPL-MCNC: 9.8 MG/DL (ref 8.7–10.2)
CHLORIDE SERPL-SCNC: 104 MMOL/L (ref 96–106)
CHOLEST SERPL-MCNC: 132 MG/DL (ref 100–199)
CO2 SERPL-SCNC: 23 MMOL/L (ref 20–29)
CREAT SERPL-MCNC: 0.75 MG/DL (ref 0.57–1)
CREAT UR-MCNC: 146.1 MG/DL
EGFRCR SERPLBLD CKD-EPI 2021: 95 ML/MIN/1.73
GLOBULIN SER CALC-MCNC: 2 G/DL (ref 1.5–4.5)
GLUCOSE SERPL-MCNC: 124 MG/DL (ref 70–99)
HBA1C MFR BLD: 6.4 % (ref 4.8–5.6)
HDLC SERPL-MCNC: 37 MG/DL
LDLC SERPL CALC-MCNC: 69 MG/DL (ref 0–99)
LDLC/HDLC SERPL: 1.9 RATIO (ref 0–3.2)
MICROALBUMIN UR-MCNC: 35 UG/ML
POTASSIUM SERPL-SCNC: 4.4 MMOL/L (ref 3.5–5.2)
PROT SERPL-MCNC: 6.6 G/DL (ref 6–8.5)
SODIUM SERPL-SCNC: 142 MMOL/L (ref 134–144)
TRIGL SERPL-MCNC: 150 MG/DL (ref 0–149)
VLDLC SERPL CALC-MCNC: 26 MG/DL (ref 5–40)

## 2023-08-16 RX ORDER — GABAPENTIN 300 MG/1
CAPSULE ORAL
Qty: 180 CAPSULE | Refills: 0 | Status: SHIPPED | OUTPATIENT
Start: 2023-08-16

## 2023-08-23 ENCOUNTER — OFFICE VISIT (OUTPATIENT)
Dept: INTERNAL MEDICINE | Facility: CLINIC | Age: 53
End: 2023-08-23
Payer: COMMERCIAL

## 2023-08-23 VITALS
WEIGHT: 161 LBS | BODY MASS INDEX: 29.63 KG/M2 | HEIGHT: 62 IN | DIASTOLIC BLOOD PRESSURE: 60 MMHG | TEMPERATURE: 98.1 F | HEART RATE: 72 BPM | SYSTOLIC BLOOD PRESSURE: 100 MMHG | OXYGEN SATURATION: 98 %

## 2023-08-23 DIAGNOSIS — E11.29 CONTROLLED TYPE 2 DIABETES MELLITUS WITH MICROALBUMINURIA, WITHOUT LONG-TERM CURRENT USE OF INSULIN: Primary | ICD-10-CM

## 2023-08-23 DIAGNOSIS — R80.9 CONTROLLED TYPE 2 DIABETES MELLITUS WITH MICROALBUMINURIA, WITHOUT LONG-TERM CURRENT USE OF INSULIN: Primary | ICD-10-CM

## 2023-08-23 DIAGNOSIS — F41.8 MIXED ANXIETY DEPRESSIVE DISORDER: ICD-10-CM

## 2023-08-23 DIAGNOSIS — E03.9 ACQUIRED HYPOTHYROIDISM: ICD-10-CM

## 2023-08-23 DIAGNOSIS — M51.36 DDD (DEGENERATIVE DISC DISEASE), LUMBAR: ICD-10-CM

## 2023-08-23 DIAGNOSIS — E78.00 HYPERCHOLESTEROLEMIA: ICD-10-CM

## 2023-08-23 PROCEDURE — 99214 OFFICE O/P EST MOD 30 MIN: CPT | Performed by: FAMILY MEDICINE

## 2023-08-23 NOTE — PROGRESS NOTES
Subjective   Anu Adam is a 53 y.o. female.   Chief Complaint   Patient presents with    Hyperlipidemia    Diabetes    Depression    degenerative disc       History of Present Illness     1. Diabetes type 2-diagnosed in 8/2020. She is on Metformin extended release at 1000 mg a day.  She takes it every day. She has no side effects.  No diarrhea. She had diarrhea on Metformin  immediately release.  She lost 5 pounds from last office visit by increasing physical activity and eating healthier.  She tries to get 6000- 7000 steps a day.  She does some weights.  Sugars at home are in 120s.  , A1c at 6.4 from 7.0 from 7.1 from 6.5 from 6.6.  Microalbumin 35.0. She had eye exam 6/2022.  She is not scheduled yet.  She had Pneumovax in August 2020.     2.  Hyperlipidemia-patient is on Crestor at 10 mg a day.  She takes it every day.  She has no side effects.  LDL 69 , HDL 37.  AST 23 from 45, ALT 13 from 40.  She stopped using Tylenol..    3.  Lumbar degenerative disc disease-patient had microdiscectomy at L3-L4 in February 2020 by Dr. Mcgraw.  She was started on gabapentin at 300 mg twice a day after surgery.  It controled her symptoms.  When she saw Dr. Mcgraw last time, they advised her to try weaning off.  She tried to do it as advised, but after 1 to 2 weeks of decreasing dose from twice a day to once a day she started having right leg pain.  It was the same as prior to initiation of therapy.  So she increased it back to 300 mg twice a day as advised by Dr. Ramos.  All symptoms resolved after she did it.  It was suggested by Dr. Mcgraw that will take over prescribing it.     No change from last office visit.  Gabapentin helps.  She has no side effects.  No drowsiness.     4. Depression with anxiety- on sertraline at 100 mg a day.  She takes it every day.  She has no side effects.  No suicidal ideations, no aggressive behaviors. Mood is normal most of the time.  No excessive worries. PHQ-9 is  at 2, GAY-7 at 1.    Review of Systems   Respiratory: Negative.     Cardiovascular: Negative.    Psychiatric/Behavioral: Negative.  Negative for suicidal ideas.        Objective   Wt Readings from Last 3 Encounters:   08/23/23 73 kg (161 lb)   05/04/23 75.3 kg (166 lb)   01/13/23 75.3 kg (166 lb)      Vitals:    08/23/23 0713   BP: 100/60   Pulse: 72   Temp: 98.1 øF (36.7 øC)   SpO2: 98%     Temp Readings from Last 3 Encounters:   08/23/23 98.1 øF (36.7 øC)   05/04/23 97.6 øF (36.4 øC)   01/13/23 97.7 øF (36.5 øC)     BP Readings from Last 3 Encounters:   08/23/23 100/60   05/04/23 120/70   01/13/23 122/60     Pulse Readings from Last 3 Encounters:   08/23/23 72   05/04/23 78   01/13/23 89     Body mass index is 29.44 kg/mý.    Physical Exam  Constitutional:       Appearance: She is well-developed.   Neck:      Thyroid: No thyromegaly.      Vascular: No carotid bruit.   Cardiovascular:      Rate and Rhythm: Normal rate and regular rhythm.      Heart sounds: Normal heart sounds.   Pulmonary:      Effort: Pulmonary effort is normal.      Breath sounds: Normal breath sounds.   Skin:     General: Skin is warm and dry.   Neurological:      Mental Status: She is alert.   Psychiatric:         Behavior: Behavior normal.       Assessment & Plan   Diagnoses and all orders for this visit:    1. Controlled type 2 diabetes mellitus with microalbuminuria, without long-term current use of insulin (Primary)  -     Hemoglobin A1c; Future  -     Comprehensive Metabolic Panel; Future    2. Mixed anxiety depressive disorder    3. DDD (degenerative disc disease), lumbar    4. Hypercholesterolemia  -     Comprehensive Metabolic Panel; Future    5. Acquired hypothyroidism  -     Thyroid Cascade Profile; Future        1.  Diabetes type 2-good job with weight loss.  Continue to work on it.  Try to add cardio exercise.  Continue current treatment.  Schedule eye exam.  Follow-up in 6 months.  Labs before office visit.  2.   Hyperlipidemia-continue current treatment.  Follow-up in 6 months.  3.  Depression anxiety-continue current treatment.  Follow-up in 6 months.  4.  Degenerative disc disease-continue current treatment.  Follow-up in 6 months.

## 2023-08-28 RX ORDER — SERTRALINE HYDROCHLORIDE 100 MG/1
100 TABLET, FILM COATED ORAL DAILY
Qty: 90 TABLET | Refills: 1 | Status: SHIPPED | OUTPATIENT
Start: 2023-08-28

## 2023-10-16 RX ORDER — ROSUVASTATIN CALCIUM 10 MG/1
10 TABLET, COATED ORAL EVERY EVENING
Qty: 90 TABLET | Refills: 0 | Status: SHIPPED | OUTPATIENT
Start: 2023-10-16

## 2023-10-16 RX ORDER — METFORMIN HYDROCHLORIDE 500 MG/1
1000 TABLET, EXTENDED RELEASE ORAL
Qty: 180 TABLET | Refills: 0 | Status: SHIPPED | OUTPATIENT
Start: 2023-10-16

## 2023-10-31 NOTE — PROGRESS NOTES
Subjective   Anu Adam is a 51 y.o. female.     Chief Complaint   Patient presents with   • Earache     Megan ear pain    • Sinusitis        History of Present Illness   She is here today as a new patient to me with c/o maxillary sinus pressure and ear pain in the left ear. The ear pain has persisted for over a month. This has become worse with some radiation into the jaw. She has noticed sinus pressure starting this week. She has been using ibuprofen and tylenol and ice. She has used the tizanadine with some improvement. She is using her NSS one puff each nostril daily.    She has a history of TMJ and jaw clenching. She usually wears a bite guard.   She is scheduled to see her dentist on Monday.   She denies any sick contacts, recent travel or antibiotic use or tooth pain.    The following portions of the patient's history were reviewed and updated as appropriate: allergies, current medications, past family history, past medical history, past social history, past surgical history and problem list.    Review of Systems   Constitutional: Negative for chills, fatigue and fever.   HENT: Positive for congestion, ear pain (left ear pain) and sinus pressure. Negative for dental problem, postnasal drip and rhinorrhea.    Respiratory: Negative for cough, chest tightness, shortness of breath and wheezing.    Cardiovascular: Negative for chest pain, palpitations and leg swelling.   Gastrointestinal: Negative for nausea and vomiting.   Neurological: Positive for headache.       Objective   Physical Exam  Constitutional:       General: She is awake.      Appearance: Normal appearance. She is well-developed.   HENT:      Head: Normocephalic and atraumatic.      Jaw: Tenderness (left side) and pain on movement present.      Right Ear: Hearing, ear canal and external ear normal. A middle ear effusion is present.      Left Ear: Hearing, ear canal and external ear normal. A middle ear effusion is present.      Nose: Septal  deviation present.      Right Sinus: No maxillary sinus tenderness or frontal sinus tenderness.      Left Sinus: No maxillary sinus tenderness or frontal sinus tenderness.      Mouth/Throat:      Lips: Pink.      Mouth: Mucous membranes are moist. No injury or oral lesions.      Dentition: Normal dentition. No dental tenderness, gingival swelling or dental abscesses.      Tongue: No lesions. Tongue does not deviate from midline.      Palate: No mass and lesions.      Pharynx: Oropharynx is clear. Uvula midline.   Neck:      Thyroid: No thyroid mass, thyromegaly or thyroid tenderness.      Vascular: No carotid bruit.      Trachea: Trachea normal.   Cardiovascular:      Rate and Rhythm: Normal rate and regular rhythm.      Chest Wall: PMI is not displaced.      Pulses:           Radial pulses are 2+ on the right side and 2+ on the left side.        Dorsalis pedis pulses are 2+ on the right side and 2+ on the left side.        Posterior tibial pulses are 2+ on the right side and 2+ on the left side.      Heart sounds: S1 normal and S2 normal.   Pulmonary:      Effort: Pulmonary effort is normal.      Breath sounds: Normal breath sounds.   Musculoskeletal:      Right lower leg: No edema.      Left lower leg: No edema.   Lymphadenopathy:      Head:      Right side of head: No submental, submandibular, tonsillar or occipital adenopathy.      Left side of head: No submental, submandibular, tonsillar or occipital adenopathy.      Cervical: No cervical adenopathy.   Skin:     General: Skin is warm and dry.      Capillary Refill: Capillary refill takes less than 2 seconds.      Nails: There is no clubbing.   Neurological:      Mental Status: She is alert and oriented to person, place, and time.   Psychiatric:         Attention and Perception: Attention normal.         Mood and Affect: Mood and affect normal.         Speech: Speech normal.         Behavior: Behavior normal. Behavior is cooperative.         Thought Content:  Thought content normal.         Cognition and Memory: Cognition normal.         Vitals:    10/15/21 1345   BP: 124/62   Pulse: 67   Temp: 98.7 °F (37.1 °C)   SpO2: 98%      Body mass index is 29.99 kg/m².    Assessment/Plan   Diagnoses and all orders for this visit:    1. TMJ tenderness, left (Primary)    2. Chronic non-seasonal allergic rhinitis      1. TMJ- recommend tylenol/ibuprofen and tizanidine at bedtime. Avoid excessive chewing, soft foods, application of heat or ice. Hydrate well with fluids. Wear bite guard as prescribed. F/u with dentist as scheduled on Monday.  2. Chronic AR- recommend increasing to flonase 2 puffs each nostril daily. If symptoms persist or worsen she will notify me.       31-Oct-2023 22:26

## 2023-11-14 ENCOUNTER — OFFICE VISIT (OUTPATIENT)
Dept: INTERNAL MEDICINE | Facility: CLINIC | Age: 53
End: 2023-11-14
Payer: COMMERCIAL

## 2023-11-14 VITALS
HEIGHT: 62 IN | BODY MASS INDEX: 29.59 KG/M2 | HEART RATE: 95 BPM | OXYGEN SATURATION: 99 % | TEMPERATURE: 98.2 F | DIASTOLIC BLOOD PRESSURE: 64 MMHG | WEIGHT: 160.8 LBS | SYSTOLIC BLOOD PRESSURE: 100 MMHG

## 2023-11-14 DIAGNOSIS — N30.01 ACUTE CYSTITIS WITH HEMATURIA: Primary | ICD-10-CM

## 2023-11-14 DIAGNOSIS — R30.0 DYSURIA: ICD-10-CM

## 2023-11-14 LAB
BILIRUB BLD-MCNC: ABNORMAL MG/DL
CLARITY, POC: ABNORMAL
COLOR UR: ABNORMAL
EXPIRATION DATE: ABNORMAL
GLUCOSE UR STRIP-MCNC: ABNORMAL MG/DL
KETONES UR QL: NEGATIVE
LEUKOCYTE EST, POC: ABNORMAL
Lab: ABNORMAL
NITRITE UR-MCNC: POSITIVE MG/ML
PH UR: 5 [PH] (ref 5–8)
PROT UR STRIP-MCNC: ABNORMAL MG/DL
RBC # UR STRIP: NEGATIVE /UL
SP GR UR: 1.03 (ref 1–1.03)
UROBILINOGEN UR QL: NORMAL

## 2023-11-14 PROCEDURE — 99213 OFFICE O/P EST LOW 20 MIN: CPT | Performed by: FAMILY MEDICINE

## 2023-11-14 PROCEDURE — 81003 URINALYSIS AUTO W/O SCOPE: CPT | Performed by: FAMILY MEDICINE

## 2023-11-14 RX ORDER — NITROFURANTOIN 25; 75 MG/1; MG/1
100 CAPSULE ORAL 2 TIMES DAILY
Qty: 10 CAPSULE | Refills: 0 | Status: SHIPPED | OUTPATIENT
Start: 2023-11-14

## 2023-11-14 NOTE — TELEPHONE ENCOUNTER
"2022    CC: Hypertension (F/U---no other issues)  .        HPI  History of Present Illness     Subjective   Ana Lowery is a 76 y.o. female.      The following portions of the patient's history were reviewed and updated as appropriate: allergies, current medications, past family history, past medical history, past social history, past surgical history and problem list.    Problem List  Patient Active Problem List   Diagnosis   • Atypical chest pain   • Colon cancer screening   • Essential hypertension   • GERD (gastroesophageal reflux disease)   • History of parathyroidectomy (HCC)   • Hypokalemia   • Meningioma (HCC)   • Mixed hyperlipidemia   • Postmenopausal osteoporosis       Past Medical History  Past Medical History:   Diagnosis Date   • Hypertension    • Hypothyroidism        Surgical History  Past Surgical History:   Procedure Laterality Date   • CHOLECYSTECTOMY     • HERNIA REPAIR     • THYROID SURGERY         Family History  History reviewed. No pertinent family history.    Social History  Social History    Tobacco Use      Smoking status: Former        Packs/day: 0.50        Years: 10.00        Pack years: 5        Types: Cigarettes        Quit date:         Years since quittin.0      Smokeless tobacco: Never      Tobacco comments: unsure of how long she smoked total       Is the Patient a current tobacco user? No    Allergies  Allergies   Allergen Reactions   • Ondansetron Swelling   • Buspirone Other (See Comments)     \"Patient reports she didn't feel right.\"   • Doxepin Other (See Comments)     \"Chest Tightness.\"   • Morphine Itching       Current Medications    Current Outpatient Medications:   •  alendronate (FOSAMAX) 35 MG tablet, TAKE 1 TABLET EVERY 7 DAYS, Disp: 12 tablet, Rfl: 1  •  amLODIPine (NORVASC) 5 MG tablet, Take 1 tablet by mouth Daily., Disp: 90 tablet, Rfl: 1  •  atorvastatin (LIPITOR) 40 MG tablet, Take 1 tablet by mouth Every Night., Disp: 90 tablet, Rfl: 1  •  " CSA not on file  Rodríguez on desk to be signed  Last OV 11/14/23  Next OV 2/23/24  Last fill date 8/16/23 Cholecalciferol (Vitamin D-3) 25 MCG (1000 UT) capsule, Take 2,000 Units by mouth., Disp: , Rfl:   •  diclofenac (VOLTAREN) 75 MG EC tablet, Take 1 tablet by mouth 2 (Two) Times a Day., Disp: 180 tablet, Rfl: 0  •  famotidine (PEPCID) 20 MG tablet, Take 20 mg by mouth 2 (Two) Times a Day As Needed for Heartburn., Disp: , Rfl:   •  potassium chloride (MICRO-K) 10 MEQ CR capsule, Take 10 mEq by mouth 2 (Two) Times a Day., Disp: , Rfl:   •  simethicone (MYLICON) 125 MG chewable tablet, Chew 125 mg Every 6 (Six) Hours As Needed for Flatulence., Disp: , Rfl:   •  lisinopril (PRINIVIL,ZESTRIL) 10 MG tablet, Take 1 tablet by mouth Daily., Disp: 30 tablet, Rfl: 3  •  LORazepam (Ativan) 0.5 MG tablet, Take 1/2 tablet p.o. nightly as needed for sleep, Disp: 5 tablet, Rfl: 0  •  Synthroid 25 MCG tablet, TAKE 1 TABLET DAILY, Disp: 30 tablet, Rfl: 1     Review of System  Review of Systems  I have reviewed and confirmed the accuracy of the ROS as documented by the MA/LPN/RN Jose Chacko MD    Vitals:    12/09/22 0757   BP: 140/64   Resp: 16     Body mass index is 22.51 kg/m².    Objective     Physical Exam  Physical Exam    Assessment & Plan      This pleasant 75-year-old    Patient presents today for follow-up of hypertension and hypothyroidism.  She relates she is feeling well although she continues to have problems with poor sleep.  She relates that she is just been a little anxious she has had lots of other things on her mind and she has not been able to get good sleep over the past several days.  In the interim of visit she was seen by the neurologist for her meningioma and she relates that she was told that she did not have to follow-up for another 2 years.    We reviewed with her her MRI that we had done which showed no increase in size of the meningioma.  We also reviewed her TSH done back on 9/2 which was within normal limits at 3.150.  Previous level was 5.5 elevated.  She currently is on Synthroid 25 mcg 1 tab  p.o. daily.  Review of her comprehensive metabolic profile also done at that time was normal.    Patient's blood pressure initially was 140/64 on recheck by me in the left arm sitting position standard cuff was 138/80.  She currently is on amlodipine 5 mg p.o. daily.    We discussed with her the importance of continuing a low-sodium diet and continuing to take her medication.  Diagnoses and all orders for this visit:    1. Need for influenza vaccination (Primary)  -     Fluzone High-Dose 65+yrs  -     LORazepam (Ativan) 0.5 MG tablet; Take 1/2 tablet p.o. nightly as needed for sleep  Dispense: 5 tablet; Refill: 0    2. Hypothyroidism, unspecified type    3. Primary hypertension    Other orders  -     lisinopril (PRINIVIL,ZESTRIL) 10 MG tablet; Take 1 tablet by mouth Daily.  Dispense: 30 tablet; Refill: 3      Plan:  1.)  Lorazepam 0.5 mg one half tab p.o. nightly x5 days then as needed.  2.)  Add lisinopril 10 mg 1 tab p.o. every morning to her current amlodipine 5 mg.  3.)  Follow-up in 1 to 2 months for recheck of blood pressure.       Jose Chacko MD  12/09/2022

## 2023-11-14 NOTE — PROGRESS NOTES
Subjective   Anu Adam is a 53 y.o. female.   Chief Complaint   Patient presents with    Difficulty Urinating     Difficulty urinating, pelvic discomfort/pressure - Urinary symptoms started Friday night - Patient took AZO last night    Urinary Urgency       History of Present Illness     Urinary urgency and frequency-symptoms started 5 days ago.  No burning sensation with urination.  No fever, no flank pain.  She had diarrhea for 2 days prior to onset of urinary urgency and frequency.  No blood seen in urine.  She used Azo yesterday and it helped a lot.    Review of Systems   Constitutional:  Negative for fever.   Genitourinary:  Positive for frequency and urgency. Negative for hematuria.         Objective   Wt Readings from Last 3 Encounters:   11/14/23 72.9 kg (160 lb 12.8 oz)   08/23/23 73 kg (161 lb)   05/04/23 75.3 kg (166 lb)      Vitals:    11/14/23 1528   BP: 100/64   Pulse: 95   Temp: 98.2 °F (36.8 °C)   SpO2: 99%     Temp Readings from Last 3 Encounters:   11/14/23 98.2 °F (36.8 °C)   08/23/23 98.1 °F (36.7 °C)   05/04/23 97.6 °F (36.4 °C)     BP Readings from Last 3 Encounters:   11/14/23 100/64   08/23/23 100/60   05/04/23 120/70     Pulse Readings from Last 3 Encounters:   11/14/23 95   08/23/23 72   05/04/23 78     Body mass index is 29.4 kg/m².    Physical Exam  Abdominal:      General: There is no distension.      Palpations: Abdomen is soft. There is no mass.      Tenderness: There is no right CVA tenderness or left CVA tenderness.      Comments: Mild suprapubic TTP.         Assessment & Plan   Diagnoses and all orders for this visit:    1. Dysuria (Primary)  -     POCT urinalysis dipstick, automated  -     Urine Culture - Urine, Urine, Clean Catch    2. Acute cystitis with hematuria    Other orders  -     nitrofurantoin, macrocrystal-monohydrate, (Macrobid) 100 MG capsule; Take 1 capsule by mouth 2 (Two) Times a Day.  Dispense: 10 capsule; Refill: 0        UTI-urine dip positive for trace of  leukocytes and nitrates.  No blood.  We are sending it for culture.  Discussed with patient empirical treatment with Macrobid versus waiting for culture results.  Patient prefers to start treatment.  She is advised to increase fluids.  Follow-up as needed.                 Topical Clindamycin Counseling: Patient counseled that this medication may cause skin irritation or allergic reactions.  In the event of skin irritation, the patient was advised to reduce the amount of the drug applied or use it less frequently.   The patient verbalized understanding of the proper use and possible adverse effects of clindamycin.  All of the patient's questions and concerns were addressed.

## 2023-11-15 RX ORDER — GABAPENTIN 300 MG/1
CAPSULE ORAL
Qty: 180 CAPSULE | Refills: 0 | Status: SHIPPED | OUTPATIENT
Start: 2023-11-15

## 2023-11-16 LAB
BACTERIA UR CULT: NORMAL
BACTERIA UR CULT: NORMAL

## 2023-12-04 ENCOUNTER — HOSPITAL ENCOUNTER (EMERGENCY)
Facility: HOSPITAL | Age: 53
Discharge: HOME | End: 2023-12-04
Attending: EMERGENCY MEDICINE | Admitting: EMERGENCY MEDICINE
Payer: COMMERCIAL

## 2023-12-04 VITALS
HEIGHT: 62 IN | OXYGEN SATURATION: 99 % | WEIGHT: 160 LBS | TEMPERATURE: 98.7 F | DIASTOLIC BLOOD PRESSURE: 66 MMHG | BODY MASS INDEX: 29.44 KG/M2 | RESPIRATION RATE: 16 BRPM | SYSTOLIC BLOOD PRESSURE: 145 MMHG | HEART RATE: 101 BPM

## 2023-12-04 DIAGNOSIS — N21.1 URETHRAL STONE: Primary | ICD-10-CM

## 2023-12-04 DIAGNOSIS — R30.0 DYSURIA: ICD-10-CM

## 2023-12-04 LAB

## 2023-12-04 PROCEDURE — 99283 EMERGENCY DEPT VISIT LOW MDM: CPT

## 2023-12-04 PROCEDURE — 88300 SURGICAL PATH GROSS: CPT | Performed by: EMERGENCY MEDICINE

## 2023-12-04 PROCEDURE — 81001 URINALYSIS AUTO W/SCOPE: CPT | Performed by: EMERGENCY MEDICINE

## 2023-12-04 RX ORDER — LIDOCAINE HYDROCHLORIDE 20 MG/ML
5 SOLUTION OROPHARYNGEAL ONCE
Status: COMPLETED | OUTPATIENT
Start: 2023-12-04 | End: 2023-12-04

## 2023-12-04 RX ADMIN — LIDOCAINE HYDROCHLORIDE 5 ML: 20 SOLUTION OROPHARYNGEAL at 19:52

## 2023-12-04 NOTE — ED PROVIDER NOTES
EMERGENCY DEPARTMENT ENCOUNTER    Room Number:  09/09  PCP: Geovanna Kinney MD  Patient Care Team:  Geovanna Kinney MD as PCP - General  Geovanna Kinney MD as PCP - Family Medicine   Independent Historians: Patient    HPI:  Chief Complaint: Dysuria kidney stone    A complete HPI/ROS/PMH/PSH/SH/FH are unobtainable due to: Nothing    Chronic or social conditions impacting patient care (Social Determinants of Health): None  (Financial Resource Strain / Food Insecurity / Transportation Needs / Physical Activity / Stress / Social Connections / Intimate Partner Violence / Housing Stability)    Context: Anu Adam is a 53 y.o. female who presents to the ED c/o acute dysuria and reports she has a kidney stone.  The patient reports for the last 3 weeks to 4 weeks she has had some dysuria.  She reports she saw her primary care doctor and they obtained urinalysis.  He reports they started her on antibiotics back in November.  She states that today she noticed there was a kidney stone at her urethral meatus.  She states it is about the size of a pea.  She states she is unable to get it to pass.  She denies any abdominal pain.  She denies any nausea or vomiting.  She denies any fevers.      Review of prior external notes (non-ED) -and- Review of prior external test results outside of this encounter: Urine culture dated 11/14/2023 was negative.  Urinalysis on the same date showed positive leukocytes, positive nitrite.    Prescription drug monitoring program review:         PAST MEDICAL HISTORY  Active Ambulatory Problems     Diagnosis Date Noted    Mixed anxiety depressive disorder 02/03/2016    Hypercholesterolemia 02/03/2016    Hypothyroidism 02/03/2016    Chronic non-seasonal allergic rhinitis 10/17/2017    Family history of GI malignancy 01/27/2020    Acute right-sided low back pain with right-sided sciatica 02/20/2020    Surgical followup visit 05/11/2020    Back spasm 05/11/2020    Herniation of lumbar  intervertebral disc with radiculopathy 2020    DDD (degenerative disc disease), lumbar 2020    History of spinal surgery 2020    Bilateral nephrolithiasis 10/03/2020    Neuropathic pain of right lower extremity 2021    Controlled type 2 diabetes mellitus with microalbuminuria, without long-term current use of insulin 2022     Resolved Ambulatory Problems     Diagnosis Date Noted    Atopic rhinitis 2016    Hyperglycemia 2016     Past Medical History:   Diagnosis Date    Allergic     Anxiety     Depression     Diabetes mellitus     Elevated cholesterol     Hormone replacement therapy     PONV (postoperative nausea and vomiting)          PAST SURGICAL HISTORY  Past Surgical History:   Procedure Laterality Date    BACK SURGERY       SECTION      CHOLECYSTECTOMY      COLONOSCOPY N/A 2020    Procedure: COLONOSCOPY;  Surgeon: Emory Acosta MD;  Location: [facility] ENDOSCOPY;  Service: Gastroenterology;  Laterality: N/A;  PRE- SCREENING, FAMILY HX COLON CA  POST- NORMAL    LUMBAR DISCECTOMY Right 2020    Procedure: L3 & L4 MICRO DISCECTOMY WITH METRIX;  Surgeon: Edwin Mcgraw MD;  Location: [facility] MAIN OR;  Service: Neurosurgery;  Laterality: Right;    TONSILLECTOMY      TUBAL ABDOMINAL LIGATION      URETEROSCOPY LASER LITHOTRIPSY WITH STENT INSERTION Bilateral 10/4/2020    Procedure: URETEROSCOPY LASER LITHOTRIPSY WITH bilateral retrogrades, and bilateral STENT INSERTION;  Surgeon: Cm Bennett MD;  Location: [facility] OR;  Service: Urology;  Laterality: Bilateral;    US GUIDED CYST ASPIRATION BREAST N/A 3/23/2022         FAMILY HISTORY  Family History   Problem Relation Age of Onset    Heart disease Father     Diabetes Father     COPD Father     Hypertension Father     Cancer Mother     Heart disease Mother     Arthritis Mother     COPD Mother     Heart disease Brother     Lung cancer Brother     No Known Problems Sister     No Known Problems  Son     No Known Problems Daughter     No Known Problems Paternal Grandmother     No Known Problems Maternal Grandmother     No Known Problems Maternal Aunt     No Known Problems Paternal Aunt     BRCA 1/2 Neg Hx     Breast cancer Neg Hx     Colon cancer Neg Hx     Endometrial cancer Neg Hx     Ovarian cancer Neg Hx     Malig Hyperthermia Neg Hx          SOCIAL HISTORY  Social History     Socioeconomic History    Marital status: Single   Tobacco Use    Smoking status: Never    Smokeless tobacco: Never   Vaping Use    Vaping Use: Never used   Substance and Sexual Activity    Alcohol use: Yes     Comment: rarely on occas    Drug use: No    Sexual activity: Defer     Birth control/protection: Post-menopausal         ALLERGIES  Adhesive tape and Chlorhexidine        REVIEW OF SYSTEMS  Review of Systems  Included in HPI  All systems reviewed and negative except for those discussed in HPI.      PHYSICAL EXAM    I have reviewed the triage vital signs and nursing notes.    ED Triage Vitals   Temp Heart Rate Resp BP SpO2   12/04/23 1750 12/04/23 1750 12/04/23 1804 12/04/23 1804 12/04/23 1750   98.7 °F (37.1 °C) 101 16 145/66 99 %      Temp src Heart Rate Source Patient Position BP Location FiO2 (%)   -- -- 12/04/23 1804 12/04/23 1804 --     Lying Left arm        Physical Exam  GENERAL: Awake, alert, no acute distress  SKIN: Warm, dry  HENT: Normocephalic, atraumatic  EYES: no scleral icterus  CV: regular rhythm, regular rate  RESPIRATORY: normal effort, lungs clear  ABDOMEN: soft, nontender, nondistended  MUSCULOSKELETAL: no deformity  NEURO: alert, moves all extremities, follows commands                                                               LAB RESULTS  Recent Results (from the past 24 hour(s))   Urinalysis With Microscopic If Indicated (No Culture) - Urine, Clean Catch    Collection Time: 12/04/23  6:20 PM    Specimen: Urine, Clean Catch   Result Value Ref Range    Color, UA Yellow Yellow, Straw    Appearance, UA  Clear Clear    pH, UA 6.5 5.0 - 8.0    Specific Gravity, UA 1.017 1.005 - 1.030    Glucose, UA Negative Negative    Ketones, UA Negative Negative    Bilirubin, UA Negative Negative    Blood, UA Trace (A) Negative    Protein, UA Negative Negative    Leuk Esterase, UA Small (1+) (A) Negative    Nitrite, UA Negative Negative    Urobilinogen, UA 1.0 E.U./dL 0.2 - 1.0 E.U./dL   Urinalysis, Microscopic Only - Urine, Clean Catch    Collection Time: 12/04/23  6:20 PM    Specimen: Urine, Clean Catch   Result Value Ref Range    RBC, UA 3-5 (A) None Seen, 0-2 /HPF    WBC, UA 3-5 (A) None Seen, 0-2 /HPF    Bacteria, UA Trace (A) None Seen /HPF    Squamous Epithelial Cells, UA 0-2 None Seen, 0-2 /HPF    Hyaline Casts, UA None Seen None Seen /LPF    Methodology Manual Light Microscopy        I ordered the above labs and independently reviewed the results.        RADIOLOGY  No Radiology Exams Resulted Within Past 24 Hours    I ordered the above noted radiological studies. Reviewed by me and discussed with radiologist.  See dictation for official radiology interpretation.      PROCEDURES    Procedures      MEDICATIONS GIVEN IN ER    Medications   Lidocaine Viscous HCl (XYLOCAINE) 2 % solution 5 mL (has no administration in time range)         ORDERS PLACED DURING THIS VISIT:  Orders Placed This Encounter   Procedures    Urinalysis With Microscopic If Indicated (No Culture) - Urine, Clean Catch    Urinalysis, Microscopic Only - Urine, Clean Catch    Supplies To Bedside - Notify MD When Ready- Pelvic Cart / Set Up    Specimen To Pathology         PROGRESS, DATA ANALYSIS, CONSULTS, AND MEDICAL DECISION MAKING    All labs have been independently interpreted by me.  All radiology studies have been reviewed by me and discussed with radiologist dictating the report.   EKG's independently viewed and interpreted by me.  Discussion below represents my analysis of pertinent findings related to patient's condition, differential diagnosis,  treatment plan and final disposition.    Differential diagnosis includes but is not limited to kidney stone, UTI, hematuria.    Clinical Scores:              ED Course as of 12/04/23 1944   Mon Dec 04, 2023   1806 Plan to obtain urinalysis and then apply viscous lidocaine to her ureteral meatus for pain control and form a bedside examination and remove the stone if it is present. [TR]   1943 Urethral foreign body removed with forceps.  The patient tolerated the procedure well.  Viscous lidocaine was used with topical application prior to removal.  Single stone removed and sent to pathology. [TR]      ED Course User Index  [TR] Hung Clarke MD                   AS OF 19:44 EST VITALS:    BP - 145/66  HR - 101  TEMP - 98.7 °F (37.1 °C)  O2 SATS - 99%        DIAGNOSIS  Final diagnoses:   Urethral stone   Dysuria         DISPOSITION  ED Disposition       ED Disposition   Discharge    Condition   Stable    Comment   --                  Note Disclaimer: At [facility], we believe that sharing information builds trust and better relationships. You are receiving this note because you recently visited [facility]. It is possible you will see health information before a provider has talked with you about it. This kind of information can be easy to misunderstand. To help you fully understand what it means for your health, we urge you to discuss this note with your provider.         Hung Clarke MD  12/04/23 1944

## 2023-12-05 LAB
LAB AP CASE REPORT: NORMAL
PATH REPORT.FINAL DX SPEC: NORMAL
PATH REPORT.GROSS SPEC: NORMAL

## 2023-12-05 NOTE — DISCHARGE INSTRUCTIONS
Drink plenty of water to stay hydrated.  Return immediately for any fever over 100.4, severe abdominal pain.

## 2023-12-15 RX ORDER — LEVOTHYROXINE SODIUM 112 UG/1
TABLET ORAL
Qty: 108 TABLET | Refills: 0 | Status: SHIPPED | OUTPATIENT
Start: 2023-12-15

## 2023-12-22 RX ORDER — SERTRALINE HYDROCHLORIDE 100 MG/1
100 TABLET, FILM COATED ORAL DAILY
Qty: 90 TABLET | Refills: 1 | Status: SHIPPED | OUTPATIENT
Start: 2023-12-22

## 2023-12-22 RX ORDER — LISINOPRIL 2.5 MG/1
2.5 TABLET ORAL DAILY
Qty: 90 TABLET | Refills: 1 | Status: SHIPPED | OUTPATIENT
Start: 2023-12-22

## 2023-12-22 RX ORDER — LEVOTHYROXINE SODIUM 112 UG/1
TABLET ORAL
Qty: 108 TABLET | Refills: 0 | Status: SHIPPED | OUTPATIENT
Start: 2023-12-22

## 2023-12-22 RX ORDER — ROSUVASTATIN CALCIUM 10 MG/1
10 TABLET, COATED ORAL EVERY EVENING
Qty: 90 TABLET | Refills: 0 | Status: SHIPPED | OUTPATIENT
Start: 2023-12-22

## 2023-12-22 RX ORDER — METFORMIN HYDROCHLORIDE 500 MG/1
1000 TABLET, EXTENDED RELEASE ORAL
Qty: 180 TABLET | Refills: 0 | Status: SHIPPED | OUTPATIENT
Start: 2023-12-22

## 2023-12-24 RX ORDER — ESTRADIOL 1 MG/1
1 TABLET ORAL DAILY
Qty: 90 TABLET | Refills: 3 | Status: SHIPPED | OUTPATIENT
Start: 2023-12-24

## 2023-12-24 RX ORDER — MEDROXYPROGESTERONE ACETATE 2.5 MG/1
2.5 TABLET ORAL DAILY
Qty: 90 TABLET | Refills: 3 | Status: SHIPPED | OUTPATIENT
Start: 2023-12-24

## 2023-12-29 ENCOUNTER — HOSPITAL ENCOUNTER (OUTPATIENT)
Facility: HOSPITAL | Age: 53
Discharge: HOME | End: 2023-12-29
Admitting: OBSTETRICS & GYNECOLOGY
Payer: COMMERCIAL

## 2023-12-29 ENCOUNTER — OFFICE VISIT (OUTPATIENT)
Dept: OBSTETRICS AND GYNECOLOGY | Age: 53
End: 2023-12-29
Payer: COMMERCIAL

## 2023-12-29 VITALS
SYSTOLIC BLOOD PRESSURE: 122 MMHG | WEIGHT: 158 LBS | DIASTOLIC BLOOD PRESSURE: 76 MMHG | HEIGHT: 62 IN | BODY MASS INDEX: 29.08 KG/M2

## 2023-12-29 DIAGNOSIS — Z12.31 VISIT FOR SCREENING MAMMOGRAM: ICD-10-CM

## 2023-12-29 DIAGNOSIS — Z01.419 WELL WOMAN EXAM WITH ROUTINE GYNECOLOGICAL EXAM: Primary | ICD-10-CM

## 2023-12-29 DIAGNOSIS — R10.2 LEFT ADNEXAL TENDERNESS: ICD-10-CM

## 2023-12-29 DIAGNOSIS — Z79.890 HORMONE REPLACEMENT THERAPY (HRT): ICD-10-CM

## 2023-12-29 PROCEDURE — 77063 BREAST TOMOSYNTHESIS BI: CPT

## 2023-12-29 PROCEDURE — 77067 SCR MAMMO BI INCL CAD: CPT

## 2023-12-29 RX ORDER — MEDROXYPROGESTERONE ACETATE 2.5 MG/1
2.5 TABLET ORAL DAILY
Qty: 90 TABLET | Refills: 3 | Status: SHIPPED | OUTPATIENT
Start: 2023-12-29

## 2023-12-29 RX ORDER — ESTRADIOL 1 MG/1
1 TABLET ORAL DAILY
Qty: 90 TABLET | Refills: 3 | Status: SHIPPED | OUTPATIENT
Start: 2023-12-29

## 2023-12-29 NOTE — PROGRESS NOTES
Subjective     Chief Complaint   Patient presents with    Gynecologic Exam     AE, last pap 2022 neg/hpv neg, mg 2023, csy 2020  Cc:  had bouts with kidney stones       History of Present Illness    Anu Adam is a 53 y.o.  who presents for annual exam.    Doing well  Mammogram today  Colonoscopy UTD - due , will need new referral  She is postmenopausal - on HRT, wishes to continue. No bleeding.  Had recent kidney stones  No other GYN concerns or complaints  PCP - Dr Kinney     Obstetric History:  OB History          1    Para   1    Term   1            AB        Living   1         SAB        IAB        Ectopic        Molar        Multiple        Live Births   1               Menstrual History:     Patient's last menstrual period was 2013 (lmp unknown).         Current contraception: post menopausal status  History of abnormal Pap smear: no  Received Gardasil immunization: no  Perform regular self breast exam: yes - .  Family history of uterine or ovarian cancer: no  Family History of colon cancer: no  Family history of breast cancer: no    Mammogram: done today.  Colonoscopy: up to date.  DEXA: not indicated.    Exercise: moderately active  Calcium/Vitamin D: uses supplements    The following portions of the patient's history were reviewed and updated as appropriate: allergies, current medications, past family history, past medical history, past social history, past surgical history, and problem list.    Review of Systems   Constitutional: Negative.    Respiratory: Negative.     Cardiovascular: Negative.    Gastrointestinal: Negative.    Genitourinary: Negative.    Skin: Negative.    Psychiatric/Behavioral: Negative.             Objective   Physical Exam  Constitutional:       General: She is awake.      Appearance: Normal appearance. She is well-developed.   HENT:      Head: Normocephalic and atraumatic.      Nose: Nose normal.   Neck:      Thyroid: No thyroid  mass, thyromegaly or thyroid tenderness.   Cardiovascular:      Rate and Rhythm: Normal rate and regular rhythm.      Pulses: Normal pulses.      Heart sounds: Normal heart sounds.   Pulmonary:      Effort: Pulmonary effort is normal.      Breath sounds: Normal breath sounds.   Chest:   Breasts:     Breasts are symmetrical.      Right: Normal. No swelling, bleeding, inverted nipple, mass, nipple discharge, skin change or tenderness.      Left: Normal. No swelling, bleeding, inverted nipple, mass, nipple discharge, skin change or tenderness.   Abdominal:      General: Abdomen is flat. Bowel sounds are normal.      Palpations: Abdomen is soft.      Tenderness: There is no abdominal tenderness.   Genitourinary:     General: Normal vulva.      Labia:         Right: No rash, tenderness, lesion or injury.         Left: No rash, tenderness, lesion or injury.       Urethra: No prolapse, urethral pain, urethral swelling or urethral lesion.      Vagina: Normal. No signs of injury. No vaginal discharge, erythema, tenderness, bleeding, lesions or prolapsed vaginal walls.      Cervix: Normal. No discharge, friability, lesion, erythema or cervical bleeding.      Uterus: Normal. Not enlarged, not tender and no uterine prolapse.       Adnexa: Right adnexa normal and left adnexa normal.        Right: No mass, tenderness or fullness.          Left: Tenderness and fullness present. No mass.        Rectum: Normal. No mass.   Musculoskeletal:      Cervical back: Normal range of motion and neck supple.   Lymphadenopathy:      Upper Body:      Right upper body: No supraclavicular adenopathy.      Left upper body: No supraclavicular adenopathy.   Skin:     General: Skin is warm and dry.   Neurological:      General: No focal deficit present.      Mental Status: She is alert and oriented to person, place, and time.   Psychiatric:         Mood and Affect: Mood normal.         Behavior: Behavior normal. Behavior is cooperative.          "Thought Content: Thought content normal.         Judgment: Judgment normal.         /76   Ht 157.5 cm (62\")   Wt 71.7 kg (158 lb)   LMP 11/16/2013 (LMP Unknown) Comment: patient states her last period was six years ago and is post menopausal   BMI 28.90 kg/m²     Assessment & Plan   Diagnoses and all orders for this visit:    1. Well woman exam with routine gynecological exam (Primary)    2. Left adnexal tenderness    3. Visit for screening mammogram  -     Mammo Screening Digital Tomosynthesis Bilateral With CAD; Future    4. Hormone replacement therapy (HRT)    Other orders  -     estradiol (ESTRACE) 1 MG tablet; Take 1 tablet by mouth Daily.  Dispense: 90 tablet; Refill: 3  -     medroxyPROGESTERone (PROVERA) 2.5 MG tablet; Take 1 tablet by mouth Daily.  Dispense: 90 tablet; Refill: 3        All questions answered.  Breast self exam technique reviewed and patient encouraged to perform self-exam monthly.  Discussed healthy lifestyle modifications.  Recommended 30 minutes of aerobic exercise five times per week.  Discussed calcium needs to prevent osteoporosis.    -Pap UTD  -Mammogram today  -F/u 1-2 weeks for US to eval uterus/ovaries due to pain with bimanual exam                "

## 2024-01-05 DIAGNOSIS — N63.20 MASS OF LEFT BREAST, UNSPECIFIED QUADRANT: ICD-10-CM

## 2024-01-05 DIAGNOSIS — R92.8 ABNORMALITY OF LEFT BREAST ON SCREENING MAMMOGRAM: Primary | ICD-10-CM

## 2024-01-19 RX ORDER — LISINOPRIL 2.5 MG/1
2.5 TABLET ORAL DAILY
Qty: 90 TABLET | Refills: 1 | Status: SHIPPED | OUTPATIENT
Start: 2024-01-19

## 2024-01-22 RX ORDER — ESTRADIOL 1 MG/1
1 TABLET ORAL DAILY
Qty: 90 TABLET | Refills: 3 | Status: CANCELLED | OUTPATIENT
Start: 2024-01-22

## 2024-01-23 RX ORDER — ESTRADIOL 1 MG/1
1 TABLET ORAL DAILY
Qty: 90 TABLET | Refills: 3 | Status: SHIPPED | OUTPATIENT
Start: 2024-01-23

## 2024-01-25 RX ORDER — ROSUVASTATIN CALCIUM 10 MG/1
10 TABLET, COATED ORAL EVERY EVENING
Qty: 90 TABLET | Refills: 0 | OUTPATIENT
Start: 2024-01-25

## 2024-01-26 ENCOUNTER — OFFICE VISIT (OUTPATIENT)
Dept: INTERNAL MEDICINE | Facility: CLINIC | Age: 54
End: 2024-01-26
Payer: COMMERCIAL

## 2024-01-26 ENCOUNTER — HOSPITAL ENCOUNTER (OUTPATIENT)
Dept: GENERAL RADIOLOGY | Facility: HOSPITAL | Age: 54
Discharge: HOME | End: 2024-01-26
Admitting: FAMILY MEDICINE
Payer: COMMERCIAL

## 2024-01-26 VITALS
OXYGEN SATURATION: 98 % | TEMPERATURE: 98.2 F | HEIGHT: 62 IN | WEIGHT: 154.5 LBS | SYSTOLIC BLOOD PRESSURE: 104 MMHG | HEART RATE: 81 BPM | BODY MASS INDEX: 28.43 KG/M2 | DIASTOLIC BLOOD PRESSURE: 68 MMHG

## 2024-01-26 DIAGNOSIS — R25.2 MUSCLE CRAMPS: ICD-10-CM

## 2024-01-26 DIAGNOSIS — R05.9 COUGH, UNSPECIFIED TYPE: Primary | ICD-10-CM

## 2024-01-26 PROCEDURE — 71046 X-RAY EXAM CHEST 2 VIEWS: CPT

## 2024-01-26 PROCEDURE — 99213 OFFICE O/P EST LOW 20 MIN: CPT | Performed by: FAMILY MEDICINE

## 2024-01-26 NOTE — PROGRESS NOTES
Subjective   Anu Adam is a 53 y.o. female.   Chief Complaint   Patient presents with    Cough     Lingering cough after having Covid 4 weeks ago    Fatigue    Leg Problem     Cramping in both legs at night       History of Present Illness     Cough -patient had COVID infection 4 weeks ago.  She had cough, scratchy throat, fever, chills and headache.  She got better, but she continues to have cough and chest congestion.  Her cough is dry.  She is not able to cough anything up.  She has no shortness of breath or wheezing.  No night cough.  She uses Delsym for night cough only and ibuprofen.    Cramps -she has leg cramps on and off, but they got worse after she had COVID infection.  She is trying to drink more water.    Review of Systems   Constitutional:  Negative for fever.   Respiratory:  Positive for cough. Negative for shortness of breath and wheezing.    Cardiovascular: Negative.          Objective   Wt Readings from Last 3 Encounters:   01/26/24 70.1 kg (154 lb 8 oz)   12/29/23 71.7 kg (158 lb)   12/04/23 72.6 kg (160 lb)      Vitals:    01/26/24 1335   BP: 104/68   Pulse: 81   Temp: 98.2 °F (36.8 °C)   SpO2: 98%     Temp Readings from Last 3 Encounters:   01/26/24 98.2 °F (36.8 °C)   12/04/23 98.7 °F (37.1 °C)   11/14/23 98.2 °F (36.8 °C)     BP Readings from Last 3 Encounters:   01/26/24 104/68   12/29/23 122/76   12/04/23 145/66     Pulse Readings from Last 3 Encounters:   01/26/24 81   12/04/23 101   11/14/23 95     Body mass index is 28.25 kg/m².    Physical Exam  Constitutional:       Appearance: She is well-developed.   HENT:      Right Ear: Tympanic membrane, ear canal and external ear normal.      Left Ear: Tympanic membrane, ear canal and external ear normal.   Cardiovascular:      Rate and Rhythm: Normal rate and regular rhythm.      Heart sounds: Normal heart sounds.   Pulmonary:      Effort: Pulmonary effort is normal. No respiratory distress.      Breath sounds: Normal breath sounds. No  wheezing, rhonchi or rales.   Skin:     General: Skin is warm and dry.   Neurological:      Mental Status: She is alert.         Assessment & Plan   Diagnoses and all orders for this visit:    1. Cough, unspecified type (Primary)  -     XR Chest PA & Lateral    2. Muscle cramps        Cough-we are checking chest x-ray.  She is advised to start Mucinex 1200 mg twice a day.  Stay well-hydrated.  Use Delsym for night cough only.  Follow-up as needed.    Muscle cramps-increase hydration, start stretching at bedtime.  Start co-Q10.  Return to clinic if symptoms are not back to baseline with treatment.

## 2024-01-29 DIAGNOSIS — R93.89 ABNORMAL CHEST X-RAY: Primary | ICD-10-CM

## 2024-02-06 ENCOUNTER — TELEPHONE (OUTPATIENT)
Dept: OBSTETRICS AND GYNECOLOGY | Age: 54
End: 2024-02-06
Payer: COMMERCIAL

## 2024-02-06 RX ORDER — ESTRADIOL 1 MG/1
1 TABLET ORAL DAILY
Qty: 30 TABLET | Refills: 8 | Status: SHIPPED | OUTPATIENT
Start: 2024-02-06 | End: 2024-11-02

## 2024-02-09 DIAGNOSIS — E03.9 ACQUIRED HYPOTHYROIDISM: ICD-10-CM

## 2024-02-09 DIAGNOSIS — E11.29 CONTROLLED TYPE 2 DIABETES MELLITUS WITH MICROALBUMINURIA, WITHOUT LONG-TERM CURRENT USE OF INSULIN: ICD-10-CM

## 2024-02-09 DIAGNOSIS — R80.9 CONTROLLED TYPE 2 DIABETES MELLITUS WITH MICROALBUMINURIA, WITHOUT LONG-TERM CURRENT USE OF INSULIN: ICD-10-CM

## 2024-02-09 DIAGNOSIS — E78.00 HYPERCHOLESTEROLEMIA: ICD-10-CM

## 2024-02-16 RX ORDER — GABAPENTIN 300 MG/1
300 CAPSULE ORAL 2 TIMES DAILY
Qty: 180 CAPSULE | Refills: 0 | Status: SHIPPED | OUTPATIENT
Start: 2024-02-16

## 2024-02-28 RX ORDER — SERTRALINE HYDROCHLORIDE 100 MG/1
100 TABLET, FILM COATED ORAL DAILY
Qty: 90 TABLET | Refills: 1 | Status: SHIPPED | OUTPATIENT
Start: 2024-02-28

## 2024-03-17 LAB
ALBUMIN SERPL-MCNC: 4.6 G/DL (ref 3.8–4.9)
ALBUMIN/GLOB SERPL: 2.4 {RATIO} (ref 1.2–2.2)
ALP SERPL-CCNC: 78 IU/L (ref 44–121)
ALT SERPL-CCNC: 23 IU/L (ref 0–32)
AST SERPL-CCNC: 20 IU/L (ref 0–40)
BILIRUB SERPL-MCNC: 1.3 MG/DL (ref 0–1.2)
BUN SERPL-MCNC: 12 MG/DL (ref 6–24)
BUN/CREAT SERPL: 16 (ref 9–23)
CALCIUM SERPL-MCNC: 9.8 MG/DL (ref 8.7–10.2)
CHLORIDE SERPL-SCNC: 104 MMOL/L (ref 96–106)
CO2 SERPL-SCNC: 24 MMOL/L (ref 20–29)
CREAT SERPL-MCNC: 0.74 MG/DL (ref 0.57–1)
EGFRCR SERPLBLD CKD-EPI 2021: 96 ML/MIN/1.73
GLOBULIN SER CALC-MCNC: 1.9 G/DL (ref 1.5–4.5)
GLUCOSE SERPL-MCNC: 108 MG/DL (ref 70–99)
HBA1C MFR BLD: 6 % (ref 4.8–5.6)
POTASSIUM SERPL-SCNC: 4.5 MMOL/L (ref 3.5–5.2)
PROT SERPL-MCNC: 6.5 G/DL (ref 6–8.5)
SODIUM SERPL-SCNC: 143 MMOL/L (ref 134–144)
T4 FREE SERPL-MCNC: 2.38 NG/DL (ref 0.82–1.77)
TSH SERPL DL<=0.005 MIU/L-ACNC: 0.04 UIU/ML (ref 0.45–4.5)

## 2024-03-25 RX ORDER — LEVOTHYROXINE SODIUM 112 UG/1
TABLET ORAL
Qty: 108 TABLET | Refills: 0 | Status: SHIPPED | OUTPATIENT
Start: 2024-03-25

## 2024-04-03 RX ORDER — GABAPENTIN 300 MG/1
300 CAPSULE ORAL 2 TIMES DAILY
Qty: 180 CAPSULE | Refills: 0 | OUTPATIENT
Start: 2024-04-03

## 2024-04-03 RX ORDER — METFORMIN HYDROCHLORIDE 500 MG/1
1000 TABLET, EXTENDED RELEASE ORAL
Qty: 180 TABLET | Refills: 0 | Status: SHIPPED | OUTPATIENT
Start: 2024-04-03

## 2024-04-03 RX ORDER — ROSUVASTATIN CALCIUM 10 MG/1
10 TABLET, COATED ORAL EVERY EVENING
Qty: 90 TABLET | Refills: 0 | Status: SHIPPED | OUTPATIENT
Start: 2024-04-03

## 2024-04-09 RX ORDER — SERTRALINE HYDROCHLORIDE 100 MG/1
100 TABLET, FILM COATED ORAL DAILY
Qty: 90 TABLET | Refills: 1 | Status: SHIPPED | OUTPATIENT
Start: 2024-04-09

## 2024-04-09 RX ORDER — LISINOPRIL 2.5 MG/1
2.5 TABLET ORAL DAILY
Qty: 90 TABLET | Refills: 1 | Status: SHIPPED | OUTPATIENT
Start: 2024-04-09

## 2024-04-10 ENCOUNTER — TELEPHONE (OUTPATIENT)
Dept: INTERNAL MEDICINE | Facility: CLINIC | Age: 54
End: 2024-04-10
Payer: COMMERCIAL

## 2024-04-10 RX ORDER — LEVOTHYROXINE SODIUM 112 UG/1
TABLET ORAL
Qty: 108 TABLET | Refills: 0 | Status: SHIPPED | OUTPATIENT
Start: 2024-04-10

## 2024-04-10 NOTE — TELEPHONE ENCOUNTER
[facility] Mail order called. Anu's Levothyroxine should have been sent to mail order for [facility]. Please resend her medication to [facility]

## 2024-04-22 ENCOUNTER — OFFICE VISIT (OUTPATIENT)
Dept: INTERNAL MEDICINE | Facility: CLINIC | Age: 54
End: 2024-04-22
Payer: COMMERCIAL

## 2024-04-22 VITALS
SYSTOLIC BLOOD PRESSURE: 114 MMHG | HEIGHT: 62 IN | BODY MASS INDEX: 28.32 KG/M2 | DIASTOLIC BLOOD PRESSURE: 78 MMHG | HEART RATE: 77 BPM | TEMPERATURE: 98.2 F | OXYGEN SATURATION: 99 % | WEIGHT: 153.9 LBS

## 2024-04-22 DIAGNOSIS — E78.00 HYPERCHOLESTEROLEMIA: ICD-10-CM

## 2024-04-22 DIAGNOSIS — R80.9 CONTROLLED TYPE 2 DIABETES MELLITUS WITH MICROALBUMINURIA, WITHOUT LONG-TERM CURRENT USE OF INSULIN: ICD-10-CM

## 2024-04-22 DIAGNOSIS — E11.29 CONTROLLED TYPE 2 DIABETES MELLITUS WITH MICROALBUMINURIA, WITHOUT LONG-TERM CURRENT USE OF INSULIN: ICD-10-CM

## 2024-04-22 DIAGNOSIS — Z00.00 PHYSICAL EXAM, ANNUAL: Primary | ICD-10-CM

## 2024-04-22 DIAGNOSIS — F41.8 MIXED ANXIETY DEPRESSIVE DISORDER: ICD-10-CM

## 2024-04-22 DIAGNOSIS — E03.9 ACQUIRED HYPOTHYROIDISM: ICD-10-CM

## 2024-04-22 DIAGNOSIS — M51.36 DDD (DEGENERATIVE DISC DISEASE), LUMBAR: ICD-10-CM

## 2024-04-22 PROCEDURE — 99214 OFFICE O/P EST MOD 30 MIN: CPT | Performed by: FAMILY MEDICINE

## 2024-04-22 PROCEDURE — 99396 PREV VISIT EST AGE 40-64: CPT | Performed by: FAMILY MEDICINE

## 2024-04-22 RX ORDER — METFORMIN HYDROCHLORIDE 500 MG/1
1000 TABLET, EXTENDED RELEASE ORAL
Qty: 180 TABLET | Refills: 1 | Status: SHIPPED | OUTPATIENT
Start: 2024-04-22

## 2024-04-22 RX ORDER — LEVOTHYROXINE SODIUM 112 UG/1
TABLET ORAL
Qty: 90 TABLET | Refills: 1 | Status: SHIPPED | OUTPATIENT
Start: 2024-04-22

## 2024-04-22 RX ORDER — ROSUVASTATIN CALCIUM 10 MG/1
10 TABLET, COATED ORAL EVERY EVENING
Qty: 90 TABLET | Refills: 1 | Status: SHIPPED | OUTPATIENT
Start: 2024-04-22

## 2024-04-22 RX ORDER — GABAPENTIN 300 MG/1
300 CAPSULE ORAL 2 TIMES DAILY
Qty: 180 CAPSULE | Refills: 1 | Status: SHIPPED | OUTPATIENT
Start: 2024-04-22

## 2024-04-22 NOTE — PROGRESS NOTES
Subjective   Anu Adam is a 54 y.o. female.   Chief Complaint   Patient presents with    Annual Exam    Hypertension    Hyperlipidemia    Hypothyroidism    Anxiety    Depression    Diabetes     Patient is here for complete physical exam and to follow-up on diabetes, hyperlipidemia, depression/anxiety, degenerative disc disease.    CPE    There is no change in medical or surgical history from last office visit.  No ER visits or hospitalizations.  No change in family history.  No change in prescription medications.  Over-the-counter that she added turmeric, otherwise no change in OTC medications.  No new allergies to medications.  She does not use tobacco products, she drinks few drinks a year.  No drugs.  She improved her diet.  She increased physical activity.  She goes for walks 5 times a week for 1 to 2 miles.  She does yard work.  She has dental appointments every 6 months.  Last eye exam was in December 2023.  GYN evaluation in December 2023.  Patient reports she is up-to-date with Pap smear and mammogram.    She had colonoscopy in July 2020.  Next was recommended 5 years year later due to family history of colon cancer in her mother.  She had tetanus vaccine 2018, Shingrix x 2, pneumonia v. x 2.  She thinks that it is possible that she had hepatitis B vaccine.  She worked for [employer].  She was going to check her records.      Diabetes type 2-diagnosed in 8/2020. She is on Metformin extended release at 1000 mg a day.  She takes it every day. She has no side effects.  No diarrhea. She had diarrhea on Metformin  immediately release.  She lost 7 pounds from December 2023 by increasing physical activity and eating healthier.  Sugars at home are in 120s.  , A1c at 6.0 from 6.4 from 7.0. She had eye exam 12/02023.   She had Pneumovax in August 2020.     Hyperlipidemia- patient is on Crestor at 10 mg a day.  She takes it every day.  She has no side effects.  Normal transaminases.     Lumbar degenerative  disc disease-patient had microdiscectomy at L3-L4 in February 2020 by Dr. Mcgraw.  She was started on gabapentin at 300 mg twice a day after surgery.  It controled her symptoms.  When she saw Dr. Mcgraw last time, they advised her to try weaning off.  She tried to do it as advised, but after 1 to 2 weeks of decreasing dose from twice a day to once a day she started having right leg pain.  It was the same as prior to initiation of therapy.  So she increased it back to 300 mg twice a day as advised by Dr. Ramos.  All symptoms resolved after she did it.  It was suggested by Dr. Mcgraw that will take over prescribing it.     No change from last office visit.  Gabapentin helps.  She has no side effects.  No drowsiness.     Depression with anxiety- on sertraline at 100 mg a day.  She takes it every day.  She has no side effects.  No suicidal ideations, no aggressive behaviors. Mood is normal most of the time.  No excessive worries. PHQ-9 is at 2, GAY-7 at 4.    Hypothyroidism-patient takes levothyroxine 112 mcg.  She takes 1 tablet every day.      Review of Systems   Constitutional:  Negative for chills and fever.   Respiratory:  Negative for shortness of breath.    Cardiovascular:  Negative for chest pain and palpitations.   Gastrointestinal:  Negative for blood in stool.   Genitourinary:  Negative for hematuria.   Neurological:  Negative for light-headedness.   Psychiatric/Behavioral: Negative.  Negative for suicidal ideas.          Objective   Wt Readings from Last 3 Encounters:   04/22/24 69.8 kg (153 lb 14.4 oz)   01/26/24 70.1 kg (154 lb 8 oz)   12/29/23 71.7 kg (158 lb)      Vitals:    04/22/24 0710   BP: 114/78   Pulse: 77   Temp: 98.2 °F (36.8 °C)   SpO2: 99%     Temp Readings from Last 3 Encounters:   04/22/24 98.2 °F (36.8 °C) (Oral)   01/26/24 98.2 °F (36.8 °C)   12/04/23 98.7 °F (37.1 °C)     BP Readings from Last 3 Encounters:   04/22/24 114/78   01/26/24 104/68   12/29/23 122/76     Pulse  Readings from Last 3 Encounters:   04/22/24 77   01/26/24 81   12/04/23 101     Body mass index is 28.14 kg/m².    Physical Exam  Vitals reviewed.   Constitutional:       General: She is not in acute distress.     Appearance: She is well-developed. She is not diaphoretic.   HENT:      Head: Normocephalic and atraumatic. Hair is normal.      Right Ear: Hearing, tympanic membrane, ear canal and external ear normal. No decreased hearing noted. No drainage.      Left Ear: Hearing, tympanic membrane, ear canal and external ear normal. No decreased hearing noted.      Nose: No nasal deformity.   Eyes:      General: Lids are normal.         Right eye: No discharge.         Left eye: No discharge.      Conjunctiva/sclera: Conjunctivae normal.      Pupils: Pupils are equal, round, and reactive to light.   Neck:      Thyroid: No thyromegaly.      Vascular: No JVD.      Trachea: No tracheal deviation.   Cardiovascular:      Rate and Rhythm: Normal rate and regular rhythm.      Pulses: Normal pulses.      Heart sounds: Normal heart sounds. No murmur heard.     No friction rub. No gallop.   Pulmonary:      Effort: Pulmonary effort is normal. No respiratory distress.      Breath sounds: Normal breath sounds. No wheezing or rales.   Chest:      Chest wall: No tenderness.   Abdominal:      General: There is no distension.      Palpations: Abdomen is soft. There is no mass.      Tenderness: There is no abdominal tenderness. There is no guarding or rebound.      Hernia: No hernia is present.   Musculoskeletal:         General: No tenderness or deformity. Normal range of motion.      Cervical back: Normal range of motion.   Lymphadenopathy:      Cervical: No cervical adenopathy.      Upper Body:      Right upper body: No supraclavicular adenopathy.      Left upper body: No supraclavicular adenopathy.   Skin:     General: Skin is warm and dry.      Findings: No erythema or rash.   Neurological:      Mental Status: She is alert and  oriented to person, place, and time.      Cranial Nerves: No cranial nerve deficit.      Motor: No abnormal muscle tone.      Coordination: Coordination normal.      Deep Tendon Reflexes: Reflexes are normal and symmetric. Reflexes normal.   Psychiatric:         Behavior: Behavior normal.         Thought Content: Thought content normal.         Judgment: Judgment normal.         Assessment & Plan   Diagnoses and all orders for this visit:    1. Physical exam, annual (Primary)    2. Controlled type 2 diabetes mellitus with microalbuminuria, without long-term current use of insulin    3. Hypercholesterolemia    4. Acquired hypothyroidism  -     Thyroid Cascade Profile; Future    5. Mixed anxiety depressive disorder    6. DDD (degenerative disc disease), lumbar    Other orders  -     levothyroxine (SYNTHROID, LEVOTHROID) 112 MCG tablet; TAKE ONE TABLET BY MOUTH DAILY EXCEPT SUNDAY when YOU TAKE  HALF TABLET  Dispense: 90 tablet; Refill: 1  -     metFORMIN ER (GLUCOPHAGE-XR) 500 MG 24 hr tablet; Take 2 tablets by mouth Daily With Breakfast.  Dispense: 180 tablet; Refill: 1  -     rosuvastatin (CRESTOR) 10 MG tablet; Take 1 tablet by mouth Every Evening.  Dispense: 90 tablet; Refill: 1  -     gabapentin (NEURONTIN) 300 MG capsule; Take 1 capsule by mouth 2 (Two) Times a Day.  Dispense: 180 capsule; Refill: 1        CPE-preventive counseling provided.  Good job with weight loss.  Continue to work on it.  Exercise at least 30 minutes a day, 5 days a week.  Continue regular dental appointments at least every 6-months.  Patient is up-to-date with cancer screening.  We discussed recommendations for vaccinations including COVID-vaccine and hepatitis B vaccine.  Blood work results reviewed with patient.    Diabetes type 2-continue current treatment.  Follow-up in 6 months.    Hyperlipidemia-continue current treatment.  Follow-up in 6 months.  Labs before office visit.    Depression anxiety-continue current treatment.  Follow-up  in 6 months.    Hypothyroidism-uncontrolled.  We are decreasing dose of levothyroxine 112.  Patient will take 1 tablet 6 days a week, half tablet on Sundays.  Labs in 3 months.  She is advised to check her supplements and if she takes anything with biotin she should hold it for 1 week prior to labs.  Follow-up in 6 months.

## 2024-05-09 ENCOUNTER — TELEPHONE (OUTPATIENT)
Dept: OBSTETRICS AND GYNECOLOGY | Age: 54
End: 2024-05-09
Payer: COMMERCIAL

## 2024-05-09 ENCOUNTER — OFFICE VISIT (OUTPATIENT)
Dept: OBSTETRICS AND GYNECOLOGY | Age: 54
End: 2024-05-09
Payer: COMMERCIAL

## 2024-05-09 VITALS
HEIGHT: 62 IN | SYSTOLIC BLOOD PRESSURE: 112 MMHG | DIASTOLIC BLOOD PRESSURE: 68 MMHG | WEIGHT: 153 LBS | BODY MASS INDEX: 28.16 KG/M2

## 2024-05-09 DIAGNOSIS — N95.0 POST-MENOPAUSAL BLEEDING: Primary | ICD-10-CM

## 2024-05-13 DIAGNOSIS — N63.20 MASS OF LEFT BREAST, UNSPECIFIED QUADRANT: Primary | ICD-10-CM

## 2024-05-13 PROBLEM — R92.8 ABNORMALITY OF LEFT BREAST ON SCREENING MAMMOGRAM: Status: ACTIVE | Noted: 2024-05-13

## 2024-05-13 PROCEDURE — 19084 BX BREAST ADD LESION US IMAG: CPT | Performed by: OBSTETRICS & GYNECOLOGY

## 2024-05-16 LAB
DX ICD CODE: NORMAL
PATH REPORT.FINAL DX SPEC: NORMAL
PATH REPORT.GROSS SPEC: NORMAL
PATH REPORT.RELEVANT HX SPEC: NORMAL
PATH REPORT.SITE OF ORIGIN SPEC: NORMAL
PATHOLOGIST NAME: NORMAL
PAYMENT PROCEDURE: NORMAL

## 2024-05-17 ENCOUNTER — TELEPHONE (OUTPATIENT)
Dept: OBSTETRICS AND GYNECOLOGY | Age: 54
End: 2024-05-17
Payer: COMMERCIAL

## 2024-05-17 NOTE — TELEPHONE ENCOUNTER
Hub staff attempted to follow warm transfer process and was unsuccessful     Caller: Anu Adam    Relationship to patient: Self    Best call back number: [phone] CALL ANYTIME, IT IS OKAY TO LVM.    Patient is needing: PATIENT RETURNED CALL, HUB UNABLE TO WARM TRANSFER.

## 2024-05-20 NOTE — PROGRESS NOTES
Please check on this biopsy report, should be coming from [facility] and care everywhere is not set up on her chart.  Just concerned to get it asap.

## 2024-05-21 ENCOUNTER — TELEPHONE (OUTPATIENT)
Dept: OBSTETRICS AND GYNECOLOGY | Age: 54
End: 2024-05-21
Payer: COMMERCIAL

## 2024-05-21 NOTE — TELEPHONE ENCOUNTER
5/21/2024 Called pt back to let her know biopsy results not back yet but I will call [facility] tomorrow to check on them and give her a call back.

## 2024-05-23 ENCOUNTER — TELEPHONE (OUTPATIENT)
Dept: OBSTETRICS AND GYNECOLOGY | Age: 54
End: 2024-05-23
Payer: COMMERCIAL

## 2024-05-23 NOTE — TELEPHONE ENCOUNTER
Spoke with pt confirming she has results of biopsies,   She is scheduled with oncology and breast surgery.

## 2024-07-10 RX ORDER — METFORMIN HYDROCHLORIDE 500 MG/1
TABLET, EXTENDED RELEASE ORAL
Qty: 180 TABLET | Refills: 1 | Status: SHIPPED | OUTPATIENT
Start: 2024-07-10

## 2024-07-10 RX ORDER — ROSUVASTATIN CALCIUM 10 MG/1
10 TABLET, COATED ORAL EVERY EVENING
Qty: 90 TABLET | Refills: 1 | Status: SHIPPED | OUTPATIENT
Start: 2024-07-10

## 2024-08-02 ENCOUNTER — TELEPHONE (OUTPATIENT)
Dept: INTERNAL MEDICINE | Facility: CLINIC | Age: 54
End: 2024-08-02
Payer: COMMERCIAL

## 2024-08-02 RX ORDER — METFORMIN HYDROCHLORIDE 500 MG/1
1500 TABLET, EXTENDED RELEASE ORAL
Qty: 90 TABLET | Refills: 0 | Status: SHIPPED | OUTPATIENT
Start: 2024-08-02

## 2024-08-02 NOTE — TELEPHONE ENCOUNTER
Patient had IV chemo today and they told her that her blood sugar was 400 and told her she needed to let her PCP know this. She takes Metformin and wants to know if she needs to adjust the dosage? Or what?    Phone: [phone]

## 2024-08-06 ENCOUNTER — OFFICE VISIT (OUTPATIENT)
Dept: INTERNAL MEDICINE | Facility: CLINIC | Age: 54
End: 2024-08-06
Payer: COMMERCIAL

## 2024-08-06 ENCOUNTER — TELEPHONE (OUTPATIENT)
Dept: INTERNAL MEDICINE | Facility: CLINIC | Age: 54
End: 2024-08-06

## 2024-08-06 ENCOUNTER — LAB (OUTPATIENT)
Dept: LAB | Facility: HOSPITAL | Age: 54
End: 2024-08-06
Payer: COMMERCIAL

## 2024-08-06 VITALS
BODY MASS INDEX: 26.5 KG/M2 | DIASTOLIC BLOOD PRESSURE: 68 MMHG | WEIGHT: 144 LBS | SYSTOLIC BLOOD PRESSURE: 84 MMHG | OXYGEN SATURATION: 96 % | HEART RATE: 112 BPM | HEIGHT: 62 IN

## 2024-08-06 DIAGNOSIS — R80.9 CONTROLLED TYPE 2 DIABETES MELLITUS WITH MICROALBUMINURIA, WITHOUT LONG-TERM CURRENT USE OF INSULIN: ICD-10-CM

## 2024-08-06 DIAGNOSIS — E11.29 CONTROLLED TYPE 2 DIABETES MELLITUS WITH MICROALBUMINURIA, WITHOUT LONG-TERM CURRENT USE OF INSULIN: ICD-10-CM

## 2024-08-06 DIAGNOSIS — E11.65 TYPE 2 DIABETES MELLITUS WITH HYPERGLYCEMIA, WITHOUT LONG-TERM CURRENT USE OF INSULIN: Primary | ICD-10-CM

## 2024-08-06 LAB
ALBUMIN SERPL-MCNC: 4.6 G/DL (ref 3.5–5.2)
ALBUMIN UR-MCNC: 7.1 MG/DL
ALBUMIN/GLOB SERPL: 1.7 G/DL
ALP SERPL-CCNC: 110 U/L (ref 39–117)
ALT SERPL W P-5'-P-CCNC: 15 U/L (ref 1–33)
ANION GAP SERPL CALCULATED.3IONS-SCNC: 14 MMOL/L (ref 5–15)
AST SERPL-CCNC: 12 U/L (ref 1–32)
BILIRUB SERPL-MCNC: 0.8 MG/DL (ref 0–1.2)
BUN SERPL-MCNC: 17 MG/DL (ref 6–20)
BUN/CREAT SERPL: 25 (ref 7–25)
CALCIUM SPEC-SCNC: 9.7 MG/DL (ref 8.6–10.5)
CHLORIDE SERPL-SCNC: 94 MMOL/L (ref 98–107)
CO2 SERPL-SCNC: 23 MMOL/L (ref 22–29)
CREAT SERPL-MCNC: 0.68 MG/DL (ref 0.57–1)
CREAT UR-MCNC: 123.8 MG/DL
EGFRCR SERPLBLD CKD-EPI 2021: 103.6 ML/MIN/1.73
GLOBULIN UR ELPH-MCNC: 2.7 GM/DL
GLUCOSE SERPL-MCNC: 431 MG/DL (ref 65–99)
HBA1C MFR BLD: 7.8 % (ref 4.8–5.6)
MICROALBUMIN/CREAT UR: 57.4 MG/G (ref 0–29)
POTASSIUM SERPL-SCNC: 4.6 MMOL/L (ref 3.5–5.2)
PROT SERPL-MCNC: 7.3 G/DL (ref 6–8.5)
SODIUM SERPL-SCNC: 131 MMOL/L (ref 136–145)

## 2024-08-06 PROCEDURE — 82043 UR ALBUMIN QUANTITATIVE: CPT | Performed by: NURSE PRACTITIONER

## 2024-08-06 PROCEDURE — 82570 ASSAY OF URINE CREATININE: CPT | Performed by: NURSE PRACTITIONER

## 2024-08-06 PROCEDURE — 83036 HEMOGLOBIN GLYCOSYLATED A1C: CPT | Performed by: NURSE PRACTITIONER

## 2024-08-06 PROCEDURE — 80053 COMPREHEN METABOLIC PANEL: CPT | Performed by: NURSE PRACTITIONER

## 2024-08-06 PROCEDURE — 99214 OFFICE O/P EST MOD 30 MIN: CPT | Performed by: NURSE PRACTITIONER

## 2024-08-06 PROCEDURE — 36415 COLL VENOUS BLD VENIPUNCTURE: CPT | Performed by: NURSE PRACTITIONER

## 2024-08-06 RX ORDER — PROMETHAZINE HYDROCHLORIDE 25 MG/1
12.5-25 TABLET ORAL EVERY 6 HOURS PRN
COMMUNITY
Start: 2024-05-24

## 2024-08-06 RX ORDER — SCOLOPAMINE TRANSDERMAL SYSTEM 1 MG/1
1 PATCH, EXTENDED RELEASE TRANSDERMAL
COMMUNITY
Start: 2024-07-02 | End: 2024-08-06

## 2024-08-06 RX ORDER — ONDANSETRON HYDROCHLORIDE 8 MG/1
8 TABLET, FILM COATED ORAL EVERY 8 HOURS PRN
COMMUNITY
Start: 2024-05-24

## 2024-08-06 RX ORDER — LIDOCAINE AND PRILOCAINE 25; 25 MG/G; MG/G
CREAM TOPICAL EVERY 24 HOURS
COMMUNITY
Start: 2024-05-24 | End: 2024-08-06

## 2024-08-06 RX ORDER — OLANZAPINE 2.5 MG/1
2.5 TABLET, FILM COATED ORAL DAILY
COMMUNITY
Start: 2024-07-23 | End: 2024-08-06

## 2024-08-06 RX ORDER — BLOOD SUGAR DIAGNOSTIC
STRIP MISCELLANEOUS
Qty: 50 EACH | Refills: 6 | Status: SHIPPED | OUTPATIENT
Start: 2024-08-06

## 2024-08-06 RX ORDER — TRAMADOL HYDROCHLORIDE 50 MG/1
50 TABLET ORAL EVERY 6 HOURS PRN
COMMUNITY
Start: 2024-06-05 | End: 2024-08-06

## 2024-08-06 NOTE — PROGRESS NOTES
Subjective   Anu Adam is a 54 y.o. female. Patient is here today for   Chief Complaint   Patient presents with    Diabetes     Patient had labs done in hospital, she was advised to see primary care due to fasting glucose being 180   .    History of Present Illness   Patient presents today with concerns about her blood sugar. On Friday, it was 440 at oncologist's office. She received insulin in the office. She reached out to her PCP and was instructed by another provider to increase her metformin to 1500 mg daily. Patient states that her glucose has not been below 300 all weekend. She is taking increased metformin to 1500 mg daily.     Diagnosed with breast cancer in May/[place]. She receives steroids each week with chemo.     The following portions of the patient's history were reviewed and updated as appropriate: allergies, current medications, past family history, past medical history, past social history, past surgical history and problem list.    Review of Systems    Objective   Vitals:    08/06/24 1144   BP: (!) 84/68   Pulse: 112   SpO2: 96%     Body mass index is 26.33 kg/m².  Physical Exam  Vitals and nursing note reviewed.   Constitutional:       Appearance: Normal appearance. She is well-developed.   Cardiovascular:      Rate and Rhythm: Normal rate and regular rhythm.      Heart sounds: Normal heart sounds.   Pulmonary:      Effort: Pulmonary effort is normal.      Breath sounds: Normal breath sounds.   Skin:     General: Skin is warm and dry.   Neurological:      Mental Status: She is alert and oriented to person, place, and time.   Psychiatric:         Speech: Speech normal.         Behavior: Behavior normal.         Thought Content: Thought content normal.         3/16/2024 Patient's HgbA1C was 6.0%    Assessment & Plan   Diagnoses and all orders for this visit:    1. Type 2 diabetes mellitus with hyperglycemia, without long-term current use of insulin (Primary)  -     Insulin Glargine (LANTUS  SOLOSTAR) 100 UNIT/ML injection pen; Inject 10 Units under the skin into the appropriate area as directed Every Night.  Dispense: 3 mL; Refill: 3  -     glucose blood (OneTouch Verio) test strip; Use as instructed to check glucose  Dispense: 50 each; Refill: 6    2. Controlled type 2 diabetes mellitus with microalbuminuria, without long-term current use of insulin  -     Microalbumin / Creatinine Urine Ratio - Urine, Clean Catch  -     Hemoglobin A1c  -     Comprehensive Metabolic Panel    DM -patient will be started on Lantus 10 units each night.  Follow-up with Dr. Kinney in 2 weeks.    Will check the above labs today.  Patient may need to see endocrinology      Addendum - Diagnosis - diabetes is NOT controlled at this time. Those labs orders were placed prior to appointment and diagnosis could not be changed

## 2024-08-07 DIAGNOSIS — R80.9 CONTROLLED TYPE 2 DIABETES MELLITUS WITH MICROALBUMINURIA, WITHOUT LONG-TERM CURRENT USE OF INSULIN: Primary | ICD-10-CM

## 2024-08-07 DIAGNOSIS — E11.29 CONTROLLED TYPE 2 DIABETES MELLITUS WITH MICROALBUMINURIA, WITHOUT LONG-TERM CURRENT USE OF INSULIN: Primary | ICD-10-CM

## 2024-08-07 RX ORDER — PEN NEEDLE, DIABETIC 30 GX3/16"
1 NEEDLE, DISPOSABLE MISCELLANEOUS DAILY
Qty: 100 EACH | Refills: 1 | Status: SHIPPED | OUTPATIENT
Start: 2024-08-07

## 2024-08-08 ENCOUNTER — LAB (OUTPATIENT)
Dept: LAB | Facility: HOSPITAL | Age: 54
End: 2024-08-08
Payer: COMMERCIAL

## 2024-08-08 ENCOUNTER — TELEPHONE (OUTPATIENT)
Dept: INTERNAL MEDICINE | Facility: CLINIC | Age: 54
End: 2024-08-08
Payer: COMMERCIAL

## 2024-08-08 DIAGNOSIS — E11.65 TYPE 2 DIABETES MELLITUS WITH HYPERGLYCEMIA, WITHOUT LONG-TERM CURRENT USE OF INSULIN: Primary | ICD-10-CM

## 2024-08-08 DIAGNOSIS — E11.65 TYPE 2 DIABETES MELLITUS WITH HYPERGLYCEMIA, WITHOUT LONG-TERM CURRENT USE OF INSULIN: ICD-10-CM

## 2024-08-08 LAB
ANION GAP SERPL CALCULATED.3IONS-SCNC: 12.6 MMOL/L (ref 5–15)
BACTERIA UR QL AUTO: ABNORMAL /HPF
BILIRUB UR QL STRIP: ABNORMAL
BUN SERPL-MCNC: 16 MG/DL (ref 6–20)
BUN/CREAT SERPL: 22.2 (ref 7–25)
CALCIUM SPEC-SCNC: 9.4 MG/DL (ref 8.6–10.5)
CHLORIDE SERPL-SCNC: 94 MMOL/L (ref 98–107)
CLARITY UR: CLEAR
CO2 SERPL-SCNC: 22.4 MMOL/L (ref 22–29)
COLOR UR: YELLOW
CREAT SERPL-MCNC: 0.72 MG/DL (ref 0.57–1)
EGFRCR SERPLBLD CKD-EPI 2021: 99.5 ML/MIN/1.73
GLUCOSE SERPL-MCNC: 370 MG/DL (ref 65–99)
GLUCOSE UR STRIP-MCNC: ABNORMAL MG/DL
HGB UR QL STRIP.AUTO: ABNORMAL
HOLD SPECIMEN: NORMAL
HYALINE CASTS UR QL AUTO: ABNORMAL /LPF
KETONES UR QL STRIP: ABNORMAL
LEUKOCYTE ESTERASE UR QL STRIP.AUTO: NEGATIVE
NITRITE UR QL STRIP: NEGATIVE
PH UR STRIP.AUTO: 5.5 [PH] (ref 5–8)
POTASSIUM SERPL-SCNC: 4.2 MMOL/L (ref 3.5–5.2)
PROT UR QL STRIP: ABNORMAL
RBC # UR STRIP: ABNORMAL /HPF
REF LAB TEST METHOD: ABNORMAL
SODIUM SERPL-SCNC: 129 MMOL/L (ref 136–145)
SP GR UR STRIP: 1.02 (ref 1–1.03)
SQUAMOUS #/AREA URNS HPF: ABNORMAL /HPF
UROBILINOGEN UR QL STRIP: ABNORMAL
WBC # UR STRIP: ABNORMAL /HPF

## 2024-08-08 PROCEDURE — 84443 ASSAY THYROID STIM HORMONE: CPT | Performed by: FAMILY MEDICINE

## 2024-08-08 PROCEDURE — 84439 ASSAY OF FREE THYROXINE: CPT | Performed by: FAMILY MEDICINE

## 2024-08-08 PROCEDURE — 86376 MICROSOMAL ANTIBODY EACH: CPT | Performed by: FAMILY MEDICINE

## 2024-08-08 PROCEDURE — 81001 URINALYSIS AUTO W/SCOPE: CPT | Performed by: FAMILY MEDICINE

## 2024-08-08 PROCEDURE — 80048 BASIC METABOLIC PNL TOTAL CA: CPT

## 2024-08-08 RX ORDER — BLOOD-GLUCOSE SENSOR
1 EACH MISCELLANEOUS
Qty: 2 EACH | Refills: 3 | Status: SHIPPED | OUTPATIENT
Start: 2024-08-08

## 2024-08-08 NOTE — TELEPHONE ENCOUNTER
Called pt at 7 it was 329 fasting  at 1:44 am it was 370  she will go downstairs to the [facility] for a bmp and ua and we will send in the jame 3

## 2024-08-09 ENCOUNTER — READMISSION MANAGEMENT (OUTPATIENT)
Dept: CALL CENTER | Facility: HOSPITAL | Age: 54
End: 2024-08-09
Payer: COMMERCIAL

## 2024-08-09 NOTE — OUTREACH NOTE
Prep Survey      Flowsheet Row Responses   [facility] facility patient discharged from? Non-[facility]   Is LACE score < 7 ? Non-BH Discharge   Eligibility [facility]   Date of Admission 08/08/24   Date of Discharge 08/09/24   Discharge Disposition Home or Self Care   Discharge diagnosis Hyperglycemia   Does the patient have one of the following disease processes/diagnoses(primary or secondary)? Other   Does the patient have Home health ordered? No   Is there a DME ordered? No   Prep survey completed? Yes            PERRI BLANC - Registered Nurse

## 2024-08-12 ENCOUNTER — TRANSITIONAL CARE MANAGEMENT TELEPHONE ENCOUNTER (OUTPATIENT)
Dept: CALL CENTER | Facility: HOSPITAL | Age: 54
End: 2024-08-12
Payer: COMMERCIAL

## 2024-08-12 NOTE — OUTREACH NOTE
Call Center TCM Note      Flowsheet Row Responses   [facility] patient discharged from? Non-   Does the patient have one of the following disease processes/diagnoses(primary or secondary)? Other   TCM attempt successful? Yes   Call start time 1009   Call end time 1014   Discharge diagnosis Hyperglycemia   Person spoke with today (if not patient) and relationship pt   Meds reviewed with patient/caregiver? Yes   Is the patient having any side effects they believe may be caused by any medication additions or changes? No   Does the patient have all medications ordered at discharge? Yes   Is the patient taking all medications as directed (includes completed medication regime)? Yes   Comments Endocrinology 8/19/24   Does the patient have an appointment with their PCP within 7-14 days of discharge? No  [OV 8/26/2024  7:15 AM]   Psychosocial issues? No   Did the patient receive a copy of their discharge instructions? Yes   Nursing interventions Reviewed instructions with patient   What is the patient's perception of their health status since discharge? Improving   Is the patient/caregiver able to teach back signs and symptoms related to disease process for when to call PCP? Yes   Is the patient/caregiver able to teach back signs and symptoms related to disease process for when to call 911? Yes   Is the patient/caregiver able to teach back the hierarchy of who to call/visit for symptoms/problems? PCP, Specialist, Home health nurse, Urgent Care, ED, 911 Yes   If the patient is a current smoker, are they able to teach back resources for cessation? Not a smoker   TCM call completed? Yes   Wrap up additional comments Pt reports . Reviewed AVS/meds with pt. Pt advised to call pharmacy for new Lantus rx as pt is out today. Pt veriifed Endocrinology appt.   Call end time 1014   Would this patient benefit from a Referral to [facility] Social Work? No   Is the patient interested in additional calls from an ambulatory case  [facility] Progress Note for 2020 10:02 AM    Patient Name:MARISA ORTEZ   Account #:[id number]  MRN:[id number]  Gender:Male  YOB: manager? No            Nelda Laughlin RN    8/12/2024, 10:16 EDT         1/10   worsened after pulmonary hemorrhage.  1/10 Javi Loving MD  .    Preparer:VIKTOR: ANDREINA Peralta, APRN 2020 10:02 AM      Attending: VIKTOR: Javi Loving MD 2020 9:49 PM

## 2024-08-14 RX ORDER — GABAPENTIN 300 MG/1
300 CAPSULE ORAL 2 TIMES DAILY
Qty: 180 CAPSULE | Refills: 1 | OUTPATIENT
Start: 2024-08-14

## 2024-08-19 ENCOUNTER — OFFICE VISIT (OUTPATIENT)
Dept: ENDOCRINOLOGY | Age: 54
End: 2024-08-19
Payer: COMMERCIAL

## 2024-08-19 VITALS
SYSTOLIC BLOOD PRESSURE: 110 MMHG | BODY MASS INDEX: 26.04 KG/M2 | HEART RATE: 95 BPM | DIASTOLIC BLOOD PRESSURE: 80 MMHG | OXYGEN SATURATION: 99 % | WEIGHT: 142.4 LBS | TEMPERATURE: 96.8 F

## 2024-08-19 DIAGNOSIS — E11.9 TYPE 2 DIABETES MELLITUS WITHOUT COMPLICATION, WITH LONG-TERM CURRENT USE OF INSULIN: Primary | ICD-10-CM

## 2024-08-19 DIAGNOSIS — E11.65 TYPE 2 DIABETES MELLITUS WITH HYPERGLYCEMIA, WITHOUT LONG-TERM CURRENT USE OF INSULIN: ICD-10-CM

## 2024-08-19 DIAGNOSIS — Z79.4 TYPE 2 DIABETES MELLITUS WITHOUT COMPLICATION, WITH LONG-TERM CURRENT USE OF INSULIN: Primary | ICD-10-CM

## 2024-08-19 PROCEDURE — 95251 CONT GLUC MNTR ANALYSIS I&R: CPT | Performed by: INTERNAL MEDICINE

## 2024-08-19 PROCEDURE — 99204 OFFICE O/P NEW MOD 45 MIN: CPT | Performed by: INTERNAL MEDICINE

## 2024-08-19 RX ORDER — INSULIN LISPRO 100 [IU]/ML
6-10 INJECTION, SOLUTION INTRAVENOUS; SUBCUTANEOUS 3 TIMES DAILY
COMMUNITY
Start: 2024-08-09 | End: 2024-09-28

## 2024-08-19 NOTE — PROGRESS NOTES
Chief complaint   Chief Complaint   Patient presents with    Diabetes     On Tatiana 3 sensor          Subjective     History of Present Illness:      This is a 54 year old female I am seeing for type 2 DM   referred by PCP     other medical history is   1- HTN   2- HLD   3- other   Pt had T2DM for many years   Current home medication for diabetes     Off Metfromin 500 mg three times a day   Lantus 30 units PM   Lispro based on SSI   the A1c was 8.1 in -  Checks blood sugar at home using CGM Tatiana 3   Checks blood sugar 4 times per day   SBM % in range and rest is high    pt states that she is having High BG after having started chemo for breast cancer now on chemo therapy then she will have surgery and radiation and there is No Hypoglycemic episodes  No AM Headaches /Nightmares  No Polyuria / Polydipsia  No Blurring of Vision  Last Dilated Pupil Exam, in  , no DM in the eye   NoTingling / numbness in feet  No Dizziness / lightheadedness  Dietary Compliance, no   Exercise , bike   Weight is going down   No Falls  No Nausea, vomiting / Diarrhea   Latest Reference Range & Units 24 00:44   Magnesium 1.6 - 2.6 mg/dL  1.6 - 2.6 mg/dL 1.4 (L) (E)  1.4 (L) (E)   Phosphorus 2.3 - 4.7 mg/dL  2.3 - 4.7 mg/dL 2.0 (L) (E)  1.9 (L) (E)   Hemoglobin A1C 4.3 - 5.6 % 8.1 (H) (E)   WBC 4.5 - 11.0 10*3/uL 3.22 (L) (E)   RBC 4.0 - 5.2 10*6/uL 2.60 (L) (E)   Hemoglobin 12.0 - 16.0 g/dL 8.5 (L) (E)   Hematocrit 36.0 - 46.0 % 23.7 (L) (E)   Platelets 140 - 440 10*3/uL 199 (E)   RDW 12.0 - 16.8 % 16.6 (E)   MCV 80.0 - 100.0 fL 91.2 (E)   MCH 26.0 - 34.0 pg 32.7 (E)   MCHC 31.0 - 37.0 g/dL 35.9 (E)   MPV 8.4 - 12.4 fL 9.3 (E)   Neutrophil Rel % 45 - 80 % 23.6 (L) (E)   Lymphocyte Rel % 15 - 50 % 63.7 (H) (E)   Monocyte Rel % 0 - 15 % 11.5 (E)   Eosinophil Rel % 0 - 7 % 0.6 (E)   Basophil Rel % 0 - 2 % 0.3 (E)   Immature Granulocyte Rel % 0.0 - 1.0 % 0.3 (E)   Neutrophils Absolute 2.0 - 8.8 10*3/uL 0.76 (L) (E)    Lymphocytes Absolute 0.7 - 5.5 10*3/uL 2.05 (E)   Monocytes Absolute 0.0 - 1.7 10*3/uL 0.37 (E)   Eosinophils Absolute 0.0 - 0.8 10*3/uL 0.02 (E)   Basophils Absolute 0.0 - 0.2 10*3/uL 0.01 (E)   Immature Grans, Absolute 0.00 - 0.10 10*3/uL 0.01 (E)   nRBC 0 /100(WBC) 0 (E)   Differential Type (arb'U) Hospital CBC w/AutoDiff (E)   Mean Bld Glu Estim. mg/dL 186 (E)   (L): Data is abnormally low  (H): Data is abnormally high  (E): External lab result    Family History   Problem Relation Age of Onset    Heart disease Father     Diabetes Father     COPD Father     Hypertension Father     Colon cancer Mother     Cancer Mother     Heart disease Mother     Arthritis Mother     COPD Mother     Heart disease Brother     Lung cancer Brother     No Known Problems Sister     No Known Problems Son     No Known Problems Daughter     No Known Problems Paternal Grandmother     No Known Problems Maternal Grandmother     No Known Problems Maternal Aunt     No Known Problems Paternal Aunt     BRCA 1/2 Neg Hx     Breast cancer Neg Hx     Endometrial cancer Neg Hx     Ovarian cancer Neg Hx     Malig Hyperthermia Neg Hx      Social History     Socioeconomic History    Marital status: Single   Tobacco Use    Smoking status: Never     Passive exposure: Never    Smokeless tobacco: Never   Vaping Use    Vaping status: Never Used   Substance and Sexual Activity    Alcohol use: Yes     Comment: rarely on occas    Drug use: No    Sexual activity: Defer     Birth control/protection: Post-menopausal     Past Medical History:   Diagnosis Date    Allergic     Anxiety     Depression     Diabetes mellitus     Elevated cholesterol     Hormone replacement therapy     Hypothyroidism     PONV (postoperative nausea and vomiting)      Past Surgical History:   Procedure Laterality Date    BACK SURGERY       SECTION      CHOLECYSTECTOMY      COLONOSCOPY N/A 2020    Procedure: COLONOSCOPY;  Surgeon: Emory Acosta MD;  Location: [facility]  ENDOSCOPY;  Service: Gastroenterology;  Laterality: N/A;  PRE- SCREENING, FAMILY HX COLON CA  POST- NORMAL    LUMBAR DISCECTOMY Right 2/23/2020    Procedure: L3 & L4 MICRO DISCECTOMY WITH METRIX;  Surgeon: Edwin Mcgraw MD;  Location: [facility];  Service: Neurosurgery;  Laterality: Right;    TONSILLECTOMY      TUBAL ABDOMINAL LIGATION      URETEROSCOPY LASER LITHOTRIPSY WITH STENT INSERTION Bilateral 10/4/2020    Procedure: URETEROSCOPY LASER LITHOTRIPSY WITH bilateral retrogrades, and bilateral STENT INSERTION;  Surgeon: Cm Bennett MD;  Location: [facility];  Service: Urology;  Laterality: Bilateral;    US GUIDED CYST ASPIRATION BREAST N/A 3/23/2022    US GUIDED LYMPH NODE BIOPSY  5/19/2024       Current Outpatient Medications:     azelastine (ASTEPRO) 0.15 % solution nasal spray, 1 spray into the nostril(s) as directed by provider Daily. (Patient taking differently: 1 spray into the nostril(s) as directed by provider Daily As Needed.), Disp: 3 each, Rfl: 1    Continuous Glucose Sensor (FreeStyle Tatiana 3 Sensor) misc, Use 1 each Every 14 (Fourteen) Days., Disp: 2 each, Rfl: 3    fluticasone (FLONASE) 50 MCG/ACT nasal spray, 2 sprays into the nostril(s) as directed by provider Daily. (Patient taking differently: 1 spray by Each Nare route Daily.), Disp: 3 bottle, Rfl: 1    gabapentin (NEURONTIN) 300 MG capsule, Take 1 capsule by mouth 2 (Two) Times a Day., Disp: 180 capsule, Rfl: 1    glucose blood (OneTouch Verio) test strip, Use as instructed to check glucose, Disp: 50 each, Rfl: 6    Insulin Glargine (LANTUS SOLOSTAR) 100 UNIT/ML injection pen, Inject 10 Units under the skin into the appropriate area as directed Every Night., Disp: 3 mL, Rfl: 3    Insulin Lispro, 1 Unit Dial, (HUMALOG) 100 UNIT/ML solution pen-injector, Inject 6-10 Units under the skin into the appropriate area as directed 3 (Three) Times a Day., Disp: , Rfl:     Insulin Pen Needle (Pen Needles) 30G X 5 MM misc, Use 1  each Daily., Disp: 100 each, Rfl: 1    Lancets 30G misc, Use to check blood sugar once daily for diabetes E11.65, Disp: 100 each, Rfl: 2    levothyroxine (SYNTHROID, LEVOTHROID) 112 MCG tablet, TAKE ONE TABLET BY MOUTH DAILY EXCEPT SUNDAY when YOU TAKE  HALF TABLET, Disp: 90 tablet, Rfl: 1    lisinopril (PRINIVIL,ZESTRIL) 2.5 MG tablet, TAKE ONE TABLET BY MOUTH DAILY, Disp: 90 tablet, Rfl: 1    ondansetron (ZOFRAN) 8 MG tablet, Take 1 tablet by mouth Every 8 (Eight) Hours As Needed., Disp: , Rfl:     promethazine (PHENERGAN) 25 MG tablet, Take 0.5-1 tablets by mouth Every 6 (Six) Hours As Needed., Disp: , Rfl:     rosuvastatin (CRESTOR) 10 MG tablet, TAKE ONE TABLET BY MOUTH EVERY EVENING, Disp: 90 tablet, Rfl: 1    sertraline (ZOLOFT) 100 MG tablet, TAKE ONE TABLET BY MOUTH DAILY, Disp: 90 tablet, Rfl: 1    estradiol (ESTRACE) 1 MG tablet, Take 1 tablet by mouth Daily for 270 days. (Patient not taking: Reported on 8/19/2024), Disp: 30 tablet, Rfl: 8    metFORMIN ER (GLUCOPHAGE-XR) 500 MG 24 hr tablet, Take 3 tablets by mouth Daily With Breakfast. (Patient not taking: Reported on 8/19/2024), Disp: 90 tablet, Rfl: 0    multivitamin with minerals tablet tablet, Take 1 tablet by mouth Daily. (Patient not taking: Reported on 8/19/2024), Disp: , Rfl:     Turmeric (QC TUMERIC COMPLEX PO), Take  by mouth. (Patient not taking: Reported on 8/19/2024), Disp: , Rfl:   Adhesive tape and Chlorhexidine    Objective   Vitals:    08/19/24 1102   BP: 110/80   Pulse: 95   Temp: 96.8 °F (36 °C)   SpO2: 99%          Physical Exam  Neurological:      General: No focal deficit present.      Mental Status: She is alert and oriented to person, place, and time.               Diagnoses and all orders for this visit:    1. Type 2 diabetes mellitus without complication, with long-term current use of insulin (Primary)         Assessment:     1- DM, Type 2  Uncontrolled with High risk , on steroids that can raise BG   CGM on file , reviewed   We  discussed the medications  Hypoglycemia and its importance was reviewed   Labs where shown to the patient   2. Hypertension, on ACE inh   3. Hyperlipidemia, on a statin   4. Long term insulin use   5. Bilateral Bare foot exam was performed, pulses are intact and patient is able to feel the monofilaments.      Plan:    1. Diet, exercise and weight management  2. Consistent food intake to avoid low blood sugars  3. Home medication includes for diabetes       No  Metfromin   Change to Lantus 40 units PM   Change to Lispro 12 units TID with meals + SSI   150-199 add 2 units   200-249 add 4 units   > 250 add 6 units   If she is not eating and has a high BG she can take SSI     4. Consistent checking of blood sugars using Tatiana   5. I reviewed the last progress note  6. Annual eye exam  7. Return in 1 months   8. Send in readings in 2 weeks if running high   9. Rx sent to the pharmacy  10. Labs : LDL today       I discussed with the patient/legal representative the risks and the benefits associated with the medications.  The patient/legal representative has been given the opportunity to ask questions.  Alternatives to the proposed treatment (s) were discussed, including the likely results of no treatment.  The patient/legal representative wishes to proceed.       Tremaine Edgar MD   08/19/24  11:10 EDT

## 2024-08-20 DIAGNOSIS — E11.65 TYPE 2 DIABETES MELLITUS WITH HYPERGLYCEMIA, WITHOUT LONG-TERM CURRENT USE OF INSULIN: ICD-10-CM

## 2024-08-20 LAB
CHOLEST SERPL-MCNC: 175 MG/DL (ref 0–200)
HDLC SERPL-MCNC: 29 MG/DL (ref 40–60)
IMP & REVIEW OF LAB RESULTS: NORMAL
LDLC SERPL CALC-MCNC: 93 MG/DL (ref 0–100)
TRIGL SERPL-MCNC: 316 MG/DL (ref 0–150)
VLDLC SERPL CALC-MCNC: 53 MG/DL (ref 5–40)

## 2024-08-20 RX ORDER — BLOOD-GLUCOSE SENSOR
1 EACH MISCELLANEOUS
Qty: 2 EACH | Refills: 2 | Status: SHIPPED | OUTPATIENT
Start: 2024-08-20

## 2024-09-24 ENCOUNTER — OFFICE VISIT (OUTPATIENT)
Dept: ENDOCRINOLOGY | Age: 54
End: 2024-09-24
Payer: COMMERCIAL

## 2024-09-24 VITALS
SYSTOLIC BLOOD PRESSURE: 118 MMHG | HEART RATE: 97 BPM | WEIGHT: 144.6 LBS | BODY MASS INDEX: 26.44 KG/M2 | DIASTOLIC BLOOD PRESSURE: 70 MMHG | OXYGEN SATURATION: 98 %

## 2024-09-24 DIAGNOSIS — E11.65 TYPE 2 DIABETES MELLITUS WITH HYPERGLYCEMIA, WITHOUT LONG-TERM CURRENT USE OF INSULIN: ICD-10-CM

## 2024-09-24 PROCEDURE — 99214 OFFICE O/P EST MOD 30 MIN: CPT | Performed by: INTERNAL MEDICINE

## 2024-09-24 PROCEDURE — 95251 CONT GLUC MNTR ANALYSIS I&R: CPT | Performed by: INTERNAL MEDICINE

## 2024-09-24 RX ORDER — INSULIN LISPRO 100 [IU]/ML
14 INJECTION, SOLUTION INTRAVENOUS; SUBCUTANEOUS 3 TIMES DAILY
Qty: 15 ML | Refills: 1 | Status: SHIPPED | OUTPATIENT
Start: 2024-09-24 | End: 2024-12-04

## 2024-09-24 RX ORDER — NYSTATIN 100000 [USP'U]/ML
500000 SUSPENSION ORAL 4 TIMES DAILY
COMMUNITY
Start: 2024-09-19 | End: 2024-10-03

## 2024-09-24 RX ORDER — LEVOFLOXACIN 750 MG/1
750 TABLET, FILM COATED ORAL DAILY
COMMUNITY
Start: 2024-09-19 | End: 2024-09-26

## 2024-10-08 RX ORDER — SERTRALINE HYDROCHLORIDE 100 MG/1
100 TABLET, FILM COATED ORAL DAILY
Qty: 90 TABLET | Refills: 0 | Status: SHIPPED | OUTPATIENT
Start: 2024-10-08

## 2024-10-08 RX ORDER — LISINOPRIL 2.5 MG/1
2.5 TABLET ORAL DAILY
Qty: 90 TABLET | Refills: 0 | Status: SHIPPED | OUTPATIENT
Start: 2024-10-08

## 2024-10-08 RX ORDER — LEVOTHYROXINE SODIUM 112 UG/1
TABLET ORAL
Qty: 90 TABLET | Refills: 0 | Status: SHIPPED | OUTPATIENT
Start: 2024-10-08

## 2024-10-08 RX ORDER — GABAPENTIN 300 MG/1
300 CAPSULE ORAL 2 TIMES DAILY
Qty: 130 CAPSULE | Refills: 0 | Status: SHIPPED | OUTPATIENT
Start: 2024-10-08 | End: 2024-10-09 | Stop reason: SDUPTHER

## 2024-10-08 NOTE — TELEPHONE ENCOUNTER
Caller: [facility] - [address] - [phone] [facility] [phone] FX    Relationship:     Best call back number: [phone]    Requested Prescriptions:   Requested Prescriptions     Pending Prescriptions Disp Refills    sertraline (ZOLOFT) 100 MG tablet [Pharmacy Med Name: sertraline 100 mg tablet] 90 tablet 1     Sig: TAKE ONE TABLET BY MOUTH DAILY    levothyroxine (SYNTHROID, LEVOTHROID) 112 MCG tablet [Pharmacy Med Name: levothyroxine 112 mcg tablet] 90 tablet 1     Sig: TAKE ONE TABLET BY MOUTH DAILY EXCEPT ON SUNDAY TAKE ONE-HALF TABLET AS DIRECTED    lisinopril (PRINIVIL,ZESTRIL) 2.5 MG tablet [Pharmacy Med Name: lisinopril 2.5 mg tablet] 90 tablet 1     Sig: TAKE ONE TABLET BY MOUTH DAILY    gabapentin (NEURONTIN) 300 MG capsule 180 capsule 1     Sig: Take 1 capsule by mouth 2 (Two) Times a Day.        Pharmacy where request should be sent: [facility] - [address] - [phone] [facility] [phone] FX     Last office visit with prescribing clinician: 4/22/2024   Last telemedicine visit with prescribing clinician: Visit date not found   Next office visit with prescribing clinician: Visit date not found     Additional details provided by patient: THEY WANT TO SHORT FILL THE GABAPENTIN  PILLS.  AS THEY HAD TO SHORT FILL HER LAST PRESCRIPTION.     Does the patient have less than a 3 day supply:  [x] Yes  [] No    Would you like a call back once the refill request has been completed: [] Yes [x] No    If the office needs to give you a call back, can they leave a voicemail: [] Yes [x] No    Anita Jackson Rep   10/08/24 10:02 EDT

## 2024-10-09 RX ORDER — GABAPENTIN 300 MG/1
300 CAPSULE ORAL 2 TIMES DAILY
Qty: 60 CAPSULE | Refills: 0 | Status: SHIPPED | OUTPATIENT
Start: 2024-10-09

## 2024-10-17 DIAGNOSIS — E11.65 TYPE 2 DIABETES MELLITUS WITH HYPERGLYCEMIA, WITHOUT LONG-TERM CURRENT USE OF INSULIN: Primary | ICD-10-CM

## 2024-10-17 RX ORDER — BLOOD-GLUCOSE SENSOR
1 EACH MISCELLANEOUS
Qty: 2 EACH | Refills: 1 | Status: SHIPPED | OUTPATIENT
Start: 2024-10-17 | End: 2024-10-18

## 2024-10-18 RX ORDER — BLOOD-GLUCOSE SENSOR
EACH MISCELLANEOUS
Qty: 2 EACH | Refills: 3 | Status: SHIPPED | OUTPATIENT
Start: 2024-10-18

## 2024-10-28 RX ORDER — FLURBIPROFEN SODIUM 0.3 MG/ML
1 SOLUTION/ DROPS OPHTHALMIC 4 TIMES DAILY
Qty: 400 EACH | Refills: 3 | Status: SHIPPED | OUTPATIENT
Start: 2024-10-28

## 2024-11-05 ENCOUNTER — OFFICE VISIT (OUTPATIENT)
Dept: INTERNAL MEDICINE | Facility: CLINIC | Age: 54
End: 2024-11-05
Payer: COMMERCIAL

## 2024-11-05 VITALS
HEIGHT: 62 IN | SYSTOLIC BLOOD PRESSURE: 98 MMHG | OXYGEN SATURATION: 98 % | BODY MASS INDEX: 26.31 KG/M2 | TEMPERATURE: 98 F | HEART RATE: 97 BPM | DIASTOLIC BLOOD PRESSURE: 64 MMHG | WEIGHT: 143 LBS

## 2024-11-05 DIAGNOSIS — E78.00 HYPERCHOLESTEROLEMIA: Primary | ICD-10-CM

## 2024-11-05 DIAGNOSIS — M51.369 DEGENERATION OF INTERVERTEBRAL DISC OF LUMBAR REGION, UNSPECIFIED WHETHER PAIN PRESENT: ICD-10-CM

## 2024-11-05 DIAGNOSIS — F41.8 MIXED ANXIETY DEPRESSIVE DISORDER: ICD-10-CM

## 2024-11-05 DIAGNOSIS — E03.9 ACQUIRED HYPOTHYROIDISM: ICD-10-CM

## 2024-11-05 PROCEDURE — 99214 OFFICE O/P EST MOD 30 MIN: CPT | Performed by: FAMILY MEDICINE

## 2024-11-05 RX ORDER — LEVOTHYROXINE SODIUM 112 UG/1
TABLET ORAL
Qty: 90 TABLET | Refills: 0 | Status: SHIPPED | OUTPATIENT
Start: 2024-11-05

## 2024-11-05 RX ORDER — SERTRALINE HYDROCHLORIDE 100 MG/1
100 TABLET, FILM COATED ORAL DAILY
Qty: 90 TABLET | Refills: 1 | Status: SHIPPED | OUTPATIENT
Start: 2024-11-05

## 2024-11-05 RX ORDER — LISINOPRIL 2.5 MG/1
2.5 TABLET ORAL DAILY
Qty: 90 TABLET | Refills: 1 | Status: SHIPPED | OUTPATIENT
Start: 2024-11-05

## 2024-11-05 RX ORDER — ROSUVASTATIN CALCIUM 10 MG/1
10 TABLET, COATED ORAL EVERY EVENING
Qty: 90 TABLET | Refills: 1 | Status: SHIPPED | OUTPATIENT
Start: 2024-11-05

## 2024-11-05 RX ORDER — GABAPENTIN 300 MG/1
300 CAPSULE ORAL 2 TIMES DAILY
Qty: 180 CAPSULE | Refills: 1 | Status: SHIPPED | OUTPATIENT
Start: 2024-11-05

## 2024-11-05 NOTE — PROGRESS NOTES
Subjective   Anu Adam is a 54 y.o. female.   Chief Complaint   Patient presents with    Depression    Hypothyroidism    Hyperlipidemia    Degenerative disc disease       History of Present Illness     Hyperlipidemia- patient is on rosuvastatin at 10 mg a day.  She takes it every day.  She has no side effects.  LFTs normal.     Lumbar degenerative disc disease-patient had microdiscectomy at L3-L4 in February 2020 by Dr. Mcgraw.  She was started on gabapentin at 300 mg twice a day after surgery.  It controled her symptoms.  When she saw Dr. Mcgraw last time, they advised her to try weaning off.  She tried to do it as advised, but after 1 to 2 weeks of decreasing dose from twice a day to once a day she started having right leg pain.  It was the same as prior to initiation of therapy.  So she increased it back to 300 mg twice a day as advised by Dr. Ramos.  All symptoms resolved after she did it.  It was suggested by Dr. Mcgraw that will take over prescribing it.  From last office visit-she takes it as prescribed.  She has no side effects.  It also helps with neuropathy from chemo.    Depression with anxiety- on sertraline at 100 mg a day.  She takes it every day.  She has no side effects.  No suicidal ideations, no aggressive behaviors. Mood is normal most of the time.  No excessive worries.  She has very supportive family and friends.  PHQ-9 is at 4 from 2, GAY-7 at 2 from 4.     Hypothyroidism-patient takes levothyroxine 112 mcg.  She takes 1 tablet every day except of Sundays when she takes half tablet.  She takes it on empty stomach and does not eat for at least 30 minutes after taking it. TSH at 0.23, free T4 elevated at 1.55.    Left breast cancer-patient follows up with oncology.  She will have last chemo next week.  She is scheduled for double mastectomy on 12/11/2024.      Review of Systems   Respiratory:  Negative for shortness of breath.    Cardiovascular:  Negative for chest pain and  palpitations.   Neurological:  Negative for light-headedness.   Psychiatric/Behavioral:  Negative for suicidal ideas.          Objective   Wt Readings from Last 3 Encounters:   11/05/24 64.9 kg (143 lb)   09/24/24 65.6 kg (144 lb 9.6 oz)   08/19/24 64.6 kg (142 lb 6.4 oz)      Vitals:    11/05/24 1127   BP: 98/64   Pulse: 97   Temp: 98 °F (36.7 °C)   SpO2: 98%     Temp Readings from Last 3 Encounters:   11/05/24 98 °F (36.7 °C)   08/19/24 96.8 °F (36 °C) (Temporal)   04/22/24 98.2 °F (36.8 °C) (Oral)     BP Readings from Last 3 Encounters:   11/05/24 98/64   09/24/24 118/70   08/19/24 110/80     Pulse Readings from Last 3 Encounters:   11/05/24 97   09/24/24 97   08/19/24 95     Body mass index is 26.15 kg/m².    Physical Exam  Constitutional:       Appearance: She is well-developed.   Neck:      Vascular: No carotid bruit.   Cardiovascular:      Rate and Rhythm: Normal rate and regular rhythm.      Heart sounds: Normal heart sounds.   Pulmonary:      Effort: Pulmonary effort is normal.      Breath sounds: Normal breath sounds.   Skin:     General: Skin is warm and dry.   Neurological:      Mental Status: She is alert.   Psychiatric:         Behavior: Behavior normal.         Assessment & Plan   Diagnoses and all orders for this visit:    1. Hypercholesterolemia (Primary)  -     Lipid Panel With LDL / HDL Ratio; Future    2. Acquired hypothyroidism  -     Thyroid Cascade Profile; Future    3. Mixed anxiety depressive disorder    4. Degeneration of intervertebral disc of lumbar region, unspecified whether pain present    Other orders  -     levothyroxine (SYNTHROID, LEVOTHROID) 112 MCG tablet; TAKE ONE TABLET BY MOUTH DAILY EXCEPT ON SUNDAY AND THURSDAY TAKE ONE-HALF TABLET AS DIRECTED  Dispense: 90 tablet; Refill: 0  -     rosuvastatin (CRESTOR) 10 MG tablet; Take 1 tablet by mouth Every Evening.  Dispense: 90 tablet; Refill: 1  -     sertraline (ZOLOFT) 100 MG tablet; Take 1 tablet by mouth Daily.  Dispense: 90  tablet; Refill: 1  -     lisinopril (PRINIVIL,ZESTRIL) 2.5 MG tablet; Take 1 tablet by mouth Daily.  Dispense: 90 tablet; Refill: 1  -     gabapentin (NEURONTIN) 300 MG capsule; Take 1 capsule by mouth 2 (Two) Times a Day.  Dispense: 180 capsule; Refill: 1        HLP-check labs.  Continue current treatment.  Follow-up in 6 months.    Hypothyroidism-uncontrolled.  We are decreasing dose of levothyroxine 115, patient will take 1 tablet 5 days a week, half tablet in Sunday and Thursday.  Labs in 2 months.  Follow-up in 6 months.    Depression/anxiety-continue current treatment.  Follow-up in 6 months.    Degenerative disc disease/neuropathy-continue current treatment.  Follow-up in 6 months.

## 2024-11-18 DIAGNOSIS — E11.65 TYPE 2 DIABETES MELLITUS WITH HYPERGLYCEMIA, WITHOUT LONG-TERM CURRENT USE OF INSULIN: Primary | ICD-10-CM

## 2024-11-19 RX ORDER — BLOOD-GLUCOSE SENSOR
1 EACH MISCELLANEOUS
Qty: 2 EACH | Refills: 0 | Status: SHIPPED | OUTPATIENT
Start: 2024-11-19

## 2024-11-26 ENCOUNTER — OFFICE VISIT (OUTPATIENT)
Dept: ENDOCRINOLOGY | Age: 54
End: 2024-11-26
Payer: COMMERCIAL

## 2024-11-26 VITALS
HEIGHT: 62 IN | BODY MASS INDEX: 26.46 KG/M2 | DIASTOLIC BLOOD PRESSURE: 82 MMHG | TEMPERATURE: 97.9 F | OXYGEN SATURATION: 100 % | SYSTOLIC BLOOD PRESSURE: 126 MMHG | HEART RATE: 87 BPM | WEIGHT: 143.8 LBS

## 2024-11-26 DIAGNOSIS — E11.65 TYPE 2 DIABETES MELLITUS WITH HYPERGLYCEMIA, WITHOUT LONG-TERM CURRENT USE OF INSULIN: ICD-10-CM

## 2024-11-26 DIAGNOSIS — E11.65 TYPE 2 DIABETES MELLITUS WITH HYPERGLYCEMIA, WITH LONG-TERM CURRENT USE OF INSULIN: Primary | ICD-10-CM

## 2024-11-26 DIAGNOSIS — Z79.4 TYPE 2 DIABETES MELLITUS WITH HYPERGLYCEMIA, WITH LONG-TERM CURRENT USE OF INSULIN: Primary | ICD-10-CM

## 2024-11-26 PROCEDURE — 99214 OFFICE O/P EST MOD 30 MIN: CPT | Performed by: INTERNAL MEDICINE

## 2024-11-26 PROCEDURE — 95251 CONT GLUC MNTR ANALYSIS I&R: CPT | Performed by: INTERNAL MEDICINE

## 2024-11-26 RX ORDER — ACYCLOVIR 800 MG/1
1 TABLET ORAL
Qty: 2 EACH | Refills: 2 | Status: SHIPPED | OUTPATIENT
Start: 2024-11-26

## 2024-11-26 RX ORDER — FLURBIPROFEN SODIUM 0.3 MG/ML
1 SOLUTION/ DROPS OPHTHALMIC 4 TIMES DAILY
Qty: 400 EACH | Refills: 3 | Status: SHIPPED | OUTPATIENT
Start: 2024-11-26

## 2024-11-26 RX ORDER — INSULIN LISPRO 100 [IU]/ML
14 INJECTION, SOLUTION INTRAVENOUS; SUBCUTANEOUS 3 TIMES DAILY
Qty: 29.82 ML | Refills: 0 | Status: SHIPPED | OUTPATIENT
Start: 2024-11-26 | End: 2025-02-05

## 2024-11-26 NOTE — PROGRESS NOTES
Chief complaint   Chief Complaint   Patient presents with    Diabetes          Subjective     History of Present Illness:      This is a 54 year old female I am seeing for type 2 DM   referred by PCP   Last OV was 8 weeks ago   She is here for a follow up   other medical history is   1- HTN   2- HLD   3- other   Pt had T2DM for many years   Current home medication for diabetes     Off Metfromin    Lantus 38 units PM    Lispro 14 units TID with meals + SSI   150-199 add 2 units   200-249 add 4 units   > 250 add 6 units   If she is not eating and has a high BG she can take SSI   the A1c was 8.1 in   Checks blood sugar at home using CGM Tatiana 3 +  Checks blood sugar 4 times per day   SBM % in range and rest is high  mainly after BF , no lows   pt states that she is better BG with the new insulin dosages and sometimes she does not take her insulin with BF and that causes her to have a high readings at lunch she is is done chemo for breast cancer now  then she will have surgery next month and may be radiation and there is few Hypoglycemic episodes  No AM Headaches /Nightmares  No Polyuria / Polydipsia  No Blurring of Vision  Last Dilated Pupil Exam, in  , no DM in the eye   NoTingling / numbness in feet  No Dizziness / lightheadedness  Dietary Compliance, no   Exercise , bike   Weight is going down   No Falls  No Nausea, vomiting / Diarrhea   Latest Reference Range & Units 24 11:43   Total Cholesterol 0 - 200 mg/dL 175   HDL Cholesterol 40 - 60 mg/dL 29 (L)   LDL Cholesterol  0 - 100 mg/dL 93   Triglycerides 0 - 150 mg/dL 316 (H)   VLDL Cholesterol Parviz 5 - 40 mg/dL 53 (H)   (L): Data is abnormally low  (H): Data is abnormally high   Latest Reference Range & Units 24 00:44   Magnesium 1.6 - 2.6 mg/dL  1.6 - 2.6 mg/dL 1.4 (L) (E)  1.4 (L) (E)   Phosphorus 2.3 - 4.7 mg/dL  2.3 - 4.7 mg/dL 2.0 (L) (E)  1.9 (L) (E)   Hemoglobin A1C 4.3 - 5.6 % 8.1 (H) (E)   WBC 4.5 - 11.0 10*3/uL 3.22 (L) (E)    RBC 4.0 - 5.2 10*6/uL 2.60 (L) (E)   Hemoglobin 12.0 - 16.0 g/dL 8.5 (L) (E)   Hematocrit 36.0 - 46.0 % 23.7 (L) (E)   Platelets 140 - 440 10*3/uL 199 (E)   RDW 12.0 - 16.8 % 16.6 (E)   MCV 80.0 - 100.0 fL 91.2 (E)   MCH 26.0 - 34.0 pg 32.7 (E)   MCHC 31.0 - 37.0 g/dL 35.9 (E)   MPV 8.4 - 12.4 fL 9.3 (E)   Neutrophil Rel % 45 - 80 % 23.6 (L) (E)   Lymphocyte Rel % 15 - 50 % 63.7 (H) (E)   Monocyte Rel % 0 - 15 % 11.5 (E)   Eosinophil Rel % 0 - 7 % 0.6 (E)   Basophil Rel % 0 - 2 % 0.3 (E)   Immature Granulocyte Rel % 0.0 - 1.0 % 0.3 (E)   Neutrophils Absolute 2.0 - 8.8 10*3/uL 0.76 (L) (E)   Lymphocytes Absolute 0.7 - 5.5 10*3/uL 2.05 (E)   Monocytes Absolute 0.0 - 1.7 10*3/uL 0.37 (E)   Eosinophils Absolute 0.0 - 0.8 10*3/uL 0.02 (E)   Basophils Absolute 0.0 - 0.2 10*3/uL 0.01 (E)   Immature Grans, Absolute 0.00 - 0.10 10*3/uL 0.01 (E)   nRBC 0 /100(WBC) 0 (E)   Differential Type (arb'U) Hospital CBC w/AutoDiff (E)   Mean Bld Glu Estim. mg/dL 186 (E)   (L): Data is abnormally low  (H): Data is abnormally high  (E): External lab result      Family History   Problem Relation Age of Onset    Heart disease Father     Diabetes Father     COPD Father     Hypertension Father     Colon cancer Mother     Cancer Mother     Heart disease Mother     Arthritis Mother     COPD Mother     Heart disease Brother     Lung cancer Brother     No Known Problems Sister     No Known Problems Son     No Known Problems Daughter     No Known Problems Paternal Grandmother     No Known Problems Maternal Grandmother     No Known Problems Maternal Aunt     No Known Problems Paternal Aunt     BRCA 1/2 Neg Hx     Breast cancer Neg Hx     Endometrial cancer Neg Hx     Ovarian cancer Neg Hx     Malig Hyperthermia Neg Hx      Social History     Socioeconomic History    Marital status: Single   Tobacco Use    Smoking status: Never     Passive exposure: Never    Smokeless tobacco: Never   Vaping Use    Vaping status: Never Used   Substance and Sexual  Activity    Alcohol use: Yes     Comment: rarely on occas    Drug use: No    Sexual activity: Defer     Birth control/protection: Post-menopausal     Past Medical History:   Diagnosis Date    Allergic     Anxiety     Depression     Diabetes mellitus     Elevated cholesterol     Hormone replacement therapy     Hypothyroidism     PONV (postoperative nausea and vomiting)      Past Surgical History:   Procedure Laterality Date    BACK SURGERY       SECTION      CHOLECYSTECTOMY      COLONOSCOPY N/A 2020    Procedure: COLONOSCOPY;  Surgeon: Emory Acosta MD;  Location: [facility] ENDOSCOPY;  Service: Gastroenterology;  Laterality: N/A;  PRE- SCREENING, FAMILY HX COLON CA  POST- NORMAL    LUMBAR DISCECTOMY Right 2020    Procedure: L3 & L4 MICRO DISCECTOMY WITH METRIX;  Surgeon: Edwin Mcgraw MD;  Location: [facility] MAIN OR;  Service: Neurosurgery;  Laterality: Right;    TONSILLECTOMY      TUBAL ABDOMINAL LIGATION      URETEROSCOPY LASER LITHOTRIPSY WITH STENT INSERTION Bilateral 10/4/2020    Procedure: URETEROSCOPY LASER LITHOTRIPSY WITH bilateral retrogrades, and bilateral STENT INSERTION;  Surgeon: Cm Bennett MD;  Location: [facility] MAIN OR;  Service: Urology;  Laterality: Bilateral;    US GUIDED CYST ASPIRATION BREAST N/A 3/23/2022    US GUIDED LYMPH NODE BIOPSY  2024       Current Outpatient Medications:     azelastine (ASTEPRO) 0.15 % solution nasal spray, 1 spray into the nostril(s) as directed by provider Daily. (Patient taking differently: Administer 1 spray into the nostril(s) as directed by provider Daily As Needed.), Disp: 3 each, Rfl: 1    Continuous Glucose Sensor (FreeStyle Tatiana 3 Sensor) misc, Apply 1 each topically Every 14 (Fourteen) Days. Indications: Type 2 Diabetes, Disp: 2 each, Rfl: 0    fluticasone (FLONASE) 50 MCG/ACT nasal spray, 2 sprays into the nostril(s) as directed by provider Daily. (Patient taking differently: 1 spray by Each Nare route Daily.), Disp:  "3 bottle, Rfl: 1    gabapentin (NEURONTIN) 300 MG capsule, Take 1 capsule by mouth 2 (Two) Times a Day., Disp: 180 capsule, Rfl: 1    glucose blood (OneTouch Verio) test strip, Use as instructed to check glucose, Disp: 50 each, Rfl: 6    Insulin Glargine (LANTUS SOLOSTAR) 100 UNIT/ML injection pen, Inject 38 Units under the skin into the appropriate area as directed Every Night., Disp: 15 mL, Rfl: 3    Insulin Lispro, 1 Unit Dial, (HUMALOG) 100 UNIT/ML solution pen-injector, Inject 14 Units under the skin into the appropriate area as directed 3 (Three) Times a Day for 71 days., Disp: 15 mL, Rfl: 1    Insulin Pen Needle (B-D UF III MINI PEN NEEDLES) 31G X 5 MM misc, Use 1 each 4 (Four) Times a Day., Disp: 400 each, Rfl: 3    Lancets 30G misc, Use to check blood sugar once daily for diabetes E11.65, Disp: 100 each, Rfl: 2    levothyroxine (SYNTHROID, LEVOTHROID) 112 MCG tablet, TAKE ONE TABLET BY MOUTH DAILY EXCEPT ON SUNDAY AND THURSDAY TAKE ONE-HALF TABLET AS DIRECTED, Disp: 90 tablet, Rfl: 0    lisinopril (PRINIVIL,ZESTRIL) 2.5 MG tablet, Take 1 tablet by mouth Daily., Disp: 90 tablet, Rfl: 1    multivitamin with minerals tablet tablet, Take 1 tablet by mouth Daily., Disp: , Rfl:     ondansetron (ZOFRAN) 8 MG tablet, Take 1 tablet by mouth Every 8 (Eight) Hours As Needed., Disp: , Rfl:     promethazine (PHENERGAN) 25 MG tablet, Take 0.5-1 tablets by mouth Every 6 (Six) Hours As Needed., Disp: , Rfl:     rosuvastatin (CRESTOR) 10 MG tablet, Take 1 tablet by mouth Every Evening., Disp: 90 tablet, Rfl: 1    sertraline (ZOLOFT) 100 MG tablet, Take 1 tablet by mouth Daily., Disp: 90 tablet, Rfl: 1    Turmeric (QC TUMERIC COMPLEX PO), Take  by mouth., Disp: , Rfl:   Adhesive tape and Chlorhexidine    Objective   Vitals:    11/26/24 1249   BP: 126/82   Pulse: 87   Temp: 97.9 °F (36.6 °C)   SpO2: 100%          /82   Pulse 87   Temp 97.9 °F (36.6 °C) (Oral)   Ht 157.5 cm (62.01\")   Wt 65.2 kg (143 lb 12.8 oz)   " LMP 11/16/2013 (LMP Unknown) Comment: patient states her last period was six years ago and is post menopausal   SpO2 100%   BMI 26.29 kg/m²       Physical Exam  Neurological:      General: No focal deficit present.      Mental Status: She is alert and oriented to person, place, and time.               Diagnoses and all orders for this visit:    1. Type 2 diabetes mellitus with hyperglycemia, with long-term current use of insulin (Primary)             Assessment:     1- DM, Type 2  Uncontrolled with High risk , on steroids that can raise BG   CGM on file , reviewed   We discussed the medications  Hypoglycemia and its importance was reviewed   Labs where shown to the patient   2. Hypertension, on ACE inh   3. Hyperlipidemia, on a statin , LDL is in range   4. Long term insulin use         Plan:    1. Diet, exercise and weight management  2. Consistent food intake to avoid low blood sugars  3. Home medication includes for diabetes       No  Metfromin    Lantus 38 units PM   Lispro 14 units TID with meals + SSI   150-199 add 2 units   200-249 add 4 units   > 250 add 6 units   She will try to take her insulin with all meals even if she is low     4. Consistent checking of blood sugars using Tatiana 3 +  5. I reviewed the last progress note  6. Annual eye exam  7. Return in 3 months   8. Send in readings in 2-4 weeks if running high   9. Rx sent to the pharmacy  10. Labs : A1c  today       I discussed with the patient/legal representative the risks and the benefits associated with the medications.  The patient/legal representative has been given the opportunity to ask questions.  Alternatives to the proposed treatment (s) were discussed, including the likely results of no treatment.  The patient/legal representative wishes to proceed.       Tremaine Edgar MD   11/26/24  12:51 EST

## 2024-11-27 LAB — HBA1C MFR BLD: 7.6 % (ref 4.8–5.6)

## 2025-01-03 RX ORDER — ROSUVASTATIN CALCIUM 10 MG/1
10 TABLET, COATED ORAL EVERY EVENING
Qty: 90 TABLET | Refills: 1 | Status: SHIPPED | OUTPATIENT
Start: 2025-01-03

## 2025-02-10 DIAGNOSIS — Z79.4 TYPE 2 DIABETES MELLITUS WITH HYPERGLYCEMIA, WITH LONG-TERM CURRENT USE OF INSULIN: ICD-10-CM

## 2025-02-10 DIAGNOSIS — E11.65 TYPE 2 DIABETES MELLITUS WITH HYPERGLYCEMIA, WITH LONG-TERM CURRENT USE OF INSULIN: ICD-10-CM

## 2025-02-10 RX ORDER — INSULIN LISPRO 100 [IU]/ML
INJECTION, SOLUTION INTRAVENOUS; SUBCUTANEOUS
Qty: 15 ML | Refills: 0 | Status: SHIPPED | OUTPATIENT
Start: 2025-02-10

## 2025-02-10 NOTE — TELEPHONE ENCOUNTER
Rx Refill Note  Requested Prescriptions     Pending Prescriptions Disp Refills    Insulin Lispro, 1 Unit Dial, (HUMALOG) 100 UNIT/ML solution pen-injector [Pharmacy Med Name: INSULIN LISPRO 100U/ML KWIKPEN 3ML] 27 mL 0     Sig: ADMINISTER 14 UNITS UNDER THE SKIN THREE TIMES DAILY FOR 71 DAYS AS DIRECTED      Last office visit with prescribing clinician: 11/26/2024   Last telemedicine visit with prescribing clinician: Visit date not found   Next office visit with prescribing clinician: 2/26/2025                         Would you like a call back once the refill request has been completed: [] Yes [] No    If the office needs to give you a call back, can they leave a voicemail: [] Yes [] No    Michelle Gonzalez  02/10/25, 12:18 EST

## 2025-02-26 ENCOUNTER — OFFICE VISIT (OUTPATIENT)
Dept: ENDOCRINOLOGY | Age: 55
End: 2025-02-26
Payer: COMMERCIAL

## 2025-02-26 VITALS
OXYGEN SATURATION: 100 % | HEIGHT: 62 IN | HEART RATE: 81 BPM | DIASTOLIC BLOOD PRESSURE: 68 MMHG | SYSTOLIC BLOOD PRESSURE: 122 MMHG | BODY MASS INDEX: 25.58 KG/M2 | WEIGHT: 139 LBS

## 2025-02-26 DIAGNOSIS — E11.65 TYPE 2 DIABETES MELLITUS WITH HYPERGLYCEMIA, WITH LONG-TERM CURRENT USE OF INSULIN: Primary | ICD-10-CM

## 2025-02-26 DIAGNOSIS — Z79.4 TYPE 2 DIABETES MELLITUS WITH HYPERGLYCEMIA, WITH LONG-TERM CURRENT USE OF INSULIN: Primary | ICD-10-CM

## 2025-02-26 LAB — HBA1C MFR BLD: 8.8 % (ref 4.8–5.6)

## 2025-02-26 RX ORDER — CYCLOBENZAPRINE HCL 10 MG
10 TABLET ORAL
COMMUNITY
Start: 2025-01-16 | End: 2026-02-21

## 2025-02-26 NOTE — PROGRESS NOTES
Chief complaint   Chief Complaint   Patient presents with    Type 2 diabetes mellitus with hyperglycemia, with long-term          Subjective     History of Present Illness:      This is a 54 year old female I am seeing for type 2 DM   referred by PCP   Last OV was 12 weeks ago   She is here for a follow up   other medical history is   1- HTN   2- HLD   3- other   Pt had T2DM for many years   Current home medication for diabetes     Off Metfromin    Lantus 34 units PM    Lispro 14 units TID with meals + SSI   150-199 add 2 units   200-249 add 4 units   > 250 add 6 units   If she is not eating and has a high BG she can take SSI   the A1c was 8.1 in 8-2024  Checks blood sugar at home using CGM Tatiana 3 +  Checks blood sugar 4 times per day   SBMG: ave is 224 32 % in range and rest is high  mainly after BF , 4 % lows   pt states that she is better BG with the new insulin dosages and sometimes she does not take her insulin with BF and that causes her to have a high readings at lunch she is is done chemo for breast cancer now  then she will have surgery next month and may be radiation and there is few Hypoglycemic episodes  No AM Headaches /Nightmares  No Polyuria / Polydipsia  No Blurring of Vision  Last Dilated Pupil Exam, in 2024 , no DM in the eye   NoTingling / numbness in feet  No Dizziness / lightheadedness  Dietary Compliance, no   Exercise , bike   Weight is going down   No Falls  No Nausea, vomiting / Diarrhea   Latest Reference Range & Units 08/19/24 11:43   Total Cholesterol 0 - 200 mg/dL 175   HDL Cholesterol 40 - 60 mg/dL 29 (L)   LDL Cholesterol  0 - 100 mg/dL 93   Triglycerides 0 - 150 mg/dL 316 (H)   VLDL Cholesterol Parviz 5 - 40 mg/dL 53 (H)   (L): Data is abnormally low  (H): Data is abnormally high   Latest Reference Range & Units 08/09/24 00:44   Magnesium 1.6 - 2.6 mg/dL  1.6 - 2.6 mg/dL 1.4 (L) (E)  1.4 (L) (E)   Phosphorus 2.3 - 4.7 mg/dL  2.3 - 4.7 mg/dL 2.0 (L) (E)  1.9 (L) (E)   Hemoglobin A1C  4.3 - 5.6 % 8.1 (H) (E)   WBC 4.5 - 11.0 10*3/uL 3.22 (L) (E)   RBC 4.0 - 5.2 10*6/uL 2.60 (L) (E)   Hemoglobin 12.0 - 16.0 g/dL 8.5 (L) (E)   Hematocrit 36.0 - 46.0 % 23.7 (L) (E)   Platelets 140 - 440 10*3/uL 199 (E)   RDW 12.0 - 16.8 % 16.6 (E)   MCV 80.0 - 100.0 fL 91.2 (E)   MCH 26.0 - 34.0 pg 32.7 (E)   MCHC 31.0 - 37.0 g/dL 35.9 (E)   MPV 8.4 - 12.4 fL 9.3 (E)   Neutrophil Rel % 45 - 80 % 23.6 (L) (E)   Lymphocyte Rel % 15 - 50 % 63.7 (H) (E)   Monocyte Rel % 0 - 15 % 11.5 (E)   Eosinophil Rel % 0 - 7 % 0.6 (E)   Basophil Rel % 0 - 2 % 0.3 (E)   Immature Granulocyte Rel % 0.0 - 1.0 % 0.3 (E)   Neutrophils Absolute 2.0 - 8.8 10*3/uL 0.76 (L) (E)   Lymphocytes Absolute 0.7 - 5.5 10*3/uL 2.05 (E)   Monocytes Absolute 0.0 - 1.7 10*3/uL 0.37 (E)   Eosinophils Absolute 0.0 - 0.8 10*3/uL 0.02 (E)   Basophils Absolute 0.0 - 0.2 10*3/uL 0.01 (E)   Immature Grans, Absolute 0.00 - 0.10 10*3/uL 0.01 (E)   nRBC 0 /100(WBC) 0 (E)   Differential Type (arb'U) Hospital CBC w/AutoDiff (E)   Mean Bld Glu Estim. mg/dL 186 (E)   (L): Data is abnormally low  (H): Data is abnormally high  (E): External lab result        Family History   Problem Relation Age of Onset    Heart disease Father     Diabetes Father     COPD Father     Hypertension Father     Colon cancer Mother     Cancer Mother     Heart disease Mother     Arthritis Mother     COPD Mother     Heart disease Brother     Lung cancer Brother     No Known Problems Sister     No Known Problems Son     No Known Problems Daughter     No Known Problems Paternal Grandmother     No Known Problems Maternal Grandmother     No Known Problems Maternal Aunt     No Known Problems Paternal Aunt     BRCA 1/2 Neg Hx     Breast cancer Neg Hx     Endometrial cancer Neg Hx     Ovarian cancer Neg Hx     Malig Hyperthermia Neg Hx      Social History     Socioeconomic History    Marital status: Single   Tobacco Use    Smoking status: Never     Passive exposure: Never    Smokeless tobacco: Never    Vaping Use    Vaping status: Never Used   Substance and Sexual Activity    Alcohol use: Yes     Comment: rarely on occas    Drug use: No    Sexual activity: Defer     Birth control/protection: Post-menopausal     Past Medical History:   Diagnosis Date    Allergic     Anxiety     Depression     Diabetes mellitus     Elevated cholesterol     Hormone replacement therapy     Hypothyroidism     PONV (postoperative nausea and vomiting)      Past Surgical History:   Procedure Laterality Date    BACK SURGERY       SECTION      CHOLECYSTECTOMY      COLONOSCOPY N/A 2020    Procedure: COLONOSCOPY;  Surgeon: Emory Acosta MD;  Location: [facility] ENDOSCOPY;  Service: Gastroenterology;  Laterality: N/A;  PRE- SCREENING, FAMILY HX COLON CA  POST- NORMAL    LUMBAR DISCECTOMY Right 2020    Procedure: L3 & L4 MICRO DISCECTOMY WITH METRIX;  Surgeon: Edwin Mcgraw MD;  Location: [facility] MAIN OR;  Service: Neurosurgery;  Laterality: Right;    TONSILLECTOMY      TUBAL ABDOMINAL LIGATION      URETEROSCOPY LASER LITHOTRIPSY WITH STENT INSERTION Bilateral 10/4/2020    Procedure: URETEROSCOPY LASER LITHOTRIPSY WITH bilateral retrogrades, and bilateral STENT INSERTION;  Surgeon: Cm Bennett MD;  Location: [facility] MAIN OR;  Service: Urology;  Laterality: Bilateral;    US GUIDED CYST ASPIRATION BREAST N/A 3/23/2022    US GUIDED LYMPH NODE BIOPSY  2024       Current Outpatient Medications:     azelastine (ASTEPRO) 0.15 % solution nasal spray, 1 spray into the nostril(s) as directed by provider Daily. (Patient taking differently: Administer 1 spray into the nostril(s) as directed by provider Daily As Needed.), Disp: 3 each, Rfl: 1    Continuous Glucose Sensor (FreeStyle Tatiana 3 Sensor) misc, Apply 1 each topically Every 14 (Fourteen) Days. Indications: Type 2 Diabetes, Disp: 2 each, Rfl: 2    cyclobenzaprine (FLEXERIL) 10 MG tablet, Take 1 tablet by mouth., Disp: , Rfl:     fluticasone (FLONASE) 50  MCG/ACT nasal spray, 2 sprays into the nostril(s) as directed by provider Daily. (Patient taking differently: 1 spray by Each Nare route Daily.), Disp: 3 bottle, Rfl: 1    gabapentin (NEURONTIN) 300 MG capsule, Take 1 capsule by mouth 2 (Two) Times a Day., Disp: 180 capsule, Rfl: 1    glucose blood (OneTouch Verio) test strip, Use as instructed to check glucose, Disp: 50 each, Rfl: 6    Insulin Glargine (LANTUS SOLOSTAR) 100 UNIT/ML injection pen, Inject 38 Units under the skin into the appropriate area as directed Every Night., Disp: 15 mL, Rfl: 3    Insulin Lispro, 1 Unit Dial, (HUMALOG) 100 UNIT/ML solution pen-injector, ADMINISTER 14 UNITS UNDER THE SKIN THREE TIMES DAILY FOR 71 DAYS AS DIRECTED, Disp: 15 mL, Rfl: 0    Insulin Pen Needle (B-D UF III MINI PEN NEEDLES) 31G X 5 MM misc, Use 1 each 4 (Four) Times a Day., Disp: 400 each, Rfl: 3    Lancets 30G misc, Use to check blood sugar once daily for diabetes E11.65, Disp: 100 each, Rfl: 2    levothyroxine (SYNTHROID, LEVOTHROID) 112 MCG tablet, TAKE ONE TABLET BY MOUTH DAILY EXCEPT ON SUNDAY AND THURSDAY TAKE ONE-HALF TABLET AS DIRECTED, Disp: 90 tablet, Rfl: 0    lisinopril (PRINIVIL,ZESTRIL) 2.5 MG tablet, Take 1 tablet by mouth Daily., Disp: 90 tablet, Rfl: 1    ondansetron (ZOFRAN) 8 MG tablet, Take 1 tablet by mouth Every 8 (Eight) Hours As Needed., Disp: , Rfl:     promethazine (PHENERGAN) 25 MG tablet, Take 0.5-1 tablets by mouth Every 6 (Six) Hours As Needed., Disp: , Rfl:     rosuvastatin (CRESTOR) 10 MG tablet, TAKE ONE TABLET BY MOUTH EVERY EVENING, Disp: 90 tablet, Rfl: 1    sertraline (ZOLOFT) 100 MG tablet, Take 1 tablet by mouth Daily., Disp: 90 tablet, Rfl: 1    multivitamin with minerals tablet tablet, Take 1 tablet by mouth Daily. (Patient not taking: Reported on 2/26/2025), Disp: , Rfl:     Turmeric (QC TUMERIC COMPLEX PO), Take  by mouth. (Patient not taking: Reported on 2/26/2025), Disp: , Rfl:   Adhesive tape and Chlorhexidine    Objective  "  Vitals:    02/26/25 1051   BP: 122/68   Pulse: 81   SpO2: 100%            /68 (BP Location: Right arm)   Pulse 81   Ht 157.5 cm (62.01\")   Wt 63 kg (139 lb)   LMP 11/16/2013 (LMP Unknown) Comment: patient states her last period was six years ago and is post menopausal   SpO2 100%   BMI 25.42 kg/m²       Physical Exam  Neurological:      General: No focal deficit present.      Mental Status: She is alert and oriented to person, place, and time.               Diagnoses and all orders for this visit:    1. Type 2 diabetes mellitus with hyperglycemia, with long-term current use of insulin (Primary)  -     Hemoglobin A1c               Assessment:     1- DM, Type 2  Uncontrolled with High risk , getting better   CGM on file , reviewed   We discussed the medications  Hypoglycemia and its importance was reviewed   Labs where shown to the patient   2. Hypertension, on ACE inh   3. Hyperlipidemia, on a statin , LDL is in range   4. Long term insulin use         Plan:    1. Diet, exercise and weight management  2. Consistent food intake to avoid low blood sugars  3. Home medication includes for diabetes       No  Metfromin   Change to  Lantus 38 units PM   Lispro 14 units TID with meals + SSI , she will take 8 units if having a small meal   150-199 add 2 units   200-249 add 4 units   > 250 add 6 units   She will try to take her insulin with all meals even if she is low     4. Consistent checking of blood sugars using Tatiana 3 +  5. I reviewed the last progress note  6. Annual eye exam  7. Return in 3 months   8. Send in readings in 2-4 weeks if running high   9. Rx sent to the pharmacy  10. Labs : A1c  today       I discussed with the patient/legal representative the risks and the benefits associated with the medications.  The patient/legal representative has been given the opportunity to ask questions.  Alternatives to the proposed treatment (s) were discussed, including the likely results of no treatment.  The " patient/legal representative wishes to proceed.       Tremaine Edgar MD   02/26/25  11:05 EST

## 2025-03-26 DIAGNOSIS — E11.65 TYPE 2 DIABETES MELLITUS WITH HYPERGLYCEMIA, WITH LONG-TERM CURRENT USE OF INSULIN: ICD-10-CM

## 2025-03-26 DIAGNOSIS — Z79.4 TYPE 2 DIABETES MELLITUS WITH HYPERGLYCEMIA, WITH LONG-TERM CURRENT USE OF INSULIN: ICD-10-CM

## 2025-03-26 RX ORDER — INSULIN LISPRO 100 [IU]/ML
INJECTION, SOLUTION INTRAVENOUS; SUBCUTANEOUS
Qty: 15 ML | Refills: 1 | Status: SHIPPED | OUTPATIENT
Start: 2025-03-26

## 2025-03-26 NOTE — TELEPHONE ENCOUNTER
Rx Refill Note  Requested Prescriptions     Pending Prescriptions Disp Refills    Insulin Lispro, 1 Unit Dial, (HUMALOG) 100 UNIT/ML solution pen-injector [Pharmacy Med Name: INSULIN LISPRO 100U/ML KWIKPEN 3ML] 15 mL 0     Sig: ADMINISTER 14 UNITS UNDER THE SKIN THREE TIMES DAILY AS DIRECTED      Last office visit with prescribing clinician: 2/26/2025   Last telemedicine visit with prescribing clinician: Visit date not found   Next office visit with prescribing clinician: 5/27/2025                         Would you like a call back once the refill request has been completed: [] Yes [] No    If the office needs to give you a call back, can they leave a voicemail: [] Yes [] No    Ellie Sharif MA  03/26/25, 09:30 EDT

## 2025-04-04 RX ORDER — GABAPENTIN 300 MG/1
300 CAPSULE ORAL 2 TIMES DAILY
Qty: 180 CAPSULE | Refills: 0 | Status: SHIPPED | OUTPATIENT
Start: 2025-04-04

## 2025-05-27 ENCOUNTER — OFFICE VISIT (OUTPATIENT)
Dept: ENDOCRINOLOGY | Age: 55
End: 2025-05-27
Payer: COMMERCIAL

## 2025-05-27 VITALS
BODY MASS INDEX: 25.64 KG/M2 | DIASTOLIC BLOOD PRESSURE: 68 MMHG | WEIGHT: 140.2 LBS | HEART RATE: 90 BPM | OXYGEN SATURATION: 96 % | SYSTOLIC BLOOD PRESSURE: 118 MMHG | TEMPERATURE: 98 F

## 2025-05-27 DIAGNOSIS — E11.65 TYPE 2 DIABETES MELLITUS WITH HYPERGLYCEMIA, WITH LONG-TERM CURRENT USE OF INSULIN: Primary | ICD-10-CM

## 2025-05-27 DIAGNOSIS — Z79.4 TYPE 2 DIABETES MELLITUS WITH HYPERGLYCEMIA, WITH LONG-TERM CURRENT USE OF INSULIN: Primary | ICD-10-CM

## 2025-05-27 LAB — HBA1C MFR BLD: 9.2 % (ref 4.8–5.6)

## 2025-05-27 PROCEDURE — 99214 OFFICE O/P EST MOD 30 MIN: CPT | Performed by: INTERNAL MEDICINE

## 2025-05-27 PROCEDURE — 95251 CONT GLUC MNTR ANALYSIS I&R: CPT | Performed by: INTERNAL MEDICINE

## 2025-05-27 NOTE — PROGRESS NOTES
Chief complaint   Chief Complaint   Patient presents with    Type 2 diabetes mellitus with hyperglycemia, with long-term          Subjective     History of Present Illness:      This is a 54 year old female I am seeing for type 2 DM   referred by PCP   Last OV was 12 weeks ago   She is here for a follow up   other medical history is   1- HTN   2- HLD   3- other   Pt had T2DM for many years   Current home medication for diabetes     Off Metfromin    Lantus 38 units PM    Lispro 14 units TID with meals + SSI   150-199 add 2 units   200-249 add 4 units   > 250 add 6 units   If she is not eating and has a high BG she can take SSI   the A1c was 8.1 in 8-2024  Checks blood sugar at home using CGM Tatiana 3 +  Checks blood sugar 4 times per day   SBMG: ave is 232 35 % in range and rest is high  mainly after dinner , 1 % lows   pt states that she is better BG with the new insulin dosages and sometimes she does  takes her insulin late after meals and that causes her to have a high readings at lunch she is is done chemo for breast cancer now  then she will have surgery next month and may be radiation and there is few Hypoglycemic episodes  No AM Headaches /Nightmares  No Polyuria / Polydipsia  No Blurring of Vision  Last Dilated Pupil Exam, in 2024 , no DM in the eye   NoTingling / numbness in feet  No Dizziness / lightheadedness  Dietary Compliance, no   Exercise , bike   Weight is going down   No Falls  No Nausea, vomiting / Diarrhea     Latest Reference Range & Units 08/19/24 11:43   Total Cholesterol 0 - 200 mg/dL 175   HDL Cholesterol 40 - 60 mg/dL 29 (L)   LDL Cholesterol  0 - 100 mg/dL 93   Triglycerides 0 - 150 mg/dL 316 (H)   VLDL Cholesterol Parviz 5 - 40 mg/dL 53 (H)   (L): Data is abnormally low  (H): Data is abnormally high   Latest Reference Range & Units 08/09/24 00:44   Magnesium 1.6 - 2.6 mg/dL  1.6 - 2.6 mg/dL 1.4 (L) (E)  1.4 (L) (E)   Phosphorus 2.3 - 4.7 mg/dL  2.3 - 4.7 mg/dL 2.0 (L) (E)  1.9 (L) (E)    Hemoglobin A1C 4.3 - 5.6 % 8.1 (H) (E)   WBC 4.5 - 11.0 10*3/uL 3.22 (L) (E)   RBC 4.0 - 5.2 10*6/uL 2.60 (L) (E)   Hemoglobin 12.0 - 16.0 g/dL 8.5 (L) (E)   Hematocrit 36.0 - 46.0 % 23.7 (L) (E)   Platelets 140 - 440 10*3/uL 199 (E)   RDW 12.0 - 16.8 % 16.6 (E)   MCV 80.0 - 100.0 fL 91.2 (E)   MCH 26.0 - 34.0 pg 32.7 (E)   MCHC 31.0 - 37.0 g/dL 35.9 (E)   MPV 8.4 - 12.4 fL 9.3 (E)   Neutrophil Rel % 45 - 80 % 23.6 (L) (E)   Lymphocyte Rel % 15 - 50 % 63.7 (H) (E)   Monocyte Rel % 0 - 15 % 11.5 (E)   Eosinophil Rel % 0 - 7 % 0.6 (E)   Basophil Rel % 0 - 2 % 0.3 (E)   Immature Granulocyte Rel % 0.0 - 1.0 % 0.3 (E)   Neutrophils Absolute 2.0 - 8.8 10*3/uL 0.76 (L) (E)   Lymphocytes Absolute 0.7 - 5.5 10*3/uL 2.05 (E)   Monocytes Absolute 0.0 - 1.7 10*3/uL 0.37 (E)   Eosinophils Absolute 0.0 - 0.8 10*3/uL 0.02 (E)   Basophils Absolute 0.0 - 0.2 10*3/uL 0.01 (E)   Immature Grans, Absolute 0.00 - 0.10 10*3/uL 0.01 (E)   nRBC 0 /100(WBC) 0 (E)   Differential Type (arb'U) Hospital CBC w/AutoDiff (E)   Mean Bld Glu Estim. mg/dL 186 (E)   (L): Data is abnormally low  (H): Data is abnormally high  (E): External lab result        Family History   Problem Relation Age of Onset    Heart disease Father     Diabetes Father     COPD Father     Hypertension Father     Colon cancer Mother     Cancer Mother     Heart disease Mother     Arthritis Mother     COPD Mother     Heart disease Brother     Lung cancer Brother     No Known Problems Sister     No Known Problems Son     No Known Problems Daughter     No Known Problems Paternal Grandmother     No Known Problems Maternal Grandmother     No Known Problems Maternal Aunt     No Known Problems Paternal Aunt     BRCA 1/2 Neg Hx     Breast cancer Neg Hx     Endometrial cancer Neg Hx     Ovarian cancer Neg Hx     Malig Hyperthermia Neg Hx      Social History     Socioeconomic History    Marital status: Single   Tobacco Use    Smoking status: Never     Passive exposure: Never     Smokeless tobacco: Never   Vaping Use    Vaping status: Never Used   Substance and Sexual Activity    Alcohol use: Yes     Comment: rarely on occas    Drug use: No    Sexual activity: Defer     Birth control/protection: Post-menopausal     Past Medical History:   Diagnosis Date    Allergic     Anxiety     Depression     Diabetes mellitus     Elevated cholesterol     Hormone replacement therapy     Hypothyroidism     PONV (postoperative nausea and vomiting)      Past Surgical History:   Procedure Laterality Date    BACK SURGERY       SECTION      CHOLECYSTECTOMY      COLONOSCOPY N/A 2020    Procedure: COLONOSCOPY;  Surgeon: Emory Acosta MD;  Location: [facility] ENDOSCOPY;  Service: Gastroenterology;  Laterality: N/A;  PRE- SCREENING, FAMILY HX COLON CA  POST- NORMAL    LUMBAR DISCECTOMY Right 2020    Procedure: L3 & L4 MICRO DISCECTOMY WITH METRIX;  Surgeon: Edwin Mcgraw MD;  Location: [facility] MAIN OR;  Service: Neurosurgery;  Laterality: Right;    TONSILLECTOMY      TUBAL ABDOMINAL LIGATION      URETEROSCOPY LASER LITHOTRIPSY WITH STENT INSERTION Bilateral 10/4/2020    Procedure: URETEROSCOPY LASER LITHOTRIPSY WITH bilateral retrogrades, and bilateral STENT INSERTION;  Surgeon: Cm Bennett MD;  Location: [facility] MAIN OR;  Service: Urology;  Laterality: Bilateral;    US GUIDED CYST ASPIRATION BREAST N/A 3/23/2022    US GUIDED LYMPH NODE BIOPSY  2024       Current Outpatient Medications:     azelastine (ASTEPRO) 0.15 % solution nasal spray, 1 spray into the nostril(s) as directed by provider Daily. (Patient taking differently: Administer 1 spray into the nostril(s) as directed by provider Daily As Needed.), Disp: 3 each, Rfl: 1    Continuous Glucose Sensor (FreeStyle Tatiana 3 Sensor) misc, Apply 1 each topically Every 14 (Fourteen) Days. Indications: Type 2 Diabetes, Disp: 2 each, Rfl: 2    cyclobenzaprine (FLEXERIL) 10 MG tablet, Take 1 tablet by mouth., Disp: , Rfl:      fluticasone (FLONASE) 50 MCG/ACT nasal spray, 2 sprays into the nostril(s) as directed by provider Daily., Disp: 3 bottle, Rfl: 1    gabapentin (NEURONTIN) 300 MG capsule, TAKE ONE CAPSULE BY MOUTH TWICE DAILY, Disp: 180 capsule, Rfl: 0    glucose blood (OneTouch Verio) test strip, Use as instructed to check glucose, Disp: 50 each, Rfl: 6    Insulin Glargine (LANTUS SOLOSTAR) 100 UNIT/ML injection pen, Inject 38 Units under the skin into the appropriate area as directed Every Night., Disp: 15 mL, Rfl: 3    Insulin Lispro, 1 Unit Dial, (HUMALOG) 100 UNIT/ML solution pen-injector, ADMINISTER 14 UNITS UNDER THE SKIN THREE TIMES DAILY AS DIRECTED, Disp: 15 mL, Rfl: 1    Insulin Pen Needle (B-D UF III MINI PEN NEEDLES) 31G X 5 MM misc, Use 1 each 4 (Four) Times a Day., Disp: 400 each, Rfl: 3    Lancets 30G misc, Use to check blood sugar once daily for diabetes E11.65, Disp: 100 each, Rfl: 2    levothyroxine (SYNTHROID, LEVOTHROID) 112 MCG tablet, TAKE ONE TABLET BY MOUTH DAILY EXCEPT ON SUNDAY AND THURSDAY TAKE ONE-HALF TABLET AS DIRECTED, Disp: 90 tablet, Rfl: 0    lisinopril (PRINIVIL,ZESTRIL) 2.5 MG tablet, Take 1 tablet by mouth Daily., Disp: 90 tablet, Rfl: 1    multivitamin with minerals tablet tablet, Take 1 tablet by mouth Daily., Disp: , Rfl:     ondansetron (ZOFRAN) 8 MG tablet, Take 1 tablet by mouth Every 8 (Eight) Hours As Needed., Disp: , Rfl:     promethazine (PHENERGAN) 25 MG tablet, Take 0.5-1 tablets by mouth Every 6 (Six) Hours As Needed., Disp: , Rfl:     rosuvastatin (CRESTOR) 10 MG tablet, TAKE ONE TABLET BY MOUTH EVERY EVENING, Disp: 90 tablet, Rfl: 1    sertraline (ZOLOFT) 100 MG tablet, Take 1 tablet by mouth Daily., Disp: 90 tablet, Rfl: 1    Turmeric (QC TUMERIC COMPLEX PO), Take  by mouth. (Patient not taking: Reported on 5/27/2025), Disp: , Rfl:   Adhesive tape and Chlorhexidine    Objective   Vitals:    05/27/25 0756   BP: 118/68   Pulse: 90   Temp: 98 °F (36.7 °C)   SpO2: 96%            BP  118/68 (BP Location: Right arm)   Pulse 90   Temp 98 °F (36.7 °C)   Wt 63.6 kg (140 lb 3.2 oz)   LMP 11/16/2013 (LMP Unknown) Comment: patient states her last period was six years ago and is post menopausal   SpO2 96%   BMI 25.64 kg/m²       Physical Exam  Neurological:      General: No focal deficit present.      Mental Status: She is alert and oriented to person, place, and time.               Diagnoses and all orders for this visit:    1. Type 2 diabetes mellitus with hyperglycemia, with long-term current use of insulin (Primary)  -     Hemoglobin A1c                 Assessment:     1- DM, Type 2  Uncontrolled with High risk , getting better   CGM on file , reviewed   We discussed the medications  Hypoglycemia and its importance was reviewed   Labs where shown to the patient   2. Hypertension, on ACE inh   3. Hyperlipidemia, on a statin , LDL is in range   4. Long term insulin use         Plan:    1. Diet, exercise and weight management  2. Consistent food intake to avoid low blood sugars  3. Home medication includes for diabetes       No  Metfromin    Lantus 38 units PM   Lispro 14 units TID with meals + SSI , she will take 8 units if having a small meal   150-199 add 2 units   200-249 add 4 units   > 250 add 6 units   She will try to take her insulin before meals even if she is low     4. Consistent checking of blood sugars using Tatiana 3 +  5. I reviewed the last progress note  6. Annual eye exam  7. Return in 3 months   8. Send in readings in 2-4 weeks if running high   9. Rx sent to the pharmacy  10. Labs : A1c  today       I discussed with the patient/legal representative the risks and the benefits associated with the medications.  The patient/legal representative has been given the opportunity to ask questions.  Alternatives to the proposed treatment (s) were discussed, including the likely results of no treatment.  The patient/legal representative wishes to proceed.       Tremaine Edgar MD    05/27/25  08:21 EDT

## 2025-06-23 DIAGNOSIS — Z79.4 TYPE 2 DIABETES MELLITUS WITH HYPERGLYCEMIA, WITH LONG-TERM CURRENT USE OF INSULIN: ICD-10-CM

## 2025-06-23 DIAGNOSIS — E11.65 TYPE 2 DIABETES MELLITUS WITH HYPERGLYCEMIA, WITH LONG-TERM CURRENT USE OF INSULIN: ICD-10-CM

## 2025-06-23 RX ORDER — INSULIN LISPRO 100 [IU]/ML
INJECTION, SOLUTION INTRAVENOUS; SUBCUTANEOUS
Qty: 15 ML | Refills: 0 | Status: SHIPPED | OUTPATIENT
Start: 2025-06-23

## 2025-06-23 NOTE — TELEPHONE ENCOUNTER
Rx Refill Note  Requested Prescriptions     Pending Prescriptions Disp Refills    Insulin Lispro, 1 Unit Dial, (HUMALOG) 100 UNIT/ML solution pen-injector [Pharmacy Med Name: INSULIN LISPRO 100U/ML KWIKPEN 3ML] 15 mL 1     Sig: ADMINISTER 14 UNITS UNDER THE SKIN THREE TIMES DAILY AS DIRECTED      Last office visit with prescribing clinician: 5/27/2025   Last telemedicine visit with prescribing clinician: Visit date not found   Next office visit with prescribing clinician: 8/28/2025                         Would you like a call back once the refill request has been completed: [] Yes [] No    If the office needs to give you a call back, can they leave a voicemail: [] Yes [] No    Michelle Gonzalez  06/23/25, 14:02 EDT  Michelle Gonzalez  6/23/2025  14:02 EDT

## 2025-06-26 RX ORDER — LEVOTHYROXINE SODIUM 112 UG/1
TABLET ORAL
Qty: 90 TABLET | Refills: 0 | Status: SHIPPED | OUTPATIENT
Start: 2025-06-26

## 2025-07-10 RX ORDER — ROSUVASTATIN CALCIUM 10 MG/1
10 TABLET, COATED ORAL EVERY EVENING
Qty: 90 TABLET | Refills: 1 | Status: SHIPPED | OUTPATIENT
Start: 2025-07-10

## 2025-07-10 RX ORDER — SERTRALINE HYDROCHLORIDE 100 MG/1
100 TABLET, FILM COATED ORAL DAILY
Qty: 90 TABLET | Refills: 1 | Status: SHIPPED | OUTPATIENT
Start: 2025-07-10

## 2025-07-10 RX ORDER — LISINOPRIL 2.5 MG/1
2.5 TABLET ORAL DAILY
Qty: 90 TABLET | Refills: 1 | Status: SHIPPED | OUTPATIENT
Start: 2025-07-10

## 2025-07-23 DIAGNOSIS — Z79.4 TYPE 2 DIABETES MELLITUS WITH HYPERGLYCEMIA, WITH LONG-TERM CURRENT USE OF INSULIN: ICD-10-CM

## 2025-07-23 DIAGNOSIS — E11.65 TYPE 2 DIABETES MELLITUS WITH HYPERGLYCEMIA, WITH LONG-TERM CURRENT USE OF INSULIN: ICD-10-CM

## 2025-07-23 RX ORDER — INSULIN LISPRO 100 [IU]/ML
INJECTION, SOLUTION INTRAVENOUS; SUBCUTANEOUS
Qty: 15 ML | Refills: 0 | Status: SHIPPED | OUTPATIENT
Start: 2025-07-23

## 2025-07-23 NOTE — TELEPHONE ENCOUNTER
Rx Refill Note  Requested Prescriptions     Pending Prescriptions Disp Refills    Insulin Lispro, 1 Unit Dial, (HUMALOG) 100 UNIT/ML solution pen-injector [Pharmacy Med Name: INSULIN LISPRO 100U/ML KWIKPEN 3ML] 15 mL 0     Sig: ADMINISTER 14 UNITS UNDER THE SKIN THREE TIMES DAILY AS DIRECTED      Last office visit with prescribing clinician: Visit date not found   Last telemedicine visit with prescribing clinician: Visit date not found   Next office visit with prescribing clinician: Visit date not found                         Would you like a call back once the refill request has been completed: [] Yes [] No    If the office needs to give you a call back, can they leave a voicemail: [] Yes [] No  Elvira Gonzalez MA  7/23/2025  09:24 EDT

## 2025-07-29 DIAGNOSIS — E78.00 HYPERCHOLESTEROLEMIA: ICD-10-CM

## 2025-07-29 DIAGNOSIS — E03.9 ACQUIRED HYPOTHYROIDISM: Primary | ICD-10-CM

## 2025-08-06 DIAGNOSIS — Z79.4 TYPE 2 DIABETES MELLITUS WITH HYPERGLYCEMIA, WITH LONG-TERM CURRENT USE OF INSULIN: ICD-10-CM

## 2025-08-06 DIAGNOSIS — E11.65 TYPE 2 DIABETES MELLITUS WITH HYPERGLYCEMIA, WITH LONG-TERM CURRENT USE OF INSULIN: ICD-10-CM

## 2025-08-06 RX ORDER — HYDROCHLOROTHIAZIDE 12.5 MG/1
CAPSULE ORAL
Qty: 2 EACH | Refills: 0 | Status: SHIPPED | OUTPATIENT
Start: 2025-08-06

## 2025-08-28 ENCOUNTER — OFFICE VISIT (OUTPATIENT)
Dept: ENDOCRINOLOGY | Age: 55
End: 2025-08-28
Payer: COMMERCIAL

## 2025-08-28 VITALS
SYSTOLIC BLOOD PRESSURE: 124 MMHG | WEIGHT: 139 LBS | DIASTOLIC BLOOD PRESSURE: 76 MMHG | OXYGEN SATURATION: 98 % | HEART RATE: 95 BPM | HEIGHT: 62 IN | BODY MASS INDEX: 25.58 KG/M2

## 2025-08-28 DIAGNOSIS — Z79.4 TYPE 2 DIABETES MELLITUS WITH HYPERGLYCEMIA, WITH LONG-TERM CURRENT USE OF INSULIN: Primary | ICD-10-CM

## 2025-08-28 DIAGNOSIS — E11.65 TYPE 2 DIABETES MELLITUS WITH HYPERGLYCEMIA, WITH LONG-TERM CURRENT USE OF INSULIN: Primary | ICD-10-CM

## 2025-08-28 RX ORDER — DAPAGLIFLOZIN 10 MG/1
10 TABLET, FILM COATED ORAL DAILY
Qty: 30 TABLET | Refills: 2 | Status: SHIPPED | OUTPATIENT
Start: 2025-08-28

## 2025-08-28 RX ORDER — HYDROCHLOROTHIAZIDE 12.5 MG/1
CAPSULE ORAL
Qty: 2 EACH | Refills: 0 | Status: SHIPPED | OUTPATIENT
Start: 2025-08-28

## 2025-08-29 LAB
ALBUMIN/CREAT UR: 8 MG/G CREAT (ref 0–29)
CREAT UR-MCNC: 212.1 MG/DL
HBA1C MFR BLD: 10.6 % (ref 4.8–5.6)
MICROALBUMIN UR-MCNC: 16.7 UG/ML

## (undated) DEVICE — THE TORRENT IRRIGATION SCOPE CONNECTOR IS USED WITH THE TORRENT IRRIGATION TUBING TO PROVIDE IRRIGATION FLUIDS SUCH AS STERILE WATER DURING GASTROINTESTINAL ENDOSCOPIC PROCEDURES WHEN USED IN CONJUNCTION WITH AN IRRIGATION PUMP (OR ELECTROSURGICAL UNIT).: Brand: TORRENT

## (undated) DEVICE — DRP C/ARM 41X74IN

## (undated) DEVICE — NITINOL STONE RETRIEVAL BASKET: Brand: ZERO TIP

## (undated) DEVICE — DISPOSABLE BIPOLAR FORCEPS 7 3/4" (19.7CM) SCOVILLE BAYONET, 1.5MM TIP AND 12 FT. (3.6M) CABLE: Brand: KIRWAN

## (undated) DEVICE — LN SMPL CO2 SHTRM SD STREAM W/M LUER

## (undated) DEVICE — GOWN,NON-REINFORCED,SIRUS,SET IN SLV,XXL: Brand: MEDLINE

## (undated) DEVICE — SMOKE EVACUATION TUBING WITH 7/8 IN TO 1/4 IN REDUCER: Brand: BUFFALO FILTER

## (undated) DEVICE — DRP MICROSCOPE 4 BINOCULAR CV 54X150IN

## (undated) DEVICE — GLV SURG BIOGEL LTX PF 7

## (undated) DEVICE — SENSR O2 OXIMAX FNGR A/ 18IN NONSTR

## (undated) DEVICE — KT ORCA ORCAPOD DISP STRL

## (undated) DEVICE — FIBR LASR HOLMIUM ACCUMAX 365 1PT USE

## (undated) DEVICE — CONN TBG Y 5 IN 1 LF STRL

## (undated) DEVICE — ANTIBACTERIAL UNDYED BRAIDED (POLYGLACTIN 910), SYNTHETIC ABSORBABLE SUTURE: Brand: COATED VICRYL

## (undated) DEVICE — DRSNG WND BORDR/ADHS NONADHR/GZ LF 4X4IN STRL

## (undated) DEVICE — URETERAL DILATATION SYSTEM

## (undated) DEVICE — TIDISHIELD UROLOGY DRAIN BAGS FROSTY VINYL STERILE FITS SIEMENS UROSKOP ACCESS 20 PER CASE: Brand: TIDISHIELD

## (undated) DEVICE — GLV SURG PREMIERPRO ORTHO LTX PF SZ8 BRN

## (undated) DEVICE — ADAPT CLN BIOGUARD AIR/H2O DISP

## (undated) DEVICE — TUBING, SUCTION, 1/4" X 10', STRAIGHT: Brand: MEDLINE

## (undated) DEVICE — APPL CHLORAPREP W/TINT 26ML ORNG

## (undated) DEVICE — GLV SURG PREMIERPRO ORTHO LTX PF SZ6.5 BRN

## (undated) DEVICE — ELECTRD BLD EXT EDGE 1P COAT 6.5IN STRL

## (undated) DEVICE — 3.0MM NEURO (MATCH HEAD) LESS AGGRESSIVE

## (undated) DEVICE — PK NEURO SPINE 40

## (undated) DEVICE — 3M™ STERI-STRIP™ REINFORCED ADHESIVE SKIN CLOSURES, R1547, 1/2 IN X 4 IN (12 MM X 100 MM), 6 STRIPS/ENVELOPE: Brand: 3M™ STERI-STRIP™

## (undated) DEVICE — SYR CONTRL LUERLOK 10CC

## (undated) DEVICE — NEEDLE, QUINCKE 22GX3.5": Brand: MEDLINE INDUSTRIES, INC.

## (undated) DEVICE — PRT BIOP SEALS

## (undated) DEVICE — CATH URETRL FLXITP POLLACK STD 5F 70CM

## (undated) DEVICE — PK URETSCP 40

## (undated) DEVICE — CANN O2 ETCO2 FITS ALL CONN CO2 SMPL A/ 7IN DISP LF